# Patient Record
Sex: MALE | Race: WHITE | Employment: OTHER | URBAN - NONMETROPOLITAN AREA
[De-identification: names, ages, dates, MRNs, and addresses within clinical notes are randomized per-mention and may not be internally consistent; named-entity substitution may affect disease eponyms.]

---

## 2019-06-27 ENCOUNTER — OFFICE VISIT (OUTPATIENT)
Dept: FAMILY MEDICINE CLINIC | Age: 69
End: 2019-06-27
Payer: MEDICARE

## 2019-06-27 VITALS
HEIGHT: 74 IN | HEART RATE: 77 BPM | BODY MASS INDEX: 24.95 KG/M2 | DIASTOLIC BLOOD PRESSURE: 70 MMHG | WEIGHT: 194.4 LBS | TEMPERATURE: 98.9 F | OXYGEN SATURATION: 98 % | RESPIRATION RATE: 16 BRPM | SYSTOLIC BLOOD PRESSURE: 122 MMHG

## 2019-06-27 DIAGNOSIS — Z76.89 ENCOUNTER TO ESTABLISH CARE: Primary | ICD-10-CM

## 2019-06-27 DIAGNOSIS — Z12.11 SCREENING FOR COLON CANCER: ICD-10-CM

## 2019-06-27 DIAGNOSIS — Z11.59 NEED FOR HEPATITIS C SCREENING TEST: ICD-10-CM

## 2019-06-27 DIAGNOSIS — Z00.00 MEDICARE ANNUAL WELLNESS VISIT, INITIAL: ICD-10-CM

## 2019-06-27 DIAGNOSIS — F17.210 CIGARETTE NICOTINE DEPENDENCE WITHOUT COMPLICATION: ICD-10-CM

## 2019-06-27 DIAGNOSIS — R30.0 DYSURIA: ICD-10-CM

## 2019-06-27 DIAGNOSIS — R35.1 NOCTURIA: ICD-10-CM

## 2019-06-27 DIAGNOSIS — Z11.4 SCREENING FOR HIV WITHOUT PRESENCE OF RISK FACTORS: ICD-10-CM

## 2019-06-27 DIAGNOSIS — Z13.1 ENCOUNTER FOR SCREENING FOR DIABETES MELLITUS: ICD-10-CM

## 2019-06-27 DIAGNOSIS — Z13.220 SCREENING FOR HYPERLIPIDEMIA: ICD-10-CM

## 2019-06-27 DIAGNOSIS — R82.81 PYURIA: ICD-10-CM

## 2019-06-27 DIAGNOSIS — Z12.5 PROSTATE CANCER SCREENING: ICD-10-CM

## 2019-06-27 LAB
APPEARANCE FLUID: CLEAR
BILIRUBIN, POC: ABNORMAL
BLOOD URINE, POC: ABNORMAL
CLARITY, POC: CLEAR
COLOR, POC: YELLOW
GLUCOSE URINE, POC: ABNORMAL
KETONES, POC: ABNORMAL
LEUKOCYTE EST, POC: ABNORMAL
NITRITE, POC: POSITIVE
PH, POC: 5.5
PROTEIN, POC: 100
SPECIFIC GRAVITY, POC: 1.03
UROBILINOGEN, POC: 0.2

## 2019-06-27 PROCEDURE — 99203 OFFICE O/P NEW LOW 30 MIN: CPT | Performed by: INTERNAL MEDICINE

## 2019-06-27 PROCEDURE — 81002 URINALYSIS NONAUTO W/O SCOPE: CPT | Performed by: INTERNAL MEDICINE

## 2019-06-27 RX ORDER — CIPROFLOXACIN 250 MG/1
250 TABLET, FILM COATED ORAL 2 TIMES DAILY
Qty: 20 TABLET | Refills: 0 | Status: SHIPPED | OUTPATIENT
Start: 2019-06-27 | End: 2019-07-07

## 2019-06-27 SDOH — HEALTH STABILITY: MENTAL HEALTH: HOW OFTEN DO YOU HAVE A DRINK CONTAINING ALCOHOL?: 4 OR MORE TIMES A WEEK

## 2019-06-27 SDOH — HEALTH STABILITY: MENTAL HEALTH: HOW MANY STANDARD DRINKS CONTAINING ALCOHOL DO YOU HAVE ON A TYPICAL DAY?: 1 OR 2

## 2019-06-27 ASSESSMENT — ENCOUNTER SYMPTOMS
WHEEZING: 0
VOICE CHANGE: 0
COLOR CHANGE: 0
EYE DISCHARGE: 0
VOMITING: 0
SINUS PRESSURE: 0
DIARRHEA: 0
RHINORRHEA: 0
SORE THROAT: 0
ABDOMINAL PAIN: 0
COUGH: 0
EYE PAIN: 0
EYE REDNESS: 0
BLOOD IN STOOL: 0
CHEST TIGHTNESS: 0
SHORTNESS OF BREATH: 0

## 2019-06-27 ASSESSMENT — PATIENT HEALTH QUESTIONNAIRE - PHQ9
SUM OF ALL RESPONSES TO PHQ QUESTIONS 1-9: 0
SUM OF ALL RESPONSES TO PHQ9 QUESTIONS 1 & 2: 0
2. FEELING DOWN, DEPRESSED OR HOPELESS: 0
SUM OF ALL RESPONSES TO PHQ QUESTIONS 1-9: 0
1. LITTLE INTEREST OR PLEASURE IN DOING THINGS: 0

## 2019-06-27 NOTE — PROGRESS NOTES
Neurological: Negative for dizziness, tremors, syncope, speech difficulty, weakness, numbness and headaches. Hematological: Negative for adenopathy. Does not bruise/bleed easily. Psychiatric/Behavioral: Negative for confusion, dysphoric mood and sleep disturbance. The patient is not nervous/anxious. All other systems reviewed and are negative. History reviewed. No pertinent past medical history. No current outpatient medications on file. No current facility-administered medications for this visit. No Known Allergies    Past Surgical History:   Procedure Laterality Date    LEG SURGERY Right     femur fracture    SHOULDER SURGERY Right 1965    shoulder fracture, ORIF, football injury       Social History     Tobacco Use    Smoking status: Current Every Day Smoker     Packs/day: 1.00     Years: 47.00     Pack years: 47.00     Types: Cigarettes    Smokeless tobacco: Never Used   Substance Use Topics    Alcohol use: Yes     Alcohol/week: 14.0 standard drinks     Types: 14 Cans of beer per week     Frequency: 4 or more times a week     Drinks per session: 1 or 2     Binge frequency: Daily or almost daily    Drug use: Never       Family History   Problem Relation Age of Onset    Diabetes Mother     Colon Cancer Father 80    No Known Problems Sister     Other Brother         disability with back    No Known Problems Maternal Grandmother     Heart Disease Maternal Grandfather     No Known Problems Paternal Grandmother     No Known Problems Paternal Grandfather     Pancreatic Cancer Daughter     Diabetes type 2  Daughter     Thyroid Disease Daughter     High Cholesterol Daughter        /70   Pulse 77   Temp 98.9 °F (37.2 °C)   Resp 16   Ht 6' 2\" (1.88 m)   Wt 194 lb 6.4 oz (88.2 kg)   SpO2 98%   BMI 24.96 kg/m²     Physical Exam   Constitutional: He is oriented to person, place, and time. He appears well-developed and well-nourished. Non-toxic appearance.  No are 2+ on the right side and 2+ on the left side. Patellar reflexes are 2+ on the right side and 2+ on the left side. Skin: Skin is warm and dry. Capillary refill takes less than 2 seconds. No rash noted. No cyanosis. Nails show no clubbing. Psychiatric: He has a normal mood and affect. His speech is normal and behavior is normal. Judgment and thought content normal. Cognition and memory are normal.   Nursing note and vitals reviewed. POCT UA: specific gravity >1.030, large blood ,trace ketones, 100 protein, nitrite positive, and trace leukocyte esterase    Assessment:    ICD-10-CM    1. Encounter to establish care Z76.89    2. Nocturia R35.1    3. Dysuria R30.0 POCT Urinalysis no Micro     Urinalysis Reflex to Culture   4. Pyuria N39.0 ciprofloxacin (CIPRO) 250 MG tablet   5. Screening for colon cancer Z12.11    6. Need for hepatitis C screening test Z11.59    7. Screening for HIV without presence of risk factors Z11.4    8. Screening for hyperlipidemia Z13.220 Lipid, Fasting   9. Encounter for screening for diabetes mellitus Z13.1    10. Prostate cancer screening Z12.5 Psa screening   11. Cigarette nicotine dependence without complication J97.549    12. Medicare annual wellness visit, initial Z00.00 CBC Auto Differential     Comprehensive Metabolic Panel     CANCELED: Lipid Panel       Plan:  George Adams was seen today for new patient and benign prostatic hypertrophy. Diagnoses and all orders for this visit:    Encounter to establish care    Nocturia    Dysuria  -     POCT Urinalysis no Micro  -     Urinalysis Reflex to Culture; Future    Pyuria  -     ciprofloxacin (CIPRO) 250 MG tablet; Take 1 tablet by mouth 2 times daily for 10 days Take with food    Screening for colon cancer    Need for hepatitis C screening test    Screening for HIV without presence of risk factors    Screening for hyperlipidemia  -     Lipid, Fasting;  Future    Encounter for screening for diabetes mellitus    Prostate cancer a mammogram, which is an X-ray of your breasts. A mammogram can spot breast cancer before it can be felt and when it is easiest to treat. · Thyroid disease. Talk to your doctor about whether to have your thyroid checked as part of a regular physical exam. Women have an increased chance of a thyroid problem. For men  · Prostate exam. Talk to your doctor about whether you should have a blood test (called a PSA test) for prostate cancer. Experts recommend that you discuss the benefits and risks of the test with your doctor before you decide whether to have this test. Some experts say that men ages 79 and older no longer need testing. · Abdominal aortic aneurysm. Ask your doctor whether you should have a test to check for an aneurysm. You may need a test if you ever smoked or if your parent, brother, sister, or child has had an aneurysm. When should you call for help? Watch closely for changes in your health, and be sure to contact your doctor if you have any problems or symptoms that concern you. Where can you learn more? Go to https://Xola.Vibes. org and sign in to your S[employer]ing account. Enter V812 in the [facility] box to learn more about \"Well Visit, Over 65: Care Instructions. \"     If you do not have an account, please click on the \"Sign Up Now\" link. Current as of: December 13, 2018  Content Version: 12.0  © 3490-4398 Healthwise, Incorporated. Care instructions adapted under license by [facility] ([facility]). If you have questions about a medical condition or this instruction, always ask your healthcare professional. [place] [address] any warranty or liability for your use of this information. Patient Education        Colon Cancer Screening: Care Instructions  Your Care Instructions    Colorectal cancer occurs in the colon or rectum. That's the lower part of your digestive system. It is the second-leading cause of cancer deaths in the [place].  It often starts with small growths called polyps in the colon or rectum. Polyps are usually found with screening tests. Depending on the type of test, any polyps found may be removed during the tests. Colorectal cancer usually does not cause symptoms at first. But regular tests can help find it early, before it spreads and becomes harder to treat. Your risk for colorectal cancer gets higher as you get older. Some experts say that adults should start regular screening at age 48 and stop at age 76. Others say to start before age 48 or continue after age 76. Talk with your doctor about your risk and when to start and stop screening. You may have one of several tests. Follow-up care is a key part of your treatment and safety. Be sure to make and go to all appointments, and call your doctor if you are having problems. It's also a good idea to know your test results and keep a list of the medicines you take. What are the main screening tests for colon cancer? · Stool tests. These include the fecal immunochemical test (FIT) and the fecal occult blood test (FOBT). These tests check stool samples for signs of cancer. If your test is positive, you will need to have a colonoscopy. · Sigmoidoscopy. This test lets your doctor look at the lining of your rectum and the lowest part of your colon. Your doctor uses a lighted tube called a sigmoidoscope. This test can't find cancers or polyps in the upper part of your colon. In some cases, polyps that are found can be removed. But if your doctor finds polyps, you will need to have a colonoscopy to check the upper part of your colon. · Colonoscopy. This test lets your doctor look at the lining of your rectum and your entire colon. The doctor uses a thin, flexible tool called a colonoscope. It can also be used to remove polyps or get a tissue sample (biopsy). What tests do you need? The following guidelines are for adults who are not at high risk for colorectal cancer.  You may have at least one of these tests as directed by your doctor. · Fecal immunochemical test (FIT) or guaiac fecal occult blood test (gFOBT) every year  · Sigmoidoscopy every 5 years  · Colonoscopy every 10 years  If you are over age 76, you can work with your doctor to decide if screening is a good option. Talk with your doctor about when you need to be tested. And discuss which tests are right for you. Your doctor may recommend earlier or more frequent testing if you:  · Have had colorectal cancer before. · Have had colon polyps. · Have symptoms of colorectal cancer. These include blood in your stool and changes in your bowel habits. · Have a parent, brother or sister, or child with colon polyps or colorectal cancer. · Have a bowel disease. This includes ulcerative colitis and Crohn's disease. · Have a rare polyp syndrome that runs in families, such as familial adenomatous polyposis (FAP). · Have had radiation treatments to the belly or pelvis. When should you call for help? Watch closely for changes in your health, and be sure to contact your doctor if:    · You have any changes in your bowel habits.     · You have any problems. Where can you learn more? Go to https://Brentwood Investments.Vicus Therapeutics. org and sign in to your Emergent Views account. Enter [phone] in the [facility] box to learn more about \"Colon Cancer Screening: Care Instructions. \"     If you do not have an account, please click on the \"Sign Up Now\" link. Current as of: December 19, 2018  Content Version: 12.0  © 6721-1768 Genesant. Care instructions adapted under license by [facility] ([facility]). If you have questions about a medical condition or this instruction, always ask your healthcare professional. [place] [address] any warranty or liability for your use of this information.          Patient Education        Benign Prostatic Hyperplasia: Care Instructions  Your Care Instructions    Benign prostatic hyperplasia, or you urinate. This turns your urine orange. You may stop taking it when your symptoms get better. But be sure to take all of your antibiotics, which treat the infection. · Drink extra water for the next day or two. This will help make the urine less concentrated and help wash out the bacteria causing the infection. (If you have kidney, heart, or liver disease and have to limit your fluids, talk with your doctor before you increase your fluid intake.)  · Avoid drinks that are carbonated or have caffeine. They can irritate the bladder. · Urinate often. Try to empty your bladder each time. · To relieve pain, take a hot bath or lay a heating pad (set on low) over your lower belly or genital area. Never go to sleep with a heating pad in place. To help prevent UTIs  · Drink plenty of fluids, enough so that your urine is light yellow or clear like water. If you have kidney, heart, or liver disease and have to limit fluids, talk with your doctor before you increase the amount of fluids you drink. · Urinate when you have the urge. Do not hold your urine for a long time. Urinate before you go to sleep. · Keep your penis clean. Catheter care  If you have a drainage tube (catheter) in place, the following steps will help you care for it. · Always wash your hands before and after touching your catheter. · Check the area around the urethra for inflammation or signs of infection. Signs of infection include irritated, swollen, red, or tender skin, or pus around the catheter. · Clean the area around the catheter with soap and water two times a day. Dry with a clean towel afterward. · Do not apply powder or lotion to the skin around the catheter. To empty the urine collection bag  · Wash your hands with soap and water. · Without touching the drain spout, remove the spout from its sleeve at the bottom of the collection bag. Open the valve on the spout.   · Let the urine flow out of the bag and into the toilet or a container. Do not let the tubing or drain spout touch anything. · After you empty the bag, clean the end of the drain spout with tissue and water. Close the valve and put the drain spout back into its sleeve at the bottom of the collection bag. · Wash your hands with soap and water. When should you call for help? Call your doctor now or seek immediate medical care if:    · Symptoms such as a fever, chills, nausea, or vomiting get worse or happen for the first time.     · You have new pain in your back just below your rib cage. This is called flank pain.     · There is new blood or pus in your urine.     · You are not able to take or keep down your antibiotics.    Watch closely for changes in your health, and be sure to contact your doctor if:    · You are not getting better after taking an antibiotic for 2 days.     · Your symptoms go away but then come back. Where can you learn more? Go to https://CapRally.Cloud Nine Productions. org and sign in to your [employer] account. Enter H125 in the [employer] box to learn more about \"Urinary Tract Infections in Men: Care Instructions. \"     If you do not have an account, please click on the \"Sign Up Now\" link. Current as of: December 19, 2018  Content Version: 12.0  © 5168-1935 Healthwise, Incorporated. Care instructions adapted under license by [facility] ([facility]). If you have questions about a medical condition or this instruction, always ask your healthcare professional. [place] [address] any warranty or liability for your use of this information. Patient voices understanding and agrees to plans along with risks and benefits of plan. Counseling:  Gracia Day's case, medications and options werediscussed in detail. Patient was instructed to call the office if he   questions regarding him treatment. Should him conditions worsen, he should return to office to be reassessed byDr. Gracia Ocampo.  he  Should to go the

## 2019-06-27 NOTE — PATIENT INSTRUCTIONS
Patient Education        Well Visit, Over 72: Care Instructions  Your Care Instructions    Physical exams can help you stay healthy. Your doctor has checked your overall health and may have suggested ways to take good care of yourself. He or she also may have recommended tests. At home, you can help prevent illness with healthy eating, regular exercise, and other steps. Follow-up care is a key part of your treatment and safety. Be sure to make and go to all appointments, and call your doctor if you are having problems. It's also a good idea to know your test results and keep a list of the medicines you take. How can you care for yourself at home? · Reach and stay at a healthy weight. This will lower your risk for many problems, such as obesity, diabetes, heart disease, and high blood pressure. · Get at least 30 minutes of exercise on most days of the week. Walking is a good choice. You also may want to do other activities, such as running, swimming, cycling, or playing tennis or team sports. · Do not smoke. Smoking can make health problems worse. If you need help quitting, talk to your doctor about stop-smoking programs and medicines. These can increase your chances of quitting for good. · Protect your skin from too much sun. When you're outdoors from 10 a.m. to 4 p.m., stay in the shade or cover up with clothing and a hat with a wide brim. Wear sunglasses that block UV rays. Even when it's cloudy, put broad-spectrum sunscreen (SPF 30 or higher) on any exposed skin. · See a dentist one or two times a year for checkups and to have your teeth cleaned. · Wear a seat belt in the car. · Limit alcohol to 2 drinks a day for men and 1 drink a day for women. Too much alcohol can cause health problems. Follow your doctor's advice about when to have certain tests. These tests can spot problems early. For men and women  · Cholesterol.  Your doctor will tell you how often to have this done based on your overall benefits and risks of the test with your doctor before you decide whether to have this test. Some experts say that men ages 79 and older no longer need testing. · Abdominal aortic aneurysm. Ask your doctor whether you should have a test to check for an aneurysm. You may need a test if you ever smoked or if your parent, brother, sister, or child has had an aneurysm. When should you call for help? Watch closely for changes in your health, and be sure to contact your doctor if you have any problems or symptoms that concern you. Where can you learn more? Go to https://chpepiceweb.Be Sport. org and sign in to your [employer] account. Enter G731 in the [employer] box to learn more about \"Well Visit, Over 65: Care Instructions. \"     If you do not have an account, please click on the \"Sign Up Now\" link. Current as of: December 13, 2018  Content Version: 12.0  © 9057-7173 Covagen. Care instructions adapted under license by [facility] ([facility]). If you have questions about a medical condition or this instruction, always ask your healthcare professional. [place] [address] any warranty or liability for your use of this information. Patient Education        Colon Cancer Screening: Care Instructions  Your Care Instructions    Colorectal cancer occurs in the colon or rectum. That's the lower part of your digestive system. It is the second-leading cause of cancer deaths in the [place]. It often starts with small growths called polyps in the colon or rectum. Polyps are usually found with screening tests. Depending on the type of test, any polyps found may be removed during the tests. Colorectal cancer usually does not cause symptoms at first. But regular tests can help find it early, before it spreads and becomes harder to treat. Your risk for colorectal cancer gets higher as you get older.  Some experts say that adults should start regular screening at age 48 and stop

## 2019-07-02 DIAGNOSIS — Z00.00 MEDICARE ANNUAL WELLNESS VISIT, INITIAL: ICD-10-CM

## 2019-07-02 DIAGNOSIS — Z12.5 PROSTATE CANCER SCREENING: ICD-10-CM

## 2019-07-02 DIAGNOSIS — Z13.220 SCREENING FOR HYPERLIPIDEMIA: ICD-10-CM

## 2019-07-02 DIAGNOSIS — R30.0 DYSURIA: ICD-10-CM

## 2019-07-02 LAB
ALBUMIN SERPL-MCNC: 4.3 G/DL (ref 3.5–5.2)
ALP BLD-CCNC: 73 U/L (ref 40–130)
ALT SERPL-CCNC: 12 U/L (ref 5–41)
ANION GAP SERPL CALCULATED.3IONS-SCNC: 14 MMOL/L (ref 7–19)
AST SERPL-CCNC: 16 U/L (ref 5–40)
BASOPHILS ABSOLUTE: 0.1 K/UL (ref 0–0.2)
BASOPHILS RELATIVE PERCENT: 1.5 % (ref 0–1)
BILIRUB SERPL-MCNC: 1.1 MG/DL (ref 0.2–1.2)
BILIRUBIN URINE: NEGATIVE
BLOOD, URINE: NEGATIVE
BUN BLDV-MCNC: 15 MG/DL (ref 8–23)
CALCIUM SERPL-MCNC: 9.2 MG/DL (ref 8.8–10.2)
CHLORIDE BLD-SCNC: 104 MMOL/L (ref 98–111)
CHOLESTEROL, FASTING: 153 MG/DL (ref 160–199)
CLARITY: CLEAR
CO2: 22 MMOL/L (ref 22–29)
COLOR: YELLOW
CREAT SERPL-MCNC: 0.8 MG/DL (ref 0.5–1.2)
EOSINOPHILS ABSOLUTE: 0.1 K/UL (ref 0–0.6)
EOSINOPHILS RELATIVE PERCENT: 2 % (ref 0–5)
GFR NON-AFRICAN AMERICAN: >60
GLUCOSE BLD-MCNC: 100 MG/DL (ref 74–109)
GLUCOSE URINE: NEGATIVE MG/DL
HCT VFR BLD CALC: 45.2 % (ref 42–52)
HDLC SERPL-MCNC: 61 MG/DL (ref 55–121)
HEMOGLOBIN: 15 G/DL (ref 14–18)
KETONES, URINE: NEGATIVE MG/DL
LDL CHOLESTEROL CALCULATED: 81 MG/DL
LEUKOCYTE ESTERASE, URINE: NEGATIVE
LYMPHOCYTES ABSOLUTE: 1.3 K/UL (ref 1.1–4.5)
LYMPHOCYTES RELATIVE PERCENT: 22.1 % (ref 20–40)
MCH RBC QN AUTO: 31.4 PG (ref 27–31)
MCHC RBC AUTO-ENTMCNC: 33.2 G/DL (ref 33–37)
MCV RBC AUTO: 94.6 FL (ref 80–94)
MONOCYTES ABSOLUTE: 0.7 K/UL (ref 0–0.9)
MONOCYTES RELATIVE PERCENT: 11.4 % (ref 0–10)
NEUTROPHILS ABSOLUTE: 3.7 K/UL (ref 1.5–7.5)
NEUTROPHILS RELATIVE PERCENT: 62.7 % (ref 50–65)
NITRITE, URINE: NEGATIVE
PDW BLD-RTO: 13.6 % (ref 11.5–14.5)
PH UA: 6.5 (ref 5–8)
PLATELET # BLD: 182 K/UL (ref 130–400)
PMV BLD AUTO: 11.2 FL (ref 9.4–12.4)
POTASSIUM SERPL-SCNC: 4.3 MMOL/L (ref 3.5–5)
PROSTATE SPECIFIC ANTIGEN: 0.35 NG/ML (ref 0–4)
PROTEIN UA: ABNORMAL MG/DL
RBC # BLD: 4.78 M/UL (ref 4.7–6.1)
SODIUM BLD-SCNC: 140 MMOL/L (ref 136–145)
SPECIFIC GRAVITY UA: 1.02 (ref 1–1.03)
TOTAL PROTEIN: 7.4 G/DL (ref 6.6–8.7)
TRIGLYCERIDE, FASTING: 53 MG/DL (ref 0–149)
URINE REFLEX TO CULTURE: ABNORMAL
UROBILINOGEN, URINE: 1 E.U./DL
WBC # BLD: 5.9 K/UL (ref 4.8–10.8)

## 2019-07-26 ENCOUNTER — TELEPHONE (OUTPATIENT)
Dept: FAMILY MEDICINE CLINIC | Age: 69
End: 2019-07-26

## 2019-07-26 DIAGNOSIS — Z12.11 COLON CANCER SCREENING: Primary | ICD-10-CM

## 2019-10-03 ENCOUNTER — OFFICE VISIT (OUTPATIENT)
Dept: FAMILY MEDICINE CLINIC | Age: 69
End: 2019-10-03
Payer: MEDICARE

## 2019-10-03 VITALS
WEIGHT: 198 LBS | TEMPERATURE: 98.9 F | SYSTOLIC BLOOD PRESSURE: 130 MMHG | HEART RATE: 66 BPM | OXYGEN SATURATION: 97 % | DIASTOLIC BLOOD PRESSURE: 72 MMHG | HEIGHT: 75 IN | BODY MASS INDEX: 24.62 KG/M2

## 2019-10-03 DIAGNOSIS — Z87.891 PERSONAL HISTORY OF TOBACCO USE, PRESENTING HAZARDS TO HEALTH: ICD-10-CM

## 2019-10-03 DIAGNOSIS — R19.5 POSITIVE COLORECTAL CANCER SCREENING USING COLOGUARD TEST: ICD-10-CM

## 2019-10-03 DIAGNOSIS — H61.23 BILATERAL IMPACTED CERUMEN: ICD-10-CM

## 2019-10-03 DIAGNOSIS — Z00.00 ROUTINE GENERAL MEDICAL EXAMINATION AT A HEALTH CARE FACILITY: ICD-10-CM

## 2019-10-03 DIAGNOSIS — F17.210 CIGARETTE SMOKER: ICD-10-CM

## 2019-10-03 DIAGNOSIS — M79.672 LEFT FOOT PAIN: ICD-10-CM

## 2019-10-03 DIAGNOSIS — Z87.891 PERSONAL HISTORY OF TOBACCO USE: ICD-10-CM

## 2019-10-03 DIAGNOSIS — Z00.00 MEDICARE ANNUAL WELLNESS VISIT, INITIAL: Primary | ICD-10-CM

## 2019-10-03 PROBLEM — R35.1 NOCTURIA: Status: RESOLVED | Noted: 2019-06-27 | Resolved: 2019-10-03

## 2019-10-03 PROCEDURE — G0438 PPPS, INITIAL VISIT: HCPCS | Performed by: INTERNAL MEDICINE

## 2019-10-03 PROCEDURE — G0296 VISIT TO DETERM LDCT ELIG: HCPCS | Performed by: INTERNAL MEDICINE

## 2019-10-03 RX ORDER — VARENICLINE TARTRATE 1 MG/1
1 TABLET, FILM COATED ORAL 2 TIMES DAILY
Qty: 60 TABLET | Refills: 3 | Status: SHIPPED | OUTPATIENT
Start: 2019-10-03 | End: 2019-12-06

## 2019-10-03 RX ORDER — VARENICLINE TARTRATE 25 MG
KIT ORAL
Qty: 1 BOX | Refills: 0 | Status: SHIPPED | OUTPATIENT
Start: 2019-10-03 | End: 2019-12-06

## 2019-10-03 ASSESSMENT — LIFESTYLE VARIABLES
HOW OFTEN DO YOU HAVE SIX OR MORE DRINKS ON ONE OCCASION: 0
HAS A RELATIVE, FRIEND, DOCTOR, OR ANOTHER HEALTH PROFESSIONAL EXPRESSED CONCERN ABOUT YOUR DRINKING OR SUGGESTED YOU CUT DOWN: 0
HOW OFTEN DURING THE LAST YEAR HAVE YOU FAILED TO DO WHAT WAS NORMALLY EXPECTED FROM YOU BECAUSE OF DRINKING: 0
HOW OFTEN DURING THE LAST YEAR HAVE YOU FOUND THAT YOU WERE NOT ABLE TO STOP DRINKING ONCE YOU HAD STARTED: 0
HOW OFTEN DURING THE LAST YEAR HAVE YOU NEEDED AN ALCOHOLIC DRINK FIRST THING IN THE MORNING TO GET YOURSELF GOING AFTER A NIGHT OF HEAVY DRINKING: 0
HOW OFTEN DURING THE LAST YEAR HAVE YOU HAD A FEELING OF GUILT OR REMORSE AFTER DRINKING: 0
HOW OFTEN DURING THE LAST YEAR HAVE YOU BEEN UNABLE TO REMEMBER WHAT HAPPENED THE NIGHT BEFORE BECAUSE YOU HAD BEEN DRINKING: 0
HOW OFTEN DO YOU HAVE A DRINK CONTAINING ALCOHOL: 4
HAVE YOU OR SOMEONE ELSE BEEN INJURED AS A RESULT OF YOUR DRINKING: 0

## 2019-10-03 ASSESSMENT — PATIENT HEALTH QUESTIONNAIRE - PHQ9
SUM OF ALL RESPONSES TO PHQ QUESTIONS 1-9: 0
SUM OF ALL RESPONSES TO PHQ QUESTIONS 1-9: 0

## 2019-10-08 ENCOUNTER — OFFICE VISIT (OUTPATIENT)
Dept: GASTROENTEROLOGY | Age: 69
End: 2019-10-08
Payer: MEDICARE

## 2019-10-08 VITALS
DIASTOLIC BLOOD PRESSURE: 70 MMHG | HEART RATE: 70 BPM | OXYGEN SATURATION: 96 % | SYSTOLIC BLOOD PRESSURE: 135 MMHG | HEIGHT: 75 IN | BODY MASS INDEX: 24.37 KG/M2 | WEIGHT: 196 LBS

## 2019-10-08 DIAGNOSIS — Z83.71 FAMILY HISTORY OF COLONIC POLYPS: ICD-10-CM

## 2019-10-08 DIAGNOSIS — R19.5 POSITIVE COLORECTAL CANCER SCREENING USING COLOGUARD TEST: Primary | ICD-10-CM

## 2019-10-08 PROBLEM — Z83.719 FAMILY HISTORY OF COLONIC POLYPS: Status: ACTIVE | Noted: 2019-10-08

## 2019-10-08 PROCEDURE — 99203 OFFICE O/P NEW LOW 30 MIN: CPT | Performed by: NURSE PRACTITIONER

## 2019-10-08 ASSESSMENT — ENCOUNTER SYMPTOMS
RECTAL PAIN: 0
SHORTNESS OF BREATH: 0
VOMITING: 0
NAUSEA: 0
ANAL BLEEDING: 0
ABDOMINAL PAIN: 0
ABDOMINAL DISTENTION: 0
TROUBLE SWALLOWING: 0
COUGH: 0
VOICE CHANGE: 0
CONSTIPATION: 0
BACK PAIN: 0
SORE THROAT: 0
BLOOD IN STOOL: 0
DIARRHEA: 0

## 2019-10-10 ENCOUNTER — HOSPITAL ENCOUNTER (OUTPATIENT)
Dept: CT IMAGING | Age: 69
Discharge: HOME | End: 2019-10-10
Payer: MEDICARE

## 2019-10-10 DIAGNOSIS — Z87.891 PERSONAL HISTORY OF TOBACCO USE: ICD-10-CM

## 2019-10-10 PROCEDURE — G0297 LDCT FOR LUNG CA SCREEN: HCPCS

## 2019-10-13 PROBLEM — J43.2 CENTRILOBULAR EMPHYSEMA (HCC): Status: ACTIVE | Noted: 2019-10-13

## 2019-10-14 DIAGNOSIS — J44.9 CHRONIC OBSTRUCTIVE PULMONARY DISEASE, UNSPECIFIED COPD TYPE (HCC): Primary | ICD-10-CM

## 2019-10-16 ENCOUNTER — OUTSIDE FACILITY SERVICE (OUTPATIENT)
Dept: PULMONOLOGY | Facility: CLINIC | Age: 69
End: 2019-10-16

## 2019-10-16 ENCOUNTER — HOSPITAL ENCOUNTER (OUTPATIENT)
Dept: PULMONOLOGY | Age: 69
Discharge: HOME | End: 2019-10-16
Payer: MEDICARE

## 2019-10-16 VITALS — HEIGHT: 75 IN | WEIGHT: 194 LBS | BODY MASS INDEX: 24.12 KG/M2

## 2019-10-16 DIAGNOSIS — J44.9 CHRONIC OBSTRUCTIVE PULMONARY DISEASE, UNSPECIFIED COPD TYPE (HCC): ICD-10-CM

## 2019-10-16 PROCEDURE — 94060 EVALUATION OF WHEEZING: CPT | Performed by: INTERNAL MEDICINE

## 2019-10-16 PROCEDURE — 94729 DIFFUSING CAPACITY: CPT

## 2019-10-16 PROCEDURE — 94060 EVALUATION OF WHEEZING: CPT

## 2019-10-16 PROCEDURE — 6360000002 HC RX W HCPCS: Performed by: INTERNAL MEDICINE

## 2019-10-16 PROCEDURE — 94729 DIFFUSING CAPACITY: CPT | Performed by: INTERNAL MEDICINE

## 2019-10-16 PROCEDURE — 94727 GAS DIL/WSHOT DETER LNG VOL: CPT

## 2019-10-16 PROCEDURE — 94727 GAS DIL/WSHOT DETER LNG VOL: CPT | Performed by: INTERNAL MEDICINE

## 2019-10-16 RX ORDER — ALBUTEROL SULFATE 2.5 MG/3ML
2.5 SOLUTION RESPIRATORY (INHALATION) EVERY 6 HOURS PRN
Status: DISCONTINUED | OUTPATIENT
Start: 2019-10-16 | End: 2019-10-18 | Stop reason: HOSPADM

## 2019-10-16 RX ADMIN — ALBUTEROL SULFATE 2.5 MG: 2.5 SOLUTION RESPIRATORY (INHALATION) at 08:55

## 2019-10-23 ENCOUNTER — TELEPHONE (OUTPATIENT)
Dept: FAMILY MEDICINE CLINIC | Age: 69
End: 2019-10-23

## 2019-11-07 ENCOUNTER — HOSPITAL ENCOUNTER (OUTPATIENT)
Age: 69
Setting detail: OUTPATIENT SURGERY
Discharge: HOME | End: 2019-11-07
Attending: INTERNAL MEDICINE | Admitting: INTERNAL MEDICINE
Payer: MEDICARE

## 2019-11-07 ENCOUNTER — APPOINTMENT (OUTPATIENT)
Dept: OPERATING ROOM | Age: 69
End: 2019-11-07

## 2019-11-07 ENCOUNTER — ANESTHESIA (OUTPATIENT)
Dept: OPERATING ROOM | Age: 69
End: 2019-11-07

## 2019-11-07 ENCOUNTER — HOSPITAL ENCOUNTER (OUTPATIENT)
Age: 69
Setting detail: SPECIMEN
Discharge: HOME | End: 2019-11-07
Payer: MEDICARE

## 2019-11-07 ENCOUNTER — ANESTHESIA EVENT (OUTPATIENT)
Dept: OPERATING ROOM | Age: 69
End: 2019-11-07

## 2019-11-07 VITALS
RESPIRATION RATE: 16 BRPM | BODY MASS INDEX: 24.12 KG/M2 | TEMPERATURE: 98 F | HEART RATE: 63 BPM | OXYGEN SATURATION: 94 % | HEIGHT: 75 IN | DIASTOLIC BLOOD PRESSURE: 77 MMHG | WEIGHT: 194 LBS | SYSTOLIC BLOOD PRESSURE: 140 MMHG

## 2019-11-07 VITALS — DIASTOLIC BLOOD PRESSURE: 70 MMHG | OXYGEN SATURATION: 96 % | SYSTOLIC BLOOD PRESSURE: 111 MMHG

## 2019-11-07 PROCEDURE — G8918 PT W/O PREOP ORDER IV AB PRO: HCPCS

## 2019-11-07 PROCEDURE — 45380 COLONOSCOPY AND BIOPSY: CPT | Performed by: INTERNAL MEDICINE

## 2019-11-07 PROCEDURE — 45385 COLONOSCOPY W/LESION REMOVAL: CPT | Performed by: INTERNAL MEDICINE

## 2019-11-07 PROCEDURE — 88305 TISSUE EXAM BY PATHOLOGIST: CPT

## 2019-11-07 PROCEDURE — G8907 PT DOC NO EVENTS ON DISCHARG: HCPCS

## 2019-11-07 PROCEDURE — 45380 COLONOSCOPY AND BIOPSY: CPT

## 2019-11-07 PROCEDURE — 45385 COLONOSCOPY W/LESION REMOVAL: CPT

## 2019-11-07 RX ORDER — SODIUM CHLORIDE 9 MG/ML
INJECTION, SOLUTION INTRAVENOUS CONTINUOUS
Status: DISCONTINUED | OUTPATIENT
Start: 2019-11-07 | End: 2019-11-07 | Stop reason: HOSPADM

## 2019-11-07 RX ORDER — LIDOCAINE HYDROCHLORIDE 10 MG/ML
INJECTION, SOLUTION INFILTRATION; PERINEURAL PRN
Status: DISCONTINUED | OUTPATIENT
Start: 2019-11-07 | End: 2019-11-07 | Stop reason: SDUPTHER

## 2019-11-07 RX ORDER — PROPOFOL 10 MG/ML
INJECTION, EMULSION INTRAVENOUS PRN
Status: DISCONTINUED | OUTPATIENT
Start: 2019-11-07 | End: 2019-11-07 | Stop reason: SDUPTHER

## 2019-11-07 RX ADMIN — LIDOCAINE HYDROCHLORIDE 20 MG: 10 INJECTION, SOLUTION INFILTRATION; PERINEURAL at 09:06

## 2019-11-07 RX ADMIN — PROPOFOL 250 MG: 10 INJECTION, EMULSION INTRAVENOUS at 09:06

## 2019-11-07 RX ADMIN — SODIUM CHLORIDE: 9 INJECTION, SOLUTION INTRAVENOUS at 08:37

## 2019-11-07 ASSESSMENT — PAIN SCALES - GENERAL
PAINLEVEL_OUTOF10: 0
PAINLEVEL_OUTOF10: 0

## 2019-11-07 ASSESSMENT — PAIN - FUNCTIONAL ASSESSMENT: PAIN_FUNCTIONAL_ASSESSMENT: 0-10

## 2019-11-07 ASSESSMENT — LIFESTYLE VARIABLES: SMOKING_STATUS: 1

## 2019-12-06 ENCOUNTER — HOSPITAL ENCOUNTER (OUTPATIENT)
Dept: GENERAL RADIOLOGY | Age: 69
Discharge: HOME | End: 2019-12-06
Payer: MEDICARE

## 2019-12-06 ENCOUNTER — OFFICE VISIT (OUTPATIENT)
Dept: FAMILY MEDICINE CLINIC | Age: 69
End: 2019-12-06
Payer: MEDICARE

## 2019-12-06 VITALS
WEIGHT: 197.4 LBS | DIASTOLIC BLOOD PRESSURE: 68 MMHG | HEIGHT: 75 IN | TEMPERATURE: 98.6 F | HEART RATE: 65 BPM | OXYGEN SATURATION: 97 % | BODY MASS INDEX: 24.54 KG/M2 | SYSTOLIC BLOOD PRESSURE: 126 MMHG

## 2019-12-06 DIAGNOSIS — M47.26 OSTEOARTHRITIS OF SPINE WITH RADICULOPATHY, LUMBAR REGION: ICD-10-CM

## 2019-12-06 DIAGNOSIS — M54.42 CHRONIC BILATERAL LOW BACK PAIN WITH LEFT-SIDED SCIATICA: ICD-10-CM

## 2019-12-06 DIAGNOSIS — G89.29 CHRONIC BILATERAL LOW BACK PAIN WITH LEFT-SIDED SCIATICA: ICD-10-CM

## 2019-12-06 DIAGNOSIS — Z87.891 PERSONAL HISTORY OF TOBACCO USE, PRESENTING HAZARDS TO HEALTH: Primary | ICD-10-CM

## 2019-12-06 DIAGNOSIS — J44.9 CHRONIC OBSTRUCTIVE PULMONARY DISEASE, UNSPECIFIED COPD TYPE (HCC): ICD-10-CM

## 2019-12-06 DIAGNOSIS — F41.9 ANXIETY: ICD-10-CM

## 2019-12-06 PROCEDURE — 99214 OFFICE O/P EST MOD 30 MIN: CPT | Performed by: INTERNAL MEDICINE

## 2019-12-06 PROCEDURE — 72100 X-RAY EXAM L-S SPINE 2/3 VWS: CPT

## 2019-12-06 RX ORDER — BUPROPION HYDROCHLORIDE 150 MG/1
TABLET, EXTENDED RELEASE ORAL
Qty: 60 TABLET | Refills: 3 | Status: SHIPPED | OUTPATIENT
Start: 2019-12-06 | End: 2020-01-22 | Stop reason: DRUGHIGH

## 2019-12-06 RX ORDER — NICOTINE 21 MG/24HR
1 PATCH, TRANSDERMAL 24 HOURS TRANSDERMAL DAILY
Qty: 30 PATCH | Refills: 2 | Status: SHIPPED | OUTPATIENT
Start: 2019-12-06 | End: 2020-01-22 | Stop reason: SDUPTHER

## 2019-12-06 ASSESSMENT — ENCOUNTER SYMPTOMS
EYE DISCHARGE: 0
BLOOD IN STOOL: 0
SORE THROAT: 0
VOICE CHANGE: 0
WHEEZING: 0
EYE REDNESS: 0
VOMITING: 0
SINUS PRESSURE: 0
EYE PAIN: 0
SHORTNESS OF BREATH: 0
COLOR CHANGE: 0
CHEST TIGHTNESS: 0
BACK PAIN: 1
ABDOMINAL PAIN: 0
DIARRHEA: 0
COUGH: 0
RHINORRHEA: 0

## 2019-12-29 PROBLEM — Z12.11 SCREENING FOR COLON CANCER: Status: ACTIVE | Noted: 2019-06-27

## 2020-01-13 ENCOUNTER — TELEPHONE (OUTPATIENT)
Dept: FAMILY MEDICINE CLINIC | Age: 70
End: 2020-01-13

## 2020-01-22 ENCOUNTER — OFFICE VISIT (OUTPATIENT)
Dept: FAMILY MEDICINE CLINIC | Age: 70
End: 2020-01-22
Payer: MEDICARE

## 2020-01-22 VITALS
DIASTOLIC BLOOD PRESSURE: 70 MMHG | OXYGEN SATURATION: 96 % | BODY MASS INDEX: 24.59 KG/M2 | WEIGHT: 197.8 LBS | TEMPERATURE: 98 F | HEIGHT: 75 IN | SYSTOLIC BLOOD PRESSURE: 138 MMHG | HEART RATE: 75 BPM

## 2020-01-22 PROCEDURE — 99213 OFFICE O/P EST LOW 20 MIN: CPT | Performed by: INTERNAL MEDICINE

## 2020-01-22 RX ORDER — DOCUSATE SODIUM 100 MG/1
100 CAPSULE, LIQUID FILLED ORAL 2 TIMES DAILY PRN
Qty: 60 CAPSULE | Refills: 3 | COMMUNITY
Start: 2020-01-22 | End: 2020-12-02

## 2020-01-22 RX ORDER — NICOTINE 21 MG/24HR
1 PATCH, TRANSDERMAL 24 HOURS TRANSDERMAL DAILY
Qty: 30 PATCH | Refills: 2 | Status: SHIPPED | OUTPATIENT
Start: 2020-01-22 | End: 2020-12-02

## 2020-01-22 RX ORDER — BUPROPION HYDROCHLORIDE 300 MG/1
300 TABLET ORAL EVERY MORNING
Qty: 30 TABLET | Refills: 5 | Status: SHIPPED | OUTPATIENT
Start: 2020-01-22 | End: 2020-12-02

## 2020-01-22 ASSESSMENT — ENCOUNTER SYMPTOMS
DIARRHEA: 0
SORE THROAT: 0
SHORTNESS OF BREATH: 0
VOICE CHANGE: 0
EYE REDNESS: 0
RHINORRHEA: 0
EYE DISCHARGE: 0
BLOOD IN STOOL: 0
CHEST TIGHTNESS: 0
VOMITING: 0
COLOR CHANGE: 0
EYE PAIN: 0
SINUS PRESSURE: 0
WHEEZING: 0
ABDOMINAL PAIN: 0

## 2020-01-22 ASSESSMENT — PATIENT HEALTH QUESTIONNAIRE - PHQ9
1. LITTLE INTEREST OR PLEASURE IN DOING THINGS: 0
SUM OF ALL RESPONSES TO PHQ QUESTIONS 1-9: 0
SUM OF ALL RESPONSES TO PHQ QUESTIONS 1-9: 0
SUM OF ALL RESPONSES TO PHQ9 QUESTIONS 1 & 2: 0
2. FEELING DOWN, DEPRESSED OR HOPELESS: 0

## 2020-01-22 NOTE — PATIENT INSTRUCTIONS
in your stools.    Watch closely for changes in your health, and be sure to contact your doctor if:    · Your constipation is getting worse.     · You do not get better as expected. Where can you learn more? Go to https://nahid.Mobly. org and sign in to your Closely account. Enter [phone] in the [facility] box to learn more about \"Constipation: Care Instructions. \"     If you do not have an account, please click on the \"Sign Up Now\" link. Current as of: June 26, 2019  Content Version: 12.3  © 5929-9061 [employer]Media Comunicazione. Care instructions adapted under license by [facility] ([facility]). If you have questions about a medical condition or this instruction, always ask your healthcare professional. [address] any warranty or liability for your use of this information. Patient Education        Stopping Smoking: Care Instructions  Your Care Instructions  Cigarette smokers crave the nicotine in cigarettes. Giving it up is much harder than simply changing a habit. Your body has to stop craving the nicotine. It is hard to quit, but you can do it. There are many tools that people use to quit smoking. You may find that combining tools works best for you. There are several steps to quitting. First you get ready to quit. Then you get support to help you. After that, you learn new skills and behaviors to become a nonsmoker. For many people, a necessary step is getting and using medicine. Your doctor will help you set up the plan that best meets your needs. You may want to attend a smoking cessation program to help you quit smoking. When you choose a program, look for one that has proven success. Ask your doctor for ideas. You will greatly increase your chances of success if you take medicine as well as get counseling or join a cessation program.  Some of the changes you feel when you first quit tobacco are uncomfortable.  Your body will miss the nicotine at first, and you may feel short-tempered and grumpy. You may have trouble sleeping or concentrating. Medicine can help you deal with these symptoms. You may struggle with changing your smoking habits and rituals. The last step is the tricky one: Be prepared for the smoking urge to continue for a time. This is a lot to deal with, but keep at it. You will feel better. Follow-up care is a key part of your treatment and safety. Be sure to make and go to all appointments, and call your doctor if you are having problems. It's also a good idea to know your test results and keep a list of the medicines you take. How can you care for yourself at home? · Ask your family, friends, and coworkers for support. You have a better chance of quitting if you have help and support. · Join a support group, such as Nicotine Anonymous, for people who are trying to quit smoking. · Consider signing up for a smoking cessation program, such as the American Lung Association's Freedom from Smoking program.  · Get text messaging support. Go to the website at www.smokefree. gov to sign up for the [facility] program.  · Set a quit date. Pick your date carefully so that it is not right in the middle of a big deadline or stressful time. Once you quit, do not even take a puff. Get rid of all ashtrays and lighters after your last cigarette. Clean your house and your clothes so that they do not smell of smoke. · Learn how to be a nonsmoker. Think about ways you can avoid those things that make you reach for a cigarette. ? Avoid situations that put you at greatest risk for smoking. For some people, it is hard to have a drink with friends without smoking. For others, they might skip a coffee break with coworkers who smoke. ? Change your daily routine. Take a different route to work or eat a meal in a different place. · Cut down on stress.  Calm yourself or release tension by doing an activity you enjoy, such as reading a book, taking a hot bath, or

## 2020-01-22 NOTE — PROGRESS NOTES
Neela Armendariz is a 79 y.o. male who presents today for   Chief Complaint   Patient presents with    Follow-up     6 weeks.  Anxiety    Medication Check    Nicotine Dependence       HPI  72 y/o WM here for f/u on nicotine dependence but insurance would not pay for chantix and it was going to be $400-500 so he is on wellbutrin XL currently but he thought he was suppose to take it twice daily so he is actually on 300 mg daily. He does not think he feels a lot different but he is smoking 1 ppd vs 1.5 ppd which is an improvement. He did not realize nicotine patches were called in for in him to the pharmacy to help so he has not started these. His wife thinks his cough has improved since he has cut back on smoking also. He seems to be tolerating the medication currently. He has had some constipation since he had his colonoscopy 2 mths ago. Review of Systems   Constitutional: Negative for appetite change, chills, fatigue, fever and unexpected weight change. HENT: Negative for ear pain, rhinorrhea, sinus pressure, sore throat and voice change. Eyes: Negative for pain, discharge and redness. Respiratory: Positive for cough. Negative for chest tightness, shortness of breath and wheezing. Cardiovascular: Negative for chest pain and palpitations. Gastrointestinal: Positive for constipation. Negative for abdominal pain, blood in stool, diarrhea and vomiting. Endocrine: Negative for cold intolerance, heat intolerance and polydipsia. Genitourinary: Negative for dysuria and hematuria. Musculoskeletal: Positive for arthralgias. Negative for myalgias, neck pain and neck stiffness. See HPI, also has OA in fingers/hands   Skin: Negative for color change and rash. Neurological: Negative for dizziness, tremors, syncope, speech difficulty, weakness, numbness and headaches. Hematological: Negative for adenopathy. Does not bruise/bleed easily.    Psychiatric/Behavioral: Negative for confusion, Problems Paternal Grandfather     Pancreatic Cancer Daughter     Diabetes type 2  Daughter     Thyroid Disease Daughter     High Cholesterol Daughter     Colon Cancer Neg Hx     Esophageal Cancer Neg Hx     Liver Cancer Neg Hx     Liver Disease Neg Hx     Rectal Cancer Neg Hx     Stomach Cancer Neg Hx        /70   Pulse 75   Temp 98 °F (36.7 °C)   Ht 6' 3\" (1.905 m)   Wt 197 lb 12.8 oz (89.7 kg)   SpO2 96%   BMI 24.72 kg/m²     Physical Exam  Vitals signs reviewed. Constitutional:       General: He is not in acute distress. Appearance: He is not ill-appearing. HENT:      Head: Normocephalic and atraumatic. Right Ear: External ear normal.      Left Ear: External ear normal.      Nose: No rhinorrhea. Mouth/Throat:      Mouth: Mucous membranes are moist.   Eyes:      General:         Right eye: No discharge. Left eye: No discharge. Conjunctiva/sclera: Conjunctivae normal.      Pupils: Pupils are equal, round, and reactive to light. Neck:      Musculoskeletal: Normal range of motion and neck supple. Thyroid: No thyromegaly. Vascular: No carotid bruit or JVD. Trachea: Trachea normal. No tracheal tenderness. Cardiovascular:      Rate and Rhythm: Normal rate and regular rhythm. Pulses:           Carotid pulses are 2+ on the right side and 2+ on the left side. Posterior tibial pulses are 2+ on the right side and 2+ on the left side. Heart sounds: Normal heart sounds. No murmur. No friction rub. No gallop. Pulmonary:      Effort: Pulmonary effort is normal. No accessory muscle usage. Breath sounds: Normal breath sounds. No decreased breath sounds, wheezing or rhonchi. Abdominal:      General: Bowel sounds are normal. There is no distension. Palpations: Abdomen is soft. There is no hepatomegaly or splenomegaly. Tenderness: There is no abdominal tenderness.    Musculoskeletal:      Right wrist: Normal.      Left wrist: Normal.      Left hip: Normal.      Right ankle: Normal.      Left ankle: Normal.   Lymphadenopathy:      Head:      Right side of head: No submandibular adenopathy. Left side of head: No submandibular adenopathy. Cervical: No cervical adenopathy. Upper Body:      Right upper body: No supraclavicular adenopathy. Left upper body: No supraclavicular adenopathy. Skin:     General: Skin is warm. Capillary Refill: Capillary refill takes less than 2 seconds. Coloration: Skin is not cyanotic. Findings: No rash. Nails: There is no clubbing. Neurological:      Mental Status: He is alert and oriented to person, place, and time. Cranial Nerves: No cranial nerve deficit or dysarthria. Sensory: Sensation is intact. Motor: Motor function is intact. No weakness, tremor, atrophy or abnormal muscle tone. Coordination: Coordination normal.      Deep Tendon Reflexes:      Reflex Scores:       Patellar reflexes are 2+ on the right side and 2+ on the left side. Comments: CN II-XII grossly intact, speech clear, no facial droop, MAEW. Psychiatric:         Mood and Affect: Mood and affect normal.         Speech: Speech normal.         Behavior: Behavior normal.         Thought Content: Thought content normal.         Cognition and Memory: Cognition and memory normal.         Judgment: Judgment normal.         Assessment:    ICD-10-CM    1. Anxiety F41.9 buPROPion (WELLBUTRIN XL) 300 MG extended release tablet   2. Personal history of tobacco use, presenting hazards to health Z87.891 nicotine (NICODERM CQ) 21 MG/24HR     buPROPion (WELLBUTRIN XL) 300 MG extended release tablet   3. Other constipation K59.09 docusate sodium (COLACE) 100 MG capsule       Plan:  Dada Jiang was seen today for follow-up, anxiety, medication check and nicotine dependence. Diagnoses and all orders for this visit:    Anxiety  -     buPROPion (WELLBUTRIN XL) 300 MG extended release tablet;  Take 1 as needed for Constipation     Dispense:  60 capsule     Refill:  3     Medications Discontinued During This Encounter   Medication Reason    buPROPion (ZYBAN) 150 MG extended release tablet DOSE ADJUSTMENT    nicotine (NICODERM CQ) 21 MG/24HR REORDER     Patient Instructions       Patient Education        Deciding About Using Medicines To Quit Smoking  How can you decide about using medicines to quit smoking? What are the medicines you can use? Your doctor may prescribe varenicline (Chantix) or bupropion (Zyban). These medicines can help you cope with cravings for tobacco. They are pills that don't contain nicotine. You also can use nicotine replacement products. These do contain nicotine. There are many types. · Gum and lozenges slowly release nicotine into your mouth. · Patches stick to your skin. They slowly release nicotine into your bloodstream.  · An inhaler has a meyers that contains nicotine. You breathe in a puff of nicotine vapor through your mouth and throat. · Nasal spray releases a mist that contains nicotine. What are key points about this decision? · Using medicines can double your chances of quitting smoking. They can ease cravings and withdrawal symptoms. · Getting counseling along with using medicine can raise your chances of quitting even more. · If you smoke fewer than 5 cigarettes a day, you may not need medicines to help you quit smoking. · These medicines have less nicotine than cigarettes. And by itself, nicotine is not nearly as harmful as smoking. The tars, carbon monoxide, and other toxic chemicals in tobacco cause the harmful effects. · The side effects of nicotine replacement products depend on the type of product. For example, a patch can make your skin red and itchy. Medicines in pill form can make you sick to your stomach. They can also cause dry mouth and trouble sleeping. For most people, the side effects are not bad enough to make them stop using the products.   Why and follow all instructions on the label. Do not use laxatives on a long-term basis. When should you call for help? Call your doctor now or seek immediate medical care if:    · You have new or worse belly pain.     · You have new or worse nausea or vomiting.     · You have blood in your stools.    Watch closely for changes in your health, and be sure to contact your doctor if:    · Your constipation is getting worse.     · You do not get better as expected. Where can you learn more? Go to https://[employer]eb[employer] [place]. org and sign in to your [employer] account. Enter 21 [phone] in the Active Storage box to learn more about \"Constipation: Care Instructions. \"     If you do not have an account, please click on the \"Sign Up Now\" link. Current as of: June 26, 2019  Content Version: 12.3  © 6829-9090 [employer]. Care instructions adapted under license by [facility] ([facility]). If you have questions about a medical condition or this instruction, always ask your healthcare professional. [place] [address] any warranty or liability for your use of this information. Patient Education        Stopping Smoking: Care Instructions  Your Care Instructions  Cigarette smokers crave the nicotine in cigarettes. Giving it up is much harder than simply changing a habit. Your body has to stop craving the nicotine. It is hard to quit, but you can do it. There are many tools that people use to quit smoking. You may find that combining tools works best for you. There are several steps to quitting. First you get ready to quit. Then you get support to help you. After that, you learn new skills and behaviors to become a nonsmoker. For many people, a necessary step is getting and using medicine. Your doctor will help you set up the plan that best meets your needs. You may want to attend a smoking cessation program to help you quit smoking.  When you choose a program, look for one that has proven success. Ask your doctor for ideas. You will greatly increase your chances of success if you take medicine as well as get counseling or join a cessation program.  Some of the changes you feel when you first quit tobacco are uncomfortable. Your body will miss the nicotine at first, and you may feel short-tempered and grumpy. You may have trouble sleeping or concentrating. Medicine can help you deal with these symptoms. You may struggle with changing your smoking habits and rituals. The last step is the tricky one: Be prepared for the smoking urge to continue for a time. This is a lot to deal with, but keep at it. You will feel better. Follow-up care is a key part of your treatment and safety. Be sure to make and go to all appointments, and call your doctor if you are having problems. It's also a good idea to know your test results and keep a list of the medicines you take. How can you care for yourself at home? · Ask your family, friends, and coworkers for support. You have a better chance of quitting if you have help and support. · Join a support group, such as Nicotine Anonymous, for people who are trying to quit smoking. · Consider signing up for a smoking cessation program, such as the American Lung Association's Freedom from Smoking program.  · Get text messaging support. Go to the website at www.smokefree. gov to sign up for the [facility] program.  · Set a quit date. Pick your date carefully so that it is not right in the middle of a big deadline or stressful time. Once you quit, do not even take a puff. Get rid of all ashtrays and lighters after your last cigarette. Clean your house and your clothes so that they do not smell of smoke. · Learn how to be a nonsmoker. Think about ways you can avoid those things that make you reach for a cigarette. ? Avoid situations that put you at greatest risk for smoking. For some people, it is hard to have a drink with friends without smoking.  For others, they might [employer], [facility] disclaims any warranty or liability for your use of this information. Patient voices understanding and agrees to plans along with risks and benefits of plan. Counseling:  Obed Day's case, medications and options werediscussed in detail. Patient was instructed to call the office if he   questions regarding him treatment. Should him conditions worsen, he should return to office to be reassessed byDr. Julito Todd. he  Should to go the closest Emergency Department for any emergency. They verbalized understanding the above instructions. Return in about 3 months (around 4/22/2020) for recheck mood, anxiety.

## 2020-01-28 PROBLEM — Z12.11 SCREENING FOR COLON CANCER: Status: RESOLVED | Noted: 2019-06-27 | Resolved: 2020-01-28

## 2020-01-28 ASSESSMENT — ENCOUNTER SYMPTOMS
COUGH: 1
CONSTIPATION: 1

## 2020-12-02 ENCOUNTER — OFFICE VISIT (OUTPATIENT)
Dept: FAMILY MEDICINE CLINIC | Age: 70
End: 2020-12-02
Payer: MEDICARE

## 2020-12-02 VITALS
BODY MASS INDEX: 24.76 KG/M2 | OXYGEN SATURATION: 99 % | TEMPERATURE: 97.6 F | HEIGHT: 75 IN | HEART RATE: 51 BPM | DIASTOLIC BLOOD PRESSURE: 82 MMHG | WEIGHT: 199.13 LBS | SYSTOLIC BLOOD PRESSURE: 132 MMHG

## 2020-12-02 PROCEDURE — G0439 PPPS, SUBSEQ VISIT: HCPCS | Performed by: INTERNAL MEDICINE

## 2020-12-02 ASSESSMENT — PATIENT HEALTH QUESTIONNAIRE - PHQ9
1. LITTLE INTEREST OR PLEASURE IN DOING THINGS: 0
SUM OF ALL RESPONSES TO PHQ QUESTIONS 1-9: 0
SUM OF ALL RESPONSES TO PHQ9 QUESTIONS 1 & 2: 0
SUM OF ALL RESPONSES TO PHQ QUESTIONS 1-9: 0
2. FEELING DOWN, DEPRESSED OR HOPELESS: 0
SUM OF ALL RESPONSES TO PHQ QUESTIONS 1-9: 0

## 2020-12-02 NOTE — PROGRESS NOTES
Medicare Annual Wellness Visit  Name: Raiza Luz Date: 2020   MRN: [id number] Sex: Male   Age: 79 y.o. Ethnicity: Non-/Non    : 1950 Race: Janna Raymundo is here for Medicare AWV    Screenings for behavioral, psychosocial and functional/safety risks, and cognitive dysfunction are all negative except as indicated below. These results, as well as other patient data from the [address] form, are documented in Flowsheets linked to this Encounter.     No Known Allergies      Prior to Visit Medications    Not on File         Past Medical History:   Diagnosis Date    Nocturia 2019       Past Surgical History:   Procedure Laterality Date    CATARACT REMOVAL Bilateral     COLONOSCOPY N/A 2019    Dr DAYANA Eddy-Tubulovillous AP, (-) dysplasia x 1, tubular AP, (-) dysplasia x 4, BCM x 1, 3 yr recall    FRACTURE SURGERY Right     shoulder, ball and joint    HUMERUS FRACTURE SURGERY Right     LEG SURGERY Right     femur fracture    SHOULDER SURGERY Right 1965    shoulder fracture, ORIF, football injury         Family History   Problem Relation Age of Onset    Diabetes Mother     Colon Polyps Mother     Lung Cancer Father     No Known Problems Sister     Other Brother         disability with back    No Known Problems Maternal Grandmother     Heart Disease Maternal Grandfather     No Known Problems Paternal Grandmother     No Known Problems Paternal Grandfather     Pancreatic Cancer Daughter     Diabetes type 2  Daughter     Thyroid Disease Daughter     High Cholesterol Daughter     Colon Cancer Neg Hx     Esophageal Cancer Neg Hx     Liver Cancer Neg Hx     Liver Disease Neg Hx     Rectal Cancer Neg Hx     Stomach Cancer Neg Hx        CareTeam (Including outside providers/suppliers regularly involved in providing care):   Patient Care Team:  Jailene Dugan MD as PCP - General (Pediatrics)  Jailene Dugan MD as PCP - REHABILITATION [facility] Empaneled Provider    Wt Readings from Last 3 Encounters:   12/02/20 199 lb 2 oz (90.3 kg)   01/22/20 197 lb 12.8 oz (89.7 kg)   12/06/19 197 lb 6.4 oz (89.5 kg)     Vitals:    12/02/20 1444 12/02/20 1453   BP: (!) 150/88 132/82   Pulse: 51    Temp: 97.6 °F (36.4 °C)    SpO2: 99%    Weight: 199 lb 2 oz (90.3 kg)    Height: 6' 3\" (1.905 m)      Body mass index is 24.89 kg/m². Based upon direct observation of the patient, evaluation of cognition reveals recent and remote memory intact. General Appearance: alert and oriented to person, place and time, well developed and well- nourished, in no acute distress  Skin: warm and dry, no rash or erythema  Head: normocephalic and atraumatic  Eyes: pupils equal, round, and reactive to light, extraocular eye movements intact, conjunctivae normal  ENT: tympanic membrane, external ear and ear canal normal bilaterally, nose without deformity, nasal mucosa and turbinates normal without polyps  Neck: supple and non-tender without mass, no thyromegaly or thyroid nodules, no cervical lymphadenopathy  Pulmonary/Chest: clear to auscultation bilaterally- no wheezes, rales or rhonchi, normal air movement, no respiratory distress  Cardiovascular: normal rate, regular rhythm, normal S1 and S2, no murmurs, rubs, clicks, or gallops, distal pulses intact, no carotid bruits  Abdomen: soft, non-tender, non-distended, normal bowel sounds, no masses or organomegaly  Extremities: no cyanosis, clubbing or edema  Musculoskeletal: normal range of motion, no joint swelling, deformity or tenderness  Neurologic: reflexes normal and symmetric, no cranial nerve deficit, gait, coordination and speech normal    Patient's complete Health Risk Assessment and screening values have been reviewed and are found in Flowsheets. The following problems were reviewed today and where indicated follow up appointments were made and/or referrals ordered.     Positive Risk Factor Screenings with Interventions: Substance History:  Social History     Tobacco History     Smoking Status  Current Every Day Smoker Smoking Frequency  1 pack/day for 47 years (52 pk yrs) Smoking Tobacco Type  Cigarettes    Smokeless Tobacco Use  Never Used          Alcohol History     Alcohol Use Status  Yes Drinks/Week  14 Cans of beer per week Amount  14.0 standard drinks of alcohol/wk          Drug Use     Drug Use Status  Never          Sexual Activity     Sexually Active  Not Asked               Alcohol Screening:       A score of 8 or more is associated with harmful or hazardous drinking. A score of 13 or more in women, and 15 or more in men, is likely to indicate alcohol dependence. Substance Abuse Interventions:  · Tobacco abuse:  tobacco cessation tips and resources provided, patient agrees to think about his/her triggers for tobacco use, why he/she should quit, and learn more about the harmful effects of tobacco, and nicotine patches and wellbutrin did not help. Patient is smoking less and is down to 3/4 ppd of cigarettes. General Health and ACP:  General  In general, how would you say your health is?: Excellent  In the past 7 days, have you experienced any of the following?  New or Increased Pain, New or Increased Fatigue, Loneliness, Social Isolation, Stress or Anger?: None of These  Do you get the social and emotional support that you need?: Yes  Do you have a Living Will?: (!) No  Advance Directives     Power of [address] [place] [address] Will ACP-Advance Directive ACP-Power of     Not on File Not on File Filed [address] [place] [address] Risk Interventions:  · No Living Will: Advance Care Planning addressed with patient today    Health Habits/Nutrition:  Health Habits/Nutrition  Do you exercise for at least 20 minutes 2-3 times per week?: Yes  Have you lost any weight without trying in the past 3 months?: No  Do you eat fewer than 2 meals per day?: No  Have you seen a dentist within the past year?: (!) No  Body mass index: 24.89  Health Habits/Nutrition Interventions:  · Dental exam overdue:  patient has upper/lower dentures    Safety:  Safety  Do you have working smoke detectors?: Yes  Have all throw rugs been removed or fastened?: Yes  Do you have non-slip mats or surfaces in all bathtubs/showers?: Yes  Do all of your stairways have a railing or banister?: Yes  Are your doorways, halls and stairs free of clutter?: (!) No  Do you always fasten your seatbelt when you are in a car?: Yes  Safety Interventions:  · Home safety tips provided    Personalized Preventive Plan   Current Health Maintenance Status    There is no immunization history on file for this patient. Health Maintenance   Topic Date Due    Hepatitis C screen  1950    Annual Wellness Visit (AWV)  06/23/2019    Pneumococcal 65+ years Vaccine (1 of 1 - PPSV23) 12/02/2020 (Originally 1/15/2015)    AAA screen  06/30/2021 (Originally 1950)    DTaP/Tdap/Td vaccine (1 - Tdap) 12/02/2021 (Originally 1/15/1969)    Flu vaccine (1) 12/02/2021 (Originally 9/1/2020)    Shingles Vaccine (1 of 2) 12/02/2021 (Originally 1/15/2000)    Colon cancer screen colonoscopy  11/07/2022    Lipid screen  07/02/2024    Hepatitis A vaccine  Aged Out    Hepatitis B vaccine  Aged Out    Hib vaccine  Aged Out    Meningococcal (ACWY) vaccine  Aged Out    Low dose CT lung screening  Discontinued     Recommendations for Preventive Services Due: see orders and patient instructions/AVS.  . Recommended screening schedule for the next 5-10 years is provided to the patient in written form: see Patient Wanda Burgess was seen today for medicare awv. Diagnoses and all orders for this visit:    Medicare annual wellness visit, subsequent    Routine general medical examination at a health care facility    Personal history of tobacco use    Screening for prostate cancer  -     Psa screening;  Future    Screening for deficiency anemia  -     CBC Auto Differential; Future    Screening for hyperlipidemia  -     Comprehensive Metabolic Panel; Future  -     Lipid Panel; Future        1. Patient declines influenza vaccine, Pneumovax, Tdap, and Shingrix vaccine after risks and benefits of treatment discussed. 2.  Patient declines yearly screening for lung cancer with low-dose CT lung scan but this was normal in October 2019. Patient says he may reconsider in the next 3 to 6 months however. 3.  Patient declines screening for abdominal aortic aneurysm after risks and benefits of screening discussed with patient today but says he may reconsider next year. 4.  Patient counseled on smoking cessation for 5 to 7 minutes and although he declines medication and nicotine replacement for help in smoking cessation as he did not feel it was helpful previously, he will continue to work on cutting back on smoking. 5.  Screening labs including PSA ordered for complete physical with prostate exam in 3 months.

## 2020-12-02 NOTE — PATIENT INSTRUCTIONS
Learning About Bebeto Angel  What is a living will? A living will, also called a declaration, is a legal form. It tells your family and your doctor your wishes when you can't speak for yourself. It's used by the health professionals who will treat you as you near the end of your life or if you get seriously hurt or ill. If you put your wishes in writing, your loved ones and others will know what kind of care you want. They won't need to guess. This can ease your mind and be helpful to others. And you can change or cancel your living will at any time. A living will is not the same as an estate or property will. An estate will explains what you want to happen with your money and property after you die. How do you use it? A living will is used to describe the kinds of treatment or life support you want as you near the end of your life or if you get seriously hurt or ill. Keep these facts in mind about living freed. · Your living will is used only if you can't speak or make decisions for yourself. Most often, one or more doctors must certify that you can't speak or decide for yourself before your living will takes effect. · If you get better and can speak for yourself again, you can accept or refuse any treatment. It doesn't matter what you said in your living will. · Some states may limit your right to refuse treatment in certain cases. For example, you may need to clearly state in your living will that you don't want artificial hydration and nutrition, such as being fed through a tube. Is a living will a legal document? A living will is a legal document. Each state has its own laws about living freed. And a living will may be called something else in your state. Here are some things to know about living freed. · You don't need an  to complete a living will. But legal advice can be helpful if your state's laws are unclear.  It can also help if your health history is complicated or your family can't agree on what should be in your living will. · You can change your living will at any time. Some people find that their wishes about end-of-life care change as their health changes. If you make big changes to your living will, complete a new form. · If you move to another state, make sure that your living will is legal in the state where you now live. In most cases, doctors will respect your wishes even if you have a form from a different state. · You might use a universal form that has been approved by many states. This kind of form can sometimes be filled out and stored online. Your digital copy will then be available wherever you have a connection to the internet. The doctors and nurses who need to treat you can find it right away. · Your state may offer an online registry. This is another place where you can store your living will online. · It's a good idea to get your living will notarized. This means using a person called a  to watch two people sign, or witness, your living will. What should you know when you create a living will? Here are some questions to ask yourself as you make your living will:  · Do you know enough about life support methods that might be used? If not, talk to your doctor so you know what might be done if you can't breathe on your own, your heart stops, or you can't swallow. · What things would you still want to be able to do after you receive life-support methods? Would you want to be able to walk? To speak? To eat on your own? To live without the help of machines? · Do you want certain Spiritism practices performed if you become very ill? · If you have a choice, where do you want to be cared for? In your home? At a hospital or nursing home? · If you have a choice at the end of your life, where would you prefer to die? At home? In a hospital or nursing home? Somewhere else? · Would you prefer to be buried or cremated?   · Do you want your organs to be donated after you die? What should you do with your living will? · Make sure that your family members and your health care agent have copies of your living will (also called a declaration). · Give your doctor a copy of your living will. Ask him or her to keep it as part of your medical record. If you have more than one doctor, make sure that each one has a copy. · Put a copy of your living will where it can be easily found. For example, some people may put a copy on their refrigerator door. If you are using a digital copy, be sure your doctor, family members, and health care agent know how to find and access it. Where can you learn more? Go to https://TapactivepeLanicaeweb.Nduo.cn. org and sign in to your [employer] account. Enter U245 in the Tenlegs box to learn more about \"Learning About Living Perroy. \"     If you do not have an account, please click on the \"Sign Up Now\" link. Current as of: December 9, 2019               Content Version: 12.6  © 4225-1393 [employer]. Care instructions adapted under license by [facility] ([facility]). If you have questions about a medical condition or this instruction, always ask your healthcare professional. [place] [address] any warranty or liability for your use of this information. Stopping Smoking: Care Instructions  Your Care Instructions     Cigarette smokers crave the nicotine in cigarettes. Giving it up is much harder than simply changing a habit. Your body has to stop craving the nicotine. It is hard to quit, but you can do it. There are many tools that people use to quit smoking. You may find that combining tools works best for you. There are several steps to quitting. First you get ready to quit. Then you get support to help you. After that, you learn new skills and behaviors to become a nonsmoker. For many people, a necessary step is getting and using medicine.   Your doctor will help you set up the plan that best meets your needs. You may want to attend a smoking cessation program to help you quit smoking. When you choose a program, look for one that has proven success. Ask your doctor for ideas. You will greatly increase your chances of success if you take medicine as well as get counseling or join a cessation program.  Some of the changes you feel when you first quit tobacco are uncomfortable. Your body will miss the nicotine at first, and you may feel short-tempered and grumpy. You may have trouble sleeping or concentrating. Medicine can help you deal with these symptoms. You may struggle with changing your smoking habits and rituals. The last step is the tricky one: Be prepared for the smoking urge to continue for a time. This is a lot to deal with, but keep at it. You will feel better. Follow-up care is a key part of your treatment and safety. Be sure to make and go to all appointments, and call your doctor if you are having problems. It's also a good idea to know your test results and keep a list of the medicines you take. How can you care for yourself at home? · Ask your family, friends, and coworkers for support. You have a better chance of quitting if you have help and support. · Join a support group, such as Nicotine Anonymous, for people who are trying to quit smoking. · Consider signing up for a smoking cessation program, such as the American Lung Association's Freedom from Smoking program.  · Get text messaging support. Go to the website at www.smokefree. gov to sign up for the [facility] program.  · Set a quit date. Pick your date carefully so that it is not right in the middle of a big deadline or stressful time. Once you quit, do not even take a puff. Get rid of all ashtrays and lighters after your last cigarette. Clean your house and your clothes so that they do not smell of smoke. · Learn how to be a nonsmoker. Think about ways you can avoid those things that make you reach for a cigarette. ?  Avoid situations that put you at greatest risk for smoking. For some people, it is hard to have a drink with friends without smoking. For others, they might skip a coffee break with coworkers who smoke. ? Change your daily routine. Take a different route to work or eat a meal in a different place. · Cut down on stress. Calm yourself or release tension by doing an activity you enjoy, such as reading a book, taking a hot bath, or gardening. · Talk to your doctor or pharmacist about nicotine replacement therapy, which replaces the nicotine in your body. You still get nicotine but you do not use tobacco. Nicotine replacement products help you slowly reduce the amount of nicotine you need. These products come in several forms, many of them available over-the-counter:  ? Nicotine patches  ? Nicotine gum and lozenges  ? Nicotine inhaler  · Ask your doctor about bupropion (Wellbutrin) or varenicline (Chantix), which are prescription medicines. They do not contain nicotine. They help you by reducing withdrawal symptoms, such as stress and anxiety. · Some people find hypnosis, acupuncture, and massage helpful for ending the smoking habit. · Eat a healthy diet and get regular exercise. Having healthy habits will help your body move past its craving for nicotine. · Be prepared to keep trying. Most people are not successful the first few times they try to quit. Do not get mad at yourself if you smoke again. Make a list of things you learned and think about when you want to try again, such as next week, next month, or next year. Where can you learn more? Go to https://nahid.Green Momit. org and sign in to your [employer] account. Enter O588 in the 3D FUTURE VISION II box to learn more about \"Stopping Smoking: Care Instructions. \"     If you do not have an account, please click on the \"Sign Up Now\" link. Current as of: March 12, 2020               Content Version: 12.6  © 7609-0538 [employer], [facility].    Care instructions adapted under license by [facility] ([facility]). If you have questions about a medical condition or this instruction, always ask your healthcare professional. [address] any warranty or liability for your use of this information. Learning About Benefits From Quitting Smoking  How does quitting smoking make you healthier? If you're thinking about quitting smoking, you may have a few reasons to be smoke-free. Your health may be one of them. · When you quit smoking, you lower your risks for cancer, lung disease, heart attack, stroke, blood vessel disease, and blindness from macular degeneration. · When you're smoke-free, you get sick less often, and you heal faster. You are less likely to get colds, flu, bronchitis, and pneumonia. · As a nonsmoker, you may find that your mood is better and you are less stressed. When and how will you feel healthier? Quitting has real health benefits that start from day 1 of being smoke-free. And the longer you stay smoke-free, the healthier you get and the better you feel. The first hours  · After just 20 minutes, your blood pressure and heart rate go down. That means there's less stress on your heart and blood vessels. · Within 12 hours, the level of carbon monoxide in your blood drops back to normal. That makes room for more oxygen. With more oxygen in your body, you may notice that you have more energy than when you smoked. After 2 weeks  · Your lungs start to work better. · Your risk of heart attack starts to drop. After 1 month  · When your lungs are clear, you cough less and breathe deeper, so it's easier to be active. · Your sense of taste and smell return. That means you can enjoy food more than you have since you started smoking. Over the years  · Over the years, your risks of heart disease, heart attack, and stroke are lower. · After 10 years, your risk of dying from lung cancer is cut by about half.  And your risk for many other types of cancer is lower too. How would quitting help others in your life? When you quit smoking, you improve the health of everyone who now breathes in your smoke. · Their heart, lung, and cancer risks drop, much like yours. · They are sick less. For babies and small children, living smoke-free means they're less likely to have ear infections, pneumonia, and bronchitis. · If you're a woman who is or will be pregnant someday, quitting smoking means a healthier . · Children who are close to you are less likely to become adult smokers. Where can you learn more? Go to https://Valopaapepiceweb.healthBreakout Commerce. org and sign in to your [employer] account. Enter [id number] in the Gazoob box to learn more about \"Learning About Benefits From Quitting Smoking. \"     If you do not have an account, please click on the \"Sign Up Now\" link. Current as of: 2020               Content Version: 12.6  © 2112-0955 [employer], [employer]. Care instructions adapted under license by [facility] ([facility]). If you have questions about a medical condition or this instruction, always ask your healthcare professional. [place] [address] any warranty or liability for your use of this information. Personalized Preventive Plan for [place] - 2020  Medicare offers a range of preventive health benefits. Some of the tests and screenings are paid in full while other may be subject to a deductible, co-insurance, and/or copay. Some of these benefits include a comprehensive review of your medical history including lifestyle, illnesses that may run in your family, and various assessments and screenings as appropriate. After reviewing your medical record and screening and assessments performed today your provider may have ordered immunizations, labs, imaging, and/or referrals for you.   A list of these orders (if applicable) as well as your Preventive Care list are included within your After Visit Summary for your review. Other Preventive Recommendations:    · A preventive eye exam performed by an eye specialist is recommended every 1-2 years to screen for glaucoma; cataracts, macular degeneration, and other eye disorders. · A preventive dental visit is recommended every 6 months. · Try to get at least 150 minutes of exercise per week or 10,000 steps per day on a pedometer . · Order or download the FREE \"Exercise & Physical Activity: Your Everyday Guide\" from [employer]. Call 8-162.689.5206 or search [employer] online. · You need 3235-9554 mg of calcium and 9620-5973 IU of vitamin D per day. It is possible to meet your calcium requirement with diet alone, but a vitamin D supplement is usually necessary to meet this goal.  · When exposed to the sun, use a sunscreen that protects against both UVA and UVB radiation with an SPF of 30 or greater. Reapply every 2 to 3 hours or after sweating, drying off with a towel, or swimming. · Always wear a seat belt when traveling in a car. Always wear a helmet when riding a bicycle or motorcycle.

## 2021-03-11 DIAGNOSIS — Z12.5 SCREENING FOR PROSTATE CANCER: ICD-10-CM

## 2021-03-11 DIAGNOSIS — Z13.0 SCREENING FOR DEFICIENCY ANEMIA: ICD-10-CM

## 2021-03-11 DIAGNOSIS — Z13.220 SCREENING FOR HYPERLIPIDEMIA: ICD-10-CM

## 2021-03-11 LAB
ALBUMIN SERPL-MCNC: 4.5 G/DL (ref 3.5–5.2)
ALP BLD-CCNC: 84 U/L (ref 40–130)
ALT SERPL-CCNC: 14 U/L (ref 5–41)
ANION GAP SERPL CALCULATED.3IONS-SCNC: 11 MMOL/L (ref 7–19)
AST SERPL-CCNC: 17 U/L (ref 5–40)
BASOPHILS ABSOLUTE: 0.1 K/UL (ref 0–0.2)
BASOPHILS RELATIVE PERCENT: 1.3 % (ref 0–1)
BILIRUB SERPL-MCNC: 0.8 MG/DL (ref 0.2–1.2)
BUN BLDV-MCNC: 17 MG/DL (ref 8–23)
CALCIUM SERPL-MCNC: 9.5 MG/DL (ref 8.8–10.2)
CHLORIDE BLD-SCNC: 103 MMOL/L (ref 98–111)
CHOLESTEROL, TOTAL: 167 MG/DL (ref 160–199)
CO2: 25 MMOL/L (ref 22–29)
CREAT SERPL-MCNC: 0.9 MG/DL (ref 0.5–1.2)
EOSINOPHILS ABSOLUTE: 0.1 K/UL (ref 0–0.6)
EOSINOPHILS RELATIVE PERCENT: 1.5 % (ref 0–5)
GFR AFRICAN AMERICAN: >59
GFR NON-AFRICAN AMERICAN: >60
GLUCOSE BLD-MCNC: 98 MG/DL (ref 74–109)
HCT VFR BLD CALC: 49.6 % (ref 42–52)
HDLC SERPL-MCNC: 62 MG/DL (ref 55–121)
HEMOGLOBIN: 16.5 G/DL (ref 14–18)
IMMATURE GRANULOCYTES #: 0 K/UL
LDL CHOLESTEROL CALCULATED: 92 MG/DL
LYMPHOCYTES ABSOLUTE: 1.2 K/UL (ref 1.1–4.5)
LYMPHOCYTES RELATIVE PERCENT: 17.8 % (ref 20–40)
MCH RBC QN AUTO: 31.7 PG (ref 27–31)
MCHC RBC AUTO-ENTMCNC: 33.3 G/DL (ref 33–37)
MCV RBC AUTO: 95.4 FL (ref 80–94)
MONOCYTES ABSOLUTE: 0.7 K/UL (ref 0–0.9)
MONOCYTES RELATIVE PERCENT: 9.9 % (ref 0–10)
NEUTROPHILS ABSOLUTE: 4.7 K/UL (ref 1.5–7.5)
NEUTROPHILS RELATIVE PERCENT: 68.9 % (ref 50–65)
PDW BLD-RTO: 13 % (ref 11.5–14.5)
PLATELET # BLD: 192 K/UL (ref 130–400)
PMV BLD AUTO: 11.5 FL (ref 9.4–12.4)
POTASSIUM SERPL-SCNC: 4.3 MMOL/L (ref 3.5–5)
PROSTATE SPECIFIC ANTIGEN: 0.57 NG/ML (ref 0–4)
RBC # BLD: 5.2 M/UL (ref 4.7–6.1)
SODIUM BLD-SCNC: 139 MMOL/L (ref 136–145)
TOTAL PROTEIN: 7.7 G/DL (ref 6.6–8.7)
TRIGL SERPL-MCNC: 67 MG/DL (ref 0–149)
WBC # BLD: 6.8 K/UL (ref 4.8–10.8)

## 2021-03-23 PROBLEM — J44.9 CHRONIC OBSTRUCTIVE LUNG DISEASE (HCC): Status: ACTIVE | Noted: 2021-03-23

## 2021-03-30 ENCOUNTER — OFFICE VISIT (OUTPATIENT)
Dept: FAMILY MEDICINE CLINIC | Age: 71
End: 2021-03-30
Payer: MEDICARE

## 2021-03-30 VITALS
OXYGEN SATURATION: 96 % | HEART RATE: 76 BPM | HEIGHT: 75 IN | WEIGHT: 202 LBS | BODY MASS INDEX: 25.12 KG/M2 | SYSTOLIC BLOOD PRESSURE: 132 MMHG | TEMPERATURE: 98.3 F | DIASTOLIC BLOOD PRESSURE: 84 MMHG

## 2021-03-30 DIAGNOSIS — J44.9 COPD WITHOUT EXACERBATION (HCC): Primary | ICD-10-CM

## 2021-03-30 DIAGNOSIS — Z13.0 SCREENING FOR DEFICIENCY ANEMIA: ICD-10-CM

## 2021-03-30 DIAGNOSIS — Z53.20 LUNG CANCER SCREENING DECLINED BY PATIENT: ICD-10-CM

## 2021-03-30 DIAGNOSIS — Z13.220 SCREENING FOR HYPERLIPIDEMIA: ICD-10-CM

## 2021-03-30 DIAGNOSIS — Z12.5 SCREENING FOR PROSTATE CANCER: ICD-10-CM

## 2021-03-30 DIAGNOSIS — Z11.59 ENCOUNTER FOR HEPATITIS C SCREENING TEST FOR LOW RISK PATIENT: ICD-10-CM

## 2021-03-30 PROBLEM — J43.2 CENTRILOBULAR EMPHYSEMA (HCC): Status: RESOLVED | Noted: 2019-10-13 | Resolved: 2021-03-30

## 2021-03-30 PROCEDURE — 99213 OFFICE O/P EST LOW 20 MIN: CPT | Performed by: INTERNAL MEDICINE

## 2021-03-30 ASSESSMENT — ENCOUNTER SYMPTOMS
SHORTNESS OF BREATH: 0
EYE PAIN: 0
VOMITING: 0
EYE REDNESS: 0
COLOR CHANGE: 0
EYE DISCHARGE: 0
RHINORRHEA: 0
DIARRHEA: 0
WHEEZING: 0
SINUS PRESSURE: 0
VOICE CHANGE: 0
BLOOD IN STOOL: 0
COUGH: 0
SORE THROAT: 0
ABDOMINAL PAIN: 0
CHEST TIGHTNESS: 0

## 2021-03-30 ASSESSMENT — VISUAL ACUITY: OU: 1

## 2021-03-30 ASSESSMENT — PATIENT HEALTH QUESTIONNAIRE - PHQ9
SUM OF ALL RESPONSES TO PHQ QUESTIONS 1-9: 0
2. FEELING DOWN, DEPRESSED OR HOPELESS: 0
SUM OF ALL RESPONSES TO PHQ9 QUESTIONS 1 & 2: 0
1. LITTLE INTEREST OR PLEASURE IN DOING THINGS: 0
SUM OF ALL RESPONSES TO PHQ QUESTIONS 1-9: 0

## 2021-03-30 NOTE — PATIENT INSTRUCTIONS
Patient Education        Learning About COPD and How to Prevent Lung Infections  How do lung infections affect COPD? Lung infections like pneumonia and acute bronchitis are common causes of COPD flare-ups. And people who have COPD are more likely to get these lung infections, especially if they smoke. When you have COPD, it is important to know the symptoms of pneumonia and acute bronchitis and call your doctor if you have them. Symptoms include:  · A cough that brings up more mucus than usual.  · Fever. · Shortness of breath. What can you do to prevent these infections? Stay healthy   · Get the flu vaccine every year. · Get a pneumococcal vaccine shot. If you have had one before, ask your doctor whether you need another dose. Two different types of pneumococcal vaccines are recommended for people ages 72 and older. · If you must be around people with colds or the flu, wash your hands often. · Do not smoke. This is the most important step you can take to prevent more damage to your lungs. If you need help quitting, talk to your doctor about stop-smoking programs and medicines. These can increase your chances of quitting for good. · Avoid secondhand smoke, air pollution, and high altitudes. Also avoid cold, dry air and hot, humid air. Stay at home with your windows closed when air pollution is bad. Exercise and eat well   · If your doctor recommends it, get more exercise. Walking is a good choice. Bit by bit, increase the amount you walk every day. Try for at least 30 minutes on most days of the week. · Eat regular, well-balanced meals. Eating right keeps your energy levels up and helps your body fight infection. · Get plenty of rest and sleep. Follow-up care is a key part of your treatment and safety. Be sure to make and go to all appointments, and call your doctor if you are having problems. It's also a good idea to know your test results and keep a list of the medicines you take.   Where can you learn more? Go to https://chpepiceweb.[employer]. org and sign in to your Premise account. Enter S111 in the [facility] box to learn more about \"Learning About COPD and How to Prevent Lung Infections. \"     If you do not have an account, please click on the \"Sign Up Now\" link. Current as of: October 26, 2020               Content Version: 12.8  © 2006-2021 [email or web address]. Care instructions adapted under license by [facility] ([facility]). If you have questions about a medical condition or this instruction, always ask your healthcare professional. [place] [address] any warranty or liability for your use of this information. Patient Education        Learning About Lung Cancer Screening  What is screening for lung cancer? Lung cancer screening is a way to find some lung cancers early, before a person has any symptoms of the cancer. Lung cancer screening may help those who have the highest risk for lung cancer--people 50 to 55 and older who are or were heavy smokers. For most people, who aren't at increased risk, screening for lung cancer probably isn't helpful. Screening won't prevent cancer. And it may not find all lung cancers. Lung cancer screening may lower the risk of dying from lung cancer in a small number of people. How is it done? Lung cancer screening is done with a low-dose CT (computed tomography) scan. A CT scan uses X-rays, or radiation, to make detailed pictures of your body. Experts recommend that screening be done in medical centers that focus on finding and treating lung cancer. Who is screening recommended for? Lung cancer screening is recommended for people who are or were heavy smokers. That means people with a smoking history of at least 20 or 30 pack years. A pack year is a way to measure how heavy a smoker you are or were.   To figure out your pack years, multiply how many packs a day on average (assuming 20 cigarettes per pack) you have smoked by how many years you have smoked. For example:  · If you smoked 1 pack a day for 15 years, that's 1 times 15. So you have a smoking history of 15 pack years. · If you smoked 1 pack a day for 20 years, that's 1 times 20. So you have a smoking history of 20 pack years. · If you smoked 2 packs a day for 15 years, that's 2 times 15. So you have a smoking history of 30 pack years. Experts agree that screening is for people who have a high risk of lung cancer. But experts don't agree on what high risk means. Some say people age 48 or older with at least a 20-pack-year smoking history are high risk. Others say it's people age 54 or older with a 30-pack-year history. Experts may also have other rules about who should be screened. For example, here is the guideline from the U.S. Preventive Services Task Force, which recommends lung cancer screening if:  · You are 54to [de-identified]years old. · And you have a smoking history of at least 30 pack years. · And you still smoke, or you quit within the last 15 years. · And you are not in poor health overall. (Having a serious health problem might mean that you couldn't have treatment for lung cancer. The treatment could be too high-risk, and it might not help you live longer.)  What are the risks of screening? CT screening for lung cancer isn't perfect. It can show an abnormal result when it turns out there wasn't any cancer. This is called a false-positive result. This means you may need more tests to make sure you don't have cancer. These tests can be harmful and cause a lot of worry. These tests may include more CT scans and invasive testing like a lung biopsy. In a biopsy, the doctor takes a sample of tissue from inside your lung so it can be looked at under a microscope. A biopsy is the only way to tell if you have lung cancer. If the biopsy finds cancer, you and your doctor will have to decide how or whether to treat it.   Some lung cancers found on CT scans are harmless and would not have caused a problem if they had not been found through screening. But because doctors can't tell which ones will turn out to be harmless, most will be treated. This means that you may get treatment--including surgery, radiation, or chemotherapy--that you don't need. There is a risk of damage to cells or tissue from being exposed to radiation, including the small amounts used in CTs, X-rays, and other medical tests. Over time, exposure to radiation may cause cancer and other health problems. But in most cases, the risk of getting cancer from being exposed to small amounts of radiation is low. It's not a reason to avoid these tests for most people. What are the benefits of screening? Your scan may be normal (negative). For some people who are at higher risk, screening lowers the chance of dying of lung cancer. How much and how long you smoked helps to determine your risk level. Screening can find some cancers early, when treatment may be more likely to work. What happens after screening? The results of your CT scan will be sent to your doctor. Someone from your care team will explain the results of your scan and answer any questions you may have. If you need any follow-up, he or she will help you understand what to do next. After a lung cancer screening, you can go back to your usual activities right away. A lung cancer screening test can't tell if you have lung cancer. If your results are positive, your doctor can't tell whether an abnormal finding is a harmless nodule, cancer, or something else without doing more tests. What can you do to help prevent lung cancer? Some lung cancers can't be prevented. But if you smoke, quitting smoking is the best step you can take to prevent lung cancer. If you want to quit, your doctor can recommend medicines or other ways to help. Follow-up care is a key part of your treatment and safety.  Be sure to make and go to all appointments, and call your doctor if you are having problems. It's also a good idea to know your test results and keep a list of the medicines you take. Where can you learn more? Go to https://[employer]ocDoc. org and sign in to your [employer] account. Enter Z562 in the [facility] box to learn more about \"Learning About Lung Cancer Screening. \"     If you do not have an account, please click on the \"Sign Up Now\" link. Current as of: December 17, 2020               Content Version: 12.8  © 6247-5438 [employer]. Care instructions adapted under license by [facility] ([facility]). If you have questions about a medical condition or this instruction, always ask your healthcare professional. [address] any warranty or liability for your use of this information. Patient Education        Stopping Smoking: Care Instructions  Your Care Instructions     Cigarette smokers crave the nicotine in cigarettes. Giving it up is much harder than simply changing a habit. Your body has to stop craving the nicotine. It is hard to quit, but you can do it. There are many tools that people use to quit smoking. You may find that combining tools works best for you. There are several steps to quitting. First you get ready to quit. Then you get support to help you. After that, you learn new skills and behaviors to become a nonsmoker. For many people, a necessary step is getting and using medicine. Your doctor will help you set up the plan that best meets your needs. You may want to attend a smoking cessation program to help you quit smoking. When you choose a program, look for one that has proven success. Ask your doctor for ideas. You will greatly increase your chances of success if you take medicine as well as get counseling or join a cessation program.  Some of the changes you feel when you first quit tobacco are uncomfortable.  Your body will miss the nicotine at first, and you may feel short-tempered and grumpy. You may have trouble sleeping or concentrating. Medicine can help you deal with these symptoms. You may struggle with changing your smoking habits and rituals. The last step is the tricky one: Be prepared for the smoking urge to continue for a time. This is a lot to deal with, but keep at it. You will feel better. Follow-up care is a key part of your treatment and safety. Be sure to make and go to all appointments, and call your doctor if you are having problems. It's also a good idea to know your test results and keep a list of the medicines you take. How can you care for yourself at home? · Ask your family, friends, and coworkers for support. You have a better chance of quitting if you have help and support. · Join a support group, such as Nicotine Anonymous, for people who are trying to quit smoking. · Consider signing up for a smoking cessation program, such as the American Lung Association's Freedom from Smoking program.  · Get text messaging support. Go to the website at www.smokefree. gov to sign up for the [facility] program.  · Set a quit date. Pick your date carefully so that it is not right in the middle of a big deadline or stressful time. Once you quit, do not even take a puff. Get rid of all ashtrays and lighters after your last cigarette. Clean your house and your clothes so that they do not smell of smoke. · Learn how to be a nonsmoker. Think about ways you can avoid those things that make you reach for a cigarette. ? Avoid situations that put you at greatest risk for smoking. For some people, it is hard to have a drink with friends without smoking. For others, they might skip a coffee break with coworkers who smoke. ? Change your daily routine. Take a different route to work or eat a meal in a different place. · Cut down on stress. Calm yourself or release tension by doing an activity you enjoy, such as reading a book, taking a hot bath, or gardening.   · Talk to your doctor or pharmacist about nicotine replacement therapy, which replaces the nicotine in your body. You still get nicotine but you do not use tobacco. Nicotine replacement products help you slowly reduce the amount of nicotine you need. These products come in several forms, many of them available over-the-counter:  ? Nicotine patches  ? Nicotine gum and lozenges  ? Nicotine inhaler  · Ask your doctor about bupropion (Wellbutrin) or varenicline (Chantix), which are prescription medicines. They do not contain nicotine. They help you by reducing withdrawal symptoms, such as stress and anxiety. · Some people find hypnosis, acupuncture, and massage helpful for ending the smoking habit. · Eat a healthy diet and get regular exercise. Having healthy habits will help your body move past its craving for nicotine. · Be prepared to keep trying. Most people are not successful the first few times they try to quit. Do not get mad at yourself if you smoke again. Make a list of things you learned and think about when you want to try again, such as next week, next month, or next year. Where can you learn more? Go to https://UEIS.CloudJay. org and sign in to your ShoutWire account. Enter L521 in the [employer] box to learn more about \"Stopping Smoking: Care Instructions. \"     If you do not have an account, please click on the \"Sign Up Now\" link. Current as of: March 12, 2020               Content Version: 12.8  © 7880-0849 [employer]wise[employer]. Care instructions adapted under license by [facility] ([facility]). If you have questions about a medical condition or this instruction, always ask your healthcare professional. Meredith Garcia any warranty or liability for your use of this information.

## 2021-03-30 NOTE — PROGRESS NOTES
Clinton Kumar is a 70 y.o. male who presents today for   Chief Complaint   Patient presents with    Annual Exam       HPI  69 y/o WM here for routine physical with prostate exam and review of screening labs. He has cut back to 3/4 of a ppd of cigarettes per day now for the past 2 mths. He had negative low dose CT lung scan in 2019 for lung cancer screening but declines repeat exam. He says he may consider later this year. He denies wheezing and SOA and has not had any COPD exacerbations in the past year. Patient usually declines vaccines but he plans to get the covid-19 Dirk Payer and Dirk Payer vaccine when available. He is taking a supplement from [employer] for prostate health and is only getting up at night to urinate once now and he is emptying bladder well with good stream.    Review of Systems   Constitutional: Negative for appetite change, chills, fatigue and fever. HENT: Negative for ear pain, rhinorrhea, sinus pressure, sore throat and voice change. Eyes: Negative for pain, discharge and redness. Respiratory: Negative for cough, chest tightness, shortness of breath and wheezing. Cardiovascular: Negative for chest pain and palpitations. Gastrointestinal: Negative for abdominal pain, blood in stool, diarrhea and vomiting. Endocrine: Negative for cold intolerance, heat intolerance and polydipsia. Genitourinary: Negative for dysuria and hematuria. Musculoskeletal: Negative for arthralgias, myalgias, neck pain and neck stiffness. Skin: Negative for color change and rash. Neurological: Negative for dizziness, tremors, syncope, speech difficulty, weakness, numbness and headaches. Hematological: Negative for adenopathy. Does not bruise/bleed easily. Psychiatric/Behavioral: Negative for confusion, dysphoric mood and sleep disturbance. The patient is not nervous/anxious. All other systems reviewed and are negative.       Past Medical History:   Diagnosis Date    Nocturia 6/27/2019       No current outpatient medications on file. No current facility-administered medications for this visit. No Known Allergies    Past Surgical History:   Procedure Laterality Date    CATARACT REMOVAL Bilateral     COLONOSCOPY N/A 11/7/2019    Dr Grupo Eddy-Tubulovillous AP, (-) dysplasia x 1, tubular AP, (-) dysplasia x 4, BCM x 1, 3 yr recall    FRACTURE SURGERY Right     shoulder, ball and joint    HUMERUS FRACTURE SURGERY Right     LEG SURGERY Right     femur fracture    SHOULDER SURGERY Right 1965    shoulder fracture, ORIF, football injury       Social History     Tobacco Use    Smoking status: Current Every Day Smoker     Packs/day: 1.00     Years: 47.00     Pack years: 47.00     Types: Cigarettes    Smokeless tobacco: Never Used   Substance Use Topics    Alcohol use: Yes     Alcohol/week: 14.0 standard drinks     Types: 14 Cans of beer per week     Frequency: 4 or more times a week     Drinks per session: 1 or 2     Binge frequency: Less than monthly    Drug use: Never       Family History   Problem Relation Age of Onset    Diabetes Mother     Colon Polyps Mother     Lung Cancer Father     No Known Problems Sister     Other Brother         disability with back    No Known Problems Maternal Grandmother     Heart Disease Maternal Grandfather     No Known Problems Paternal Grandmother     No Known Problems Paternal Grandfather     Pancreatic Cancer Daughter     Diabetes type 2  Daughter     Thyroid Disease Daughter     High Cholesterol Daughter     Colon Cancer Neg Hx     Esophageal Cancer Neg Hx     Liver Cancer Neg Hx     Liver Disease Neg Hx     Rectal Cancer Neg Hx     Stomach Cancer Neg Hx        /84   Pulse 76   Temp 98.3 °F (36.8 °C)   Ht 6' 3\" (1.905 m)   Wt 202 lb (91.6 kg)   SpO2 96%   BMI 25.25 kg/m²     Physical Exam  Vitals signs and nursing note reviewed. Constitutional:       General: He is not in acute distress. Appearance: Normal appearance.  He is well-developed, well-groomed and normal weight. He is not ill-appearing, toxic-appearing or diaphoretic. HENT:      Head: Normocephalic and atraumatic. Right Ear: Tympanic membrane, ear canal and external ear normal.      Left Ear: Tympanic membrane, ear canal and external ear normal.      Nose: Nose normal.      Mouth/Throat:      Lips: Pink. Mouth: Mucous membranes are moist. No oral lesions. Tongue: No lesions. Palate: No mass and lesions. Pharynx: Oropharynx is clear. Uvula midline. No pharyngeal swelling, oropharyngeal exudate, posterior oropharyngeal erythema or uvula swelling. Tonsils: 1+ on the right. 1+ on the left. Eyes:      General: Lids are normal. Vision grossly intact. No scleral icterus. Extraocular Movements: Extraocular movements intact. Conjunctiva/sclera: Conjunctivae normal.      Pupils: Pupils are equal, round, and reactive to light. Neck:      Musculoskeletal: Normal range of motion and neck supple. Thyroid: No thyroid mass or thyromegaly. Vascular: No carotid bruit or JVD. Trachea: Trachea and phonation normal.   Cardiovascular:      Rate and Rhythm: Normal rate and regular rhythm. Pulses: Normal pulses. Radial pulses are 2+ on the right side and 2+ on the left side. Posterior tibial pulses are 2+ on the right side and 2+ on the left side. Heart sounds: Normal heart sounds. No murmur. No friction rub. No gallop. Pulmonary:      Effort: Pulmonary effort is normal. No accessory muscle usage. Breath sounds: Normal breath sounds. No decreased breath sounds, wheezing, rhonchi or rales. Abdominal:      General: Abdomen is flat. Bowel sounds are normal. There is no distension. Palpations: Abdomen is soft. There is no hepatomegaly, splenomegaly or mass. Tenderness: There is no abdominal tenderness. There is no guarding or rebound. Hernia: No hernia is present.    Genitourinary:     Prostate: Normal. Not enlarged, not tender and no nodules present. Rectum: No mass, tenderness, anal fissure, external hemorrhoid or internal hemorrhoid. Normal anal tone. Musculoskeletal: Normal range of motion. Right wrist: Normal.      Left wrist: Normal.      Right ankle: Normal.      Left ankle: Normal.      Right lower leg: No edema. Left lower leg: No edema. Lymphadenopathy:      Head:      Right side of head: No submandibular adenopathy. Left side of head: No submandibular adenopathy. Cervical: No cervical adenopathy. Right cervical: No superficial, deep or posterior cervical adenopathy. Left cervical: No superficial, deep or posterior cervical adenopathy. Upper Body:      Right upper body: No supraclavicular adenopathy. Left upper body: No supraclavicular adenopathy. Lower Body: No right inguinal adenopathy. No left inguinal adenopathy. Skin:     General: Skin is warm and dry. Capillary Refill: Capillary refill takes less than 2 seconds. Coloration: Skin is not cyanotic. Findings: No rash. Nails: There is no clubbing. Neurological:      Mental Status: He is alert and oriented to person, place, and time. Cranial Nerves: No cranial nerve deficit, dysarthria or facial asymmetry. Sensory: Sensation is intact. Motor: Motor function is intact. No weakness, tremor, atrophy or abnormal muscle tone. Coordination: Coordination is intact. Romberg sign negative. Coordination normal.      Gait: Gait is intact. Deep Tendon Reflexes: Reflexes are normal and symmetric. Reflex Scores:       Brachioradialis reflexes are 2+ on the right side and 2+ on the left side. Patellar reflexes are 2+ on the right side and 2+ on the left side.      Comments: CN II-XII grossly intact, speech clear, MAEW, no focal deficits   Psychiatric:         Attention and Perception: Attention and perception normal.         Mood and Affect: Mood and affect normal.         Speech: Speech normal.         Behavior: Behavior normal. Behavior is cooperative. Thought Content: Thought content normal.         Cognition and Memory: Cognition and memory normal.         Judgment: Judgment normal.         Lab Results   Component Value Date    WBC 6.8 03/11/2021    HGB 16.5 03/11/2021    HCT 49.6 03/11/2021    MCV 95.4 (H) 03/11/2021     03/11/2021    LYMPHOPCT 17.8 (L) 03/11/2021    RBC 5.20 03/11/2021    MCH 31.7 (H) 03/11/2021    MCHC 33.3 03/11/2021    RDW 13.0 03/11/2021     Lab Results   Component Value Date     03/11/2021    K 4.3 03/11/2021     03/11/2021    CO2 25 03/11/2021    BUN 17 03/11/2021    CREATININE 0.9 03/11/2021    GLUCOSE 98 03/11/2021    CALCIUM 9.5 03/11/2021    PROT 7.7 03/11/2021    LABALBU 4.5 03/11/2021    BILITOT 0.8 03/11/2021    ALKPHOS 84 03/11/2021    AST 17 03/11/2021    ALT 14 03/11/2021    LABGLOM >60 03/11/2021    GFRAA >59 03/11/2021       No results found for: TSH, O7YXOOF, M1NBPFM, THYROIDAB, FT3, T4FREE  Lab Results   Component Value Date    CHOL 167 03/11/2021     Lab Results   Component Value Date    TRIG 67 03/11/2021     Lab Results   Component Value Date    HDL 62 03/11/2021    HDL 61 07/02/2019     Lab Results   Component Value Date    LDLCALC 92 03/11/2021    [address] 07/02/2019     Lab Results   Component Value Date    PSA 0.57 03/11/2021    PSA 0.35 07/02/2019         No results found for this visit on 03/30/21. Assessment:    ICD-10-CM    1. COPD without exacerbation ([facility] [address]  J44.9    2. Screening for deficiency anemia  Z13.0 CBC Auto Differential   3. Screening for hyperlipidemia  Z13.220 Comprehensive Metabolic Panel     Lipid Panel   4. Screening for prostate cancer  Z12.5 PSA screening   5. Encounter for hepatitis C screening test for low risk patient  Z11.59 Hepatitis C Antibody   6.  Lung cancer screening declined by patient  Z53.20        Plan:  Chidi Barnes was seen today for annual exam.    Diagnoses and all orders for this visit:    COPD without exacerbation ([facility] [address]    Screening for deficiency anemia  -     CBC Auto Differential; Future    Screening for hyperlipidemia  -     Comprehensive Metabolic Panel; Future  -     Lipid Panel; Future    Screening for prostate cancer  -     PSA screening; Future    Encounter for hepatitis C screening test for low risk patient  -     Hepatitis C Antibody; Future    Lung cancer screening declined by patient    1. Labs reviewed with patient which were normal.   2. Prostate exam normal today with PSA also normal.   3. Counseled on smoking cessation x3-5 min, patient not ready to quit but will continue to work on cutting back. Patient declines lung cancer screening but may agree to this again at a later date. 4. Patient declines vaccines but plans on getting the Grayce Jorge and Grayce Jorge Covid-19 vaccine when available. 5. COPD-asymptomatic with no exacerbations in the past year. Encouraged smoking cessation. 6.  Return in about 1 year (around 3/30/2022) for medicare wellness. Over 50% of the total visit time of 20 minutes was spent on counseling and/or coordination of care of:   1. COPD without exacerbation ([facility] [address]    2. Screening for deficiency anemia    3. Screening for hyperlipidemia    4. Screening for prostate cancer    5. Encounter for hepatitis C screening test for low risk patient    6.  Lung cancer screening declined by patient         Orders Placed This Encounter   Procedures    CBC Auto Differential     Standing Status:   Future     Standing Expiration Date:   4/30/2022    Comprehensive Metabolic Panel     Standing Status:   Future     Standing Expiration Date:   4/30/2022    PSA screening     Standing Status:   Future     Standing Expiration Date:   4/30/2022    Hepatitis C Antibody     Standing Status:   Future     Standing Expiration Date:   4/30/2022    Lipid Panel     Standing Status:   Future     Standing Expiration Date:   3/30/2022 Order Specific Question:   Is Patient Fasting?/# of Hours     Answer:   yes/8 hrs     No orders of the defined types were placed in this encounter. There are no discontinued medications. Patient Instructions     Patient Education        Learning About COPD and How to Prevent Lung Infections  How do lung infections affect COPD? Lung infections like pneumonia and acute bronchitis are common causes of COPD flare-ups. And people who have COPD are more likely to get these lung infections, especially if they smoke. When you have COPD, it is important to know the symptoms of pneumonia and acute bronchitis and call your doctor if you have them. Symptoms include:  · A cough that brings up more mucus than usual.  · Fever. · Shortness of breath. What can you do to prevent these infections? Stay healthy   · Get the flu vaccine every year. · Get a pneumococcal vaccine shot. If you have had one before, ask your doctor whether you need another dose. Two different types of pneumococcal vaccines are recommended for people ages 72 and older. · If you must be around people with colds or the flu, wash your hands often. · Do not smoke. This is the most important step you can take to prevent more damage to your lungs. If you need help quitting, talk to your doctor about stop-smoking programs and medicines. These can increase your chances of quitting for good. · Avoid secondhand smoke, air pollution, and high altitudes. Also avoid cold, dry air and hot, humid air. Stay at home with your windows closed when air pollution is bad. Exercise and eat well   · If your doctor recommends it, get more exercise. Walking is a good choice. Bit by bit, increase the amount you walk every day. Try for at least 30 minutes on most days of the week. · Eat regular, well-balanced meals. Eating right keeps your energy levels up and helps your body fight infection. · Get plenty of rest and sleep.   Follow-up care is a key part of your treatment and safety. Be sure to make and go to all appointments, and call your doctor if you are having problems. It's also a good idea to know your test results and keep a list of the medicines you take. Where can you learn more? Go to https://nahid.inTarvo. org and sign in to your [employer] account. Enter G382 in the [employer] box to learn more about \"Learning About COPD and How to Prevent Lung Infections. \"     If you do not have an account, please click on the \"Sign Up Now\" link. Current as of: October 26, 2020               Content Version: 12.8  © 6622-8490 [facility]. Care instructions adapted under license by [facility] ([facility]). If you have questions about a medical condition or this instruction, always ask your healthcare professional. [address] any warranty or liability for your use of this information. Patient Education        Learning About Lung Cancer Screening  What is screening for lung cancer? Lung cancer screening is a way to find some lung cancers early, before a person has any symptoms of the cancer. Lung cancer screening may help those who have the highest risk for lung cancer--people 50 to 55 and older who are or were heavy smokers. For most people, who aren't at increased risk, screening for lung cancer probably isn't helpful. Screening won't prevent cancer. And it may not find all lung cancers. Lung cancer screening may lower the risk of dying from lung cancer in a small number of people. How is it done? Lung cancer screening is done with a low-dose CT (computed tomography) scan. A CT scan uses X-rays, or radiation, to make detailed pictures of your body. Experts recommend that screening be done in medical centers that focus on finding and treating lung cancer. Who is screening recommended for? Lung cancer screening is recommended for people who are or were heavy smokers.  That means people with a smoking history of at least 20 or 30 pack years. A pack year is a way to measure how heavy a smoker you are or were. To figure out your pack years, multiply how many packs a day on average (assuming 20 cigarettes per pack) you have smoked by how many years you have smoked. For example:  · If you smoked 1 pack a day for 15 years, that's 1 times 15. So you have a smoking history of 15 pack years. · If you smoked 1 pack a day for 20 years, that's 1 times 20. So you have a smoking history of 20 pack years. · If you smoked 2 packs a day for 15 years, that's 2 times 15. So you have a smoking history of 30 pack years. Experts agree that screening is for people who have a high risk of lung cancer. But experts don't agree on what high risk means. Some say people age 48 or older with at least a 20-pack-year smoking history are high risk. Others say it's people age 54 or older with a 30-pack-year history. Experts may also have other rules about who should be screened. For example, here is the guideline from the U.S. Preventive Services Task Force, which recommends lung cancer screening if:  · You are 54to [de-identified]years old. · And you have a smoking history of at least 30 pack years. · And you still smoke, or you quit within the last 15 years. · And you are not in poor health overall. (Having a serious health problem might mean that you couldn't have treatment for lung cancer. The treatment could be too high-risk, and it might not help you live longer.)  What are the risks of screening? CT screening for lung cancer isn't perfect. It can show an abnormal result when it turns out there wasn't any cancer. This is called a false-positive result. This means you may need more tests to make sure you don't have cancer. These tests can be harmful and cause a lot of worry. These tests may include more CT scans and invasive testing like a lung biopsy.  In a biopsy, the doctor takes a sample of tissue from inside your lung so it can be looked at under a microscope. A biopsy is the only way to tell if you have lung cancer. If the biopsy finds cancer, you and your doctor will have to decide how or whether to treat it. Some lung cancers found on CT scans are harmless and would not have caused a problem if they had not been found through screening. But because doctors can't tell which ones will turn out to be harmless, most will be treated. This means that you may get treatment--including surgery, radiation, or chemotherapy--that you don't need. There is a risk of damage to cells or tissue from being exposed to radiation, including the small amounts used in CTs, X-rays, and other medical tests. Over time, exposure to radiation may cause cancer and other health problems. But in most cases, the risk of getting cancer from being exposed to small amounts of radiation is low. It's not a reason to avoid these tests for most people. What are the benefits of screening? Your scan may be normal (negative). For some people who are at higher risk, screening lowers the chance of dying of lung cancer. How much and how long you smoked helps to determine your risk level. Screening can find some cancers early, when treatment may be more likely to work. What happens after screening? The results of your CT scan will be sent to your doctor. Someone from your care team will explain the results of your scan and answer any questions you may have. If you need any follow-up, he or she will help you understand what to do next. After a lung cancer screening, you can go back to your usual activities right away. A lung cancer screening test can't tell if you have lung cancer. If your results are positive, your doctor can't tell whether an abnormal finding is a harmless nodule, cancer, or something else without doing more tests. What can you do to help prevent lung cancer? Some lung cancers can't be prevented.  But if you smoke, quitting smoking is the best step you can take to prevent lung cancer. If you want to quit, your doctor can recommend medicines or other ways to help. Follow-up care is a key part of your treatment and safety. Be sure to make and go to all appointments, and call your doctor if you are having problems. It's also a good idea to know your test results and keep a list of the medicines you take. Where can you learn more? Go to https://Enevopeshoutr.Cirqle.nl. org and sign in to your [employer] account. Enter U748 in the [employer] box to learn more about \"Learning About Lung Cancer Screening. \"     If you do not have an account, please click on the \"Sign Up Now\" link. Current as of: December 17, 2020               Content Version: 12.8  © 2006-2021 [employer]. Care instructions adapted under license by [facility] ([facility]). If you have questions about a medical condition or this instruction, always ask your healthcare professional. [place] [address] any warranty or liability for your use of this information. Patient Education        Stopping Smoking: Care Instructions  Your Care Instructions     Cigarette smokers crave the nicotine in cigarettes. Giving it up is much harder than simply changing a habit. Your body has to stop craving the nicotine. It is hard to quit, but you can do it. There are many tools that people use to quit smoking. You may find that combining tools works best for you. There are several steps to quitting. First you get ready to quit. Then you get support to help you. After that, you learn new skills and behaviors to become a nonsmoker. For many people, a necessary step is getting and using medicine. Your doctor will help you set up the plan that best meets your needs. You may want to attend a smoking cessation program to help you quit smoking. When you choose a program, look for one that has proven success. Ask your doctor for ideas.  You will greatly increase your chances of success if you take medicine as well as get counseling or join a cessation program.  Some of the changes you feel when you first quit tobacco are uncomfortable. Your body will miss the nicotine at first, and you may feel short-tempered and grumpy. You may have trouble sleeping or concentrating. Medicine can help you deal with these symptoms. You may struggle with changing your smoking habits and rituals. The last step is the tricky one: Be prepared for the smoking urge to continue for a time. This is a lot to deal with, but keep at it. You will feel better. Follow-up care is a key part of your treatment and safety. Be sure to make and go to all appointments, and call your doctor if you are having problems. It's also a good idea to know your test results and keep a list of the medicines you take. How can you care for yourself at home? · Ask your family, friends, and coworkers for support. You have a better chance of quitting if you have help and support. · Join a support group, such as Nicotine Anonymous, for people who are trying to quit smoking. · Consider signing up for a smoking cessation program, such as the American Lung Association's Freedom from Smoking program.  · Get text messaging support. Go to the website at www.smokefree. gov to sign up for the  program.  · Set a quit date. Pick your date carefully so that it is not right in the middle of a big deadline or stressful time. Once you quit, do not even take a puff. Get rid of all ashtrays and lighters after your last cigarette. Clean your house and your clothes so that they do not smell of smoke. · Learn how to be a nonsmoker. Think about ways you can avoid those things that make you reach for a cigarette. ? Avoid situations that put you at greatest risk for smoking. For some people, it is hard to have a drink with friends without smoking. For others, they might skip a coffee break with coworkers who smoke. ? Change your daily routine.  Take a different route to work or eat a meal in a different place. · Cut down on stress. Calm yourself or release tension by doing an activity you enjoy, such as reading a book, taking a hot bath, or gardening. · Talk to your doctor or pharmacist about nicotine replacement therapy, which replaces the nicotine in your body. You still get nicotine but you do not use tobacco. Nicotine replacement products help you slowly reduce the amount of nicotine you need. These products come in several forms, many of them available over-the-counter:  ? Nicotine patches  ? Nicotine gum and lozenges  ? Nicotine inhaler  · Ask your doctor about bupropion (Wellbutrin) or varenicline (Chantix), which are prescription medicines. They do not contain nicotine. They help you by reducing withdrawal symptoms, such as stress and anxiety. · Some people find hypnosis, acupuncture, and massage helpful for ending the smoking habit. · Eat a healthy diet and get regular exercise. Having healthy habits will help your body move past its craving for nicotine. · Be prepared to keep trying. Most people are not successful the first few times they try to quit. Do not get mad at yourself if you smoke again. Make a list of things you learned and think about when you want to try again, such as next week, next month, or next year. Where can you learn more? Go to [email or web address] org and sign in to your [employer] account. Enter O878 in the [facility] box to learn more about \"Stopping Smoking: Care Instructions. \"     If you do not have an account, please click on the \"Sign Up Now\" link. Current as of: March 12, 2020               Content Version: 12.8  © 6261-9402 Healthwise, Incorporated. Care instructions adapted under license by [facility] ([facility]). If you have questions about a medical condition or this instruction, always ask your healthcare professional. [address] any warranty or liability for your use of this information. Patient voices understanding and agrees to plans along with risks and benefits of plan. Counseling:  Jesus Manuel Santa Shay's case, medications and options were discussed in detail. patient was instructed to call the office if he   questions regarding him treatment. Should him conditions worsen, he should return to office to be reassessed by Dr. Jesus Manuel Santa. he  Should to go the closest Emergency Department for any emergency. They verbalized understanding the above instructions.

## 2021-06-29 ENCOUNTER — TELEPHONE (OUTPATIENT)
Dept: FAMILY MEDICINE CLINIC | Age: 71
End: 2021-06-29

## 2021-06-29 NOTE — TELEPHONE ENCOUNTER
The patient is asking for a refill of dicofenac sodium 1% Gel sent to [facility] in [place]. It wouldn't let me pend the medication.

## 2021-06-30 DIAGNOSIS — G89.29 CHRONIC LEFT-SIDED LOW BACK PAIN WITH LEFT-SIDED SCIATICA: Primary | ICD-10-CM

## 2021-06-30 DIAGNOSIS — M54.42 CHRONIC LEFT-SIDED LOW BACK PAIN WITH LEFT-SIDED SCIATICA: Primary | ICD-10-CM

## 2022-03-30 ENCOUNTER — HOSPITAL ENCOUNTER (OUTPATIENT)
Dept: GENERAL RADIOLOGY | Age: 72
Discharge: HOME | End: 2022-03-30
Payer: MEDICARE

## 2022-03-30 ENCOUNTER — OFFICE VISIT (OUTPATIENT)
Dept: FAMILY MEDICINE CLINIC | Age: 72
End: 2022-03-30
Payer: MEDICARE

## 2022-03-30 VITALS
HEIGHT: 75 IN | WEIGHT: 192.13 LBS | SYSTOLIC BLOOD PRESSURE: 120 MMHG | BODY MASS INDEX: 23.89 KG/M2 | DIASTOLIC BLOOD PRESSURE: 70 MMHG | OXYGEN SATURATION: 97 % | HEART RATE: 70 BPM | TEMPERATURE: 97.8 F

## 2022-03-30 DIAGNOSIS — Z87.442 HISTORY OF KIDNEY STONES: ICD-10-CM

## 2022-03-30 DIAGNOSIS — J44.9 CHRONIC OBSTRUCTIVE PULMONARY DISEASE, UNSPECIFIED COPD TYPE (HCC): ICD-10-CM

## 2022-03-30 DIAGNOSIS — R31.9 HEMATURIA, UNSPECIFIED TYPE: Primary | ICD-10-CM

## 2022-03-30 DIAGNOSIS — S32.020D COMPRESSION FRACTURE OF L2 VERTEBRA WITH ROUTINE HEALING: ICD-10-CM

## 2022-03-30 DIAGNOSIS — Z00.00 MEDICARE ANNUAL WELLNESS VISIT, SUBSEQUENT: Primary | ICD-10-CM

## 2022-03-30 DIAGNOSIS — R31.9 HEMATURIA, UNSPECIFIED TYPE: ICD-10-CM

## 2022-03-30 DIAGNOSIS — R30.0 DYSURIA: ICD-10-CM

## 2022-03-30 DIAGNOSIS — Z11.59 ENCOUNTER FOR HEPATITIS C SCREENING TEST FOR LOW RISK PATIENT: ICD-10-CM

## 2022-03-30 DIAGNOSIS — S32.010D COMPRESSION FRACTURE OF L1 VERTEBRA WITH ROUTINE HEALING: ICD-10-CM

## 2022-03-30 DIAGNOSIS — Z87.891 HISTORY OF TOBACCO ABUSE: ICD-10-CM

## 2022-03-30 DIAGNOSIS — Z13.6 SCREENING FOR AAA (ABDOMINAL AORTIC ANEURYSM): ICD-10-CM

## 2022-03-30 DIAGNOSIS — Z12.5 SCREENING FOR PROSTATE CANCER: ICD-10-CM

## 2022-03-30 DIAGNOSIS — Z13.220 SCREENING FOR HYPERLIPIDEMIA: ICD-10-CM

## 2022-03-30 PROBLEM — F41.9 ANXIETY: Status: RESOLVED | Noted: 2019-12-06 | Resolved: 2022-03-30

## 2022-03-30 LAB
APPEARANCE FLUID: NORMAL
BACTERIA: ABNORMAL /HPF
BILIRUBIN URINE: NEGATIVE
BILIRUBIN, POC: NORMAL
BLOOD URINE, POC: NORMAL
BLOOD, URINE: NEGATIVE
CLARITY, POC: NORMAL
CLARITY: CLEAR
COLOR, POC: NORMAL
COLOR: YELLOW
CRYSTALS, UA: ABNORMAL /HPF
EPITHELIAL CELLS, UA: 1 /HPF (ref 0–5)
GLUCOSE URINE, POC: NORMAL
GLUCOSE URINE: NEGATIVE MG/DL
HYALINE CASTS: 3 /HPF (ref 0–8)
KETONES, POC: NORMAL
KETONES, URINE: NEGATIVE MG/DL
LEUKOCYTE EST, POC: NORMAL
LEUKOCYTE ESTERASE, URINE: ABNORMAL
NITRITE, POC: NORMAL
NITRITE, URINE: POSITIVE
PH UA: 6.5 (ref 5–8)
PH, POC: 7
PROTEIN UA: NEGATIVE MG/DL
PROTEIN, POC: 100
RBC UA: 1 /HPF (ref 0–4)
SPECIFIC GRAVITY UA: 1.02 (ref 1–1.03)
SPECIFIC GRAVITY, POC: 1.02
UROBILINOGEN, POC: 0.2
UROBILINOGEN, URINE: 0.2 E.U./DL
WBC UA: 13 /HPF (ref 0–5)

## 2022-03-30 PROCEDURE — 74018 RADEX ABDOMEN 1 VIEW: CPT

## 2022-03-30 PROCEDURE — 99214 OFFICE O/P EST MOD 30 MIN: CPT | Performed by: INTERNAL MEDICINE

## 2022-03-30 PROCEDURE — G0439 PPPS, SUBSEQ VISIT: HCPCS | Performed by: INTERNAL MEDICINE

## 2022-03-30 PROCEDURE — 81002 URINALYSIS NONAUTO W/O SCOPE: CPT | Performed by: INTERNAL MEDICINE

## 2022-03-30 RX ORDER — CIPROFLOXACIN 250 MG/1
250 TABLET, FILM COATED ORAL 2 TIMES DAILY
Qty: 20 TABLET | Refills: 0 | Status: SHIPPED
Start: 2022-03-30 | End: 2022-04-01 | Stop reason: ALTCHOICE

## 2022-03-30 RX ORDER — PHENAZOPYRIDINE HYDROCHLORIDE 200 MG/1
200 TABLET, FILM COATED ORAL 3 TIMES DAILY PRN
Qty: 9 TABLET | Refills: 0 | Status: SHIPPED | OUTPATIENT
Start: 2022-03-30 | End: 2022-04-02

## 2022-03-30 ASSESSMENT — PATIENT HEALTH QUESTIONNAIRE - PHQ9
1. LITTLE INTEREST OR PLEASURE IN DOING THINGS: 0
SUM OF ALL RESPONSES TO PHQ QUESTIONS 1-9: 0
SUM OF ALL RESPONSES TO PHQ QUESTIONS 1-9: 0
SUM OF ALL RESPONSES TO PHQ9 QUESTIONS 1 & 2: 0
SUM OF ALL RESPONSES TO PHQ QUESTIONS 1-9: 0
SUM OF ALL RESPONSES TO PHQ QUESTIONS 1-9: 0
2. FEELING DOWN, DEPRESSED OR HOPELESS: 0

## 2022-03-30 NOTE — PROGRESS NOTES
Medicare Annual Wellness Visit    Taylor Mcgowan is here for Medicare AWV    Assessment & Plan   Medicare annual wellness visit, subsequent  Hematuria, unspecified type  Comments:  Possible UTI on UA today. No signs of kidney stones on KUB. Empiric antibx started and will recheck UA for hematuria after tx. Orders:  -     POCT Urinalysis no Micro  -     phenazopyridine (PYRIDIUM) 200 MG tablet; Take 1 tablet by mouth 3 times daily as needed for Pain, Disp-9 tablet, R-0Normal  -     XR ABDOMEN (KUB) (SINGLE AP VIEW); Future  -     CBC with Auto Differential; Future  -     Comprehensive Metabolic Panel; Future  Dysuria  -     phenazopyridine (PYRIDIUM) 200 MG tablet; Take 1 tablet by mouth 3 times daily as needed for Pain, Disp-9 tablet, R-0Normal  History of kidney stones  -     XR ABDOMEN (KUB) (SINGLE AP VIEW); Future  History of tobacco abuse  Screening for AAA (abdominal aortic aneurysm)  -     [place] screening for AAA; Future  Screening for prostate cancer  -     PSA Screening; Future  Compression fracture of L1 vertebra with routine healing  Comments:  Incidental finding on KUB. Schedule DEXA to evaluate for osteopenia/osteoporosis. Compression fracture of L2 vertebra with routine healing  Comments:  Incidental finding on KUB. Schedule DEXA to evaluate for osteopenia/osteoporosis. Chronic obstructive pulmonary disease, unspecified COPD type ([address]  Comments:  Patient denies symptoms currently. Counseled on need for tobacco cessation. Recommendations for Preventive Services Due: see orders and patient instructions/AVS.  Recommended screening schedule for the next 5-10 years is provided to the patient in written form: see Patient Instructions/AVS.     Return in 1 year (on 3/30/2023) for Medicare Annual Wellness Visit in 1 year, medicare wellness.      Subjective   The following acute and/or chronic problems were also addressed today:  Patient c/o burning with urination and gross hematuria for the past 5 days with no fever but he has had some bilateral lower back pain and suprapubic pain also. Symptoms improved some yesterday but he passed a small blood clot last night. He has remote history of kidney stones. He has had increased frequency of urination. Lab Results   Component Value Date    COLORU YELLOW 04/19/2022    NITRU Negative 04/19/2022    GLUCOSEU Negative 04/19/2022    KETUA Negative 04/19/2022    UROBILINOGEN 0.2 04/19/2022    BILIRUBINUR Negative 04/19/2022    BILIRUBINUR Neg 03/30/2022   Positive Nitrite  Small LE  Bacteria 4+  White blood cell count 13  Red blood cell count 1  Epithelial cells 1  Urine cx and screen-pending    Narrative   XR ABDOMEN (KUB) (SINGLE AP VIEW) 3/30/2022 12:40 PM   HISTORY: R31.9   COMPARISON: None   FINDINGS:   There is a nonobstructive bowel gas pattern. There is scattered   atheromatous calcifications. No definite urolithiasis. Evaluation for   free intraperitoneal air is limited on a supine radiograph, but there   are no definite findings of free intraperitoneal air. Age   indeterminate L1 and L2 compression deformities.        Impression   1. Age-indeterminate L1 and L2 compression deformities. Recommend   correlation for acute low back pain. 2. There are scattered atheromatous calcifications of the upper   abdomen although no definite urolithiasis is seen. Signed by Dr Kalli Mendez               Patient's complete Health Risk Assessment and screening values have been reviewed and are found in Flowsheets. The following problems were reviewed today and where indicated follow up appointments were made and/or referrals ordered.     Positive Risk Factor Screenings with Interventions:         Tobacco Use:     Tobacco Use: High Risk    Smoking Tobacco Use: Current Every Day Smoker    Smokeless Tobacco Use: Never Used     E-Cigarettes/Vaping Use     Questions Responses    E-Cigarette/Vaping Use     Start Date     Passive Exposure     Quit Date     Counseling Given Comments         Substance Abuse - Tobacco Interventions:  tobacco cessation tips and resources provided, patient agrees to try the following tobacco cessation strategies:  patient has cut back to <1 ppd of smoking but nicotine patches did not help in the past and chantix did not help           General Health and ACP:  General  In general, how would you say your health is?: Good  In the past 7 days, have you experienced any of the following: New or Increased Pain, New or Increased Fatigue, Loneliness, Social Isolation, Stress or Anger?: No  Do you get the social and emotional support that you need?: Yes  Do you have a Living Will?: (!) No    Advance Directives     Power of  Living Will ACP-Advance Directive ACP-Power of     Not on File Not on File Not on File Not on File      General Health Risk Interventions:  · No Living Will: [address] addressed with patient today    Health Habits/Nutrition:     Physical Activity: Sufficiently Active    Days of Exercise per Week: 7 days    Minutes of Exercise per Session: 60 min     Have you lost any weight without trying in the past 3 months?: (!) Yes  Body mass index: 24.01  Have you seen the dentist within the past year?: (!) No    Health Habits/Nutrition Interventions:  · Dental exam overdue:  patient encouraged to make appointment with his/her dentist     Safety:  Do you have working smoke detectors?: Yes  Do you have any tripping hazards - loose or unsecured carpets or rugs?: No  Do you have any tripping hazards - clutter in doorways, halls, or stairs?: No  Do you have either shower bars, grab bars, non-slip mats or non-slip surfaces in your shower or bathtub?: (!) No  Do all of your stairways have a railing or banister?: Yes  Do you always fasten your seatbelt when you are in a car?: Yes    Safety Interventions:  · Home safety tips provided           Objective   Vitals:    03/30/22 1124   BP: 120/70   Pulse: 70   Temp: 97.8 °F (36.6 °C) SpO2: 97%   Weight: 192 lb 2 oz (87.1 kg)   Height: 6' 3\" (1.905 m)      Body mass index is 24.01 kg/m². General Appearance: alert and oriented to person, place and time, well developed and well- nourished, in no acute distress  Skin: warm and dry, no rash or erythema  Head: normocephalic and atraumatic  Eyes: pupils equal, round, and reactive to light, extraocular eye movements intact, conjunctivae normal  ENT: tympanic membrane, external ear and ear canal normal bilaterally, nose without deformity, nasal mucosa and turbinates normal without polyps  Neck: supple and non-tender without mass, no thyromegaly or thyroid nodules, no cervical lymphadenopathy  Pulmonary/Chest: clear to auscultation bilaterally- no wheezes, rales or rhonchi, normal air movement, no respiratory distress  Cardiovascular: normal rate, regular rhythm, normal S1 and S2, no murmurs, rubs, clicks, or gallops, distal pulses intact, no carotid bruits  Abdomen: soft, non-distended, normal bowel sounds, no masses or organomegaly. +RLQ TTP and small, easily reducible umbilical hernia. Extremities: no cyanosis, clubbing or edema  Musculoskeletal: normal range of motion, no joint swelling, or deformity.  +bilateral CVA tenderness  Neurologic: reflexes normal and symmetric, no cranial nerve deficit, gait, coordination and speech normal   : prostate exam deferred today due to UTI and discomfort    Lab Results   Component Value Date    COLORU YELLOW 04/19/2022    NITRU Negative 04/19/2022    GLUCOSEU Negative 04/19/2022    KETUA Negative 04/19/2022    UROBILINOGEN 0.2 04/19/2022    BILIRUBINUR Negative 04/19/2022    BILIRUBINUR Neg 03/30/2022   Large blood  100 protein  Small leukocytes      No Known Allergies  Prior to Visit Medications    Not on File       CareTeam (Including outside providers/suppliers regularly involved in providing care):   Patient Care Team:  Kirk Larose MD as PCP - General (Pediatrics)  Kirk Larose MD as PCP - [facility] Empaneled Provider    Reviewed and updated this visit:  Tobacco  Allergies  Meds  Problems  Med Hx  Surg Hx  Soc Hx  Fam Hx                  Tobacco Cessation Counseling: Patient advised about behavior change, including information about personal health harms, usage of appropriate cessation measures and benefits of cessation.   Time spent (minutes): 5-7 min

## 2022-03-30 NOTE — PATIENT INSTRUCTIONS
Advance Directives: Care Instructions  Overview  An advance directive is a legal way to state your wishes at the end of your life. It tells your family and your doctor what to do if you can't say what youwant. There are two main types of advance directives. You can change them any timeyour wishes change. Living will. This form tells your family and your doctor your wishes about life support and other treatment. The form is also called a declaration. Medical power of . This form lets you name a person to make treatment decisions for you when you can't speak for yourself. This person is called a health care agent (health care proxy, health care surrogate). The form is also called a durable power of  for health care. If you do not have an advance directive, decisions about your medical care maybe made by a family member, or by a doctor or a  who doesn't know you. It may help to think of an advance directive as a gift to the people who carefor you. If you have one, they won't have to make tough decisions by themselves. Follow-up care is a key part of your treatment and safety. Be sure to make and go to all appointments, and call your doctor if you are having problems. It's also a good idea to know your test results and keep alist of the medicines you take. What should you include in an advance directive? Many states have a unique advance directive form. (It may ask you to address specific issues.) Or you might use a universal form that's approved by manystates. If your form doesn't tell you what to address, it may be hard to know what to include in your advance directive. Use the questions below to help you getstarted.  Who do you want to make decisions about your medical care if you are not able to?  What life-support measures do you want if you have a serious illness that gets worse over time or can't be cured?  What are you most afraid of that might happen?  (Maybe you're afraid of having pain, losing your independence, or being kept alive by machines.)   Where would you prefer to die? (Your home? A hospital? A nursing home?)   Do you want to donate your organs when you die?  Do you want certain Restoration practices performed before you die? When should you call for help? Be sure to contact your doctor if you have any questions. Where can you learn more? Go to [email or web address] and sign in to your [employer] account. Enter R264 in the [employer] box to learn more about \"Advance Directives: Care Instructions. \"     If you do not have an account, please click on the \"Sign Up Now\" link. Current as of: October 18, 2021               Content Version: 13.2  © 2006-2022 [employer]. Care instructions adapted under license by [facility] [address] If you have questions about a medical condition or this instruction, always ask your healthcare professional. [place] [address] any warranty or liability for your use of this information. Stopping Smoking: Care Instructions  Your Care Instructions     Cigarette smokers crave the nicotine in cigarettes. Giving it up is much harder than simply changing a habit. Your body has to stop craving the nicotine. It is hard to quit, but you can do it. There are many tools that people use to quitsmoking. You may find that combining tools works best for you. There are several steps to quitting. First you get ready to quit. Then you get support to help you. After that, you learn new skills and behaviors to become anonsmoker. For many people, a necessary step is getting and using medicine. Your doctor will help you set up the plan that best meets your needs. You may want to attend a smoking cessation program to help you quit smoking. When you choose a program, look for one that has proven success. Ask your doctor for ideas.  You will greatly increase your chances of success if you take medicineas well as get counseling or join a cessation program.  Some of the changes you feel when you first quit tobacco are uncomfortable. Your body will miss the nicotine at first, and you may feel short-tempered and grumpy. You may have trouble sleeping or concentrating. Medicine can help you deal with these symptoms. You may struggle with changing your smoking habits and rituals. The last step is the tricky one: Be prepared for the smoking urge to continue for a time. This is a lot to deal with, but keep at it. You willfeel better. Follow-up care is a key part of your treatment and safety. Be sure to make and go to all appointments, and call your doctor if you are having problems. It's also a good idea to know your test results and keep alist of the medicines you take. How can you care for yourself at home?  Ask your family, friends, and coworkers for support. You have a better chance of quitting if you have help and support.  Join a support group, such as Nicotine Anonymous, for people who are trying to quit smoking.  Consider signing up for a smoking cessation program, such as the American Lung Association's Freedom from Smoking program.   Get text messaging support. Go to the website at www.smokefree. gov to sign up for the [facility] program.   Set a quit date. Pick your date carefully so that it is not right in the middle of a big deadline or stressful time. Once you quit, do not even take a puff. Get rid of all ashtrays and lighters after your last cigarette. Clean your house and your clothes so that they do not smell of smoke.  Learn how to be a nonsmoker. Think about ways you can avoid those things that make you reach for a cigarette. ? Avoid situations that put you at greatest risk for smoking. For some people, it is hard to have a drink with friends without smoking. For others, they might skip a coffee break with coworkers who smoke. ? Change your daily routine.  Take a different route to

## 2022-03-31 DIAGNOSIS — S32.020A COMPRESSION FRACTURE OF L2 VERTEBRA, INITIAL ENCOUNTER (HCC): ICD-10-CM

## 2022-03-31 DIAGNOSIS — S32.010A COMPRESSION FRACTURE OF L1 VERTEBRA, INITIAL ENCOUNTER (HCC): Primary | ICD-10-CM

## 2022-04-01 DIAGNOSIS — B96.20 E. COLI UTI (URINARY TRACT INFECTION): Primary | ICD-10-CM

## 2022-04-01 DIAGNOSIS — N39.0 E. COLI UTI (URINARY TRACT INFECTION): Primary | ICD-10-CM

## 2022-04-01 LAB
ORGANISM: ABNORMAL
URINE CULTURE, ROUTINE: ABNORMAL
URINE CULTURE, ROUTINE: ABNORMAL

## 2022-04-01 RX ORDER — SULFAMETHOXAZOLE AND TRIMETHOPRIM 800; 160 MG/1; MG/1
1 TABLET ORAL 2 TIMES DAILY
Qty: 20 TABLET | Refills: 0 | Status: SHIPPED | OUTPATIENT
Start: 2022-04-01 | End: 2022-04-11

## 2022-04-18 ENCOUNTER — HOSPITAL ENCOUNTER (OUTPATIENT)
Dept: ULTRASOUND IMAGING | Age: 72
Discharge: HOME | End: 2022-04-18
Payer: MEDICARE

## 2022-04-18 DIAGNOSIS — Z13.6 SCREENING FOR AAA (ABDOMINAL AORTIC ANEURYSM): ICD-10-CM

## 2022-04-18 PROCEDURE — 76706 US ABDL AORTA SCREEN AAA: CPT

## 2022-04-19 DIAGNOSIS — Z12.5 SCREENING FOR PROSTATE CANCER: ICD-10-CM

## 2022-04-19 DIAGNOSIS — R31.9 HEMATURIA, UNSPECIFIED TYPE: ICD-10-CM

## 2022-04-19 LAB
ALBUMIN SERPL-MCNC: 4.7 G/DL (ref 3.5–5.2)
ALP BLD-CCNC: 85 U/L (ref 40–130)
ALT SERPL-CCNC: 11 U/L (ref 5–41)
ANION GAP SERPL CALCULATED.3IONS-SCNC: 14 MMOL/L (ref 7–19)
AST SERPL-CCNC: 16 U/L (ref 5–40)
BACTERIA: NEGATIVE /HPF
BASOPHILS ABSOLUTE: 0.1 K/UL (ref 0–0.2)
BASOPHILS RELATIVE PERCENT: 1.3 % (ref 0–1)
BILIRUB SERPL-MCNC: 1 MG/DL (ref 0.2–1.2)
BILIRUBIN URINE: NEGATIVE
BLOOD, URINE: ABNORMAL
BUN BLDV-MCNC: 18 MG/DL (ref 8–23)
CALCIUM SERPL-MCNC: 9.8 MG/DL (ref 8.8–10.2)
CHLORIDE BLD-SCNC: 104 MMOL/L (ref 98–111)
CHOLESTEROL, TOTAL: 169 MG/DL (ref 160–199)
CLARITY: CLEAR
CO2: 24 MMOL/L (ref 22–29)
COLOR: YELLOW
CREAT SERPL-MCNC: 0.9 MG/DL (ref 0.5–1.2)
CRYSTALS, UA: ABNORMAL /HPF
EOSINOPHILS ABSOLUTE: 0.1 K/UL (ref 0–0.6)
EOSINOPHILS RELATIVE PERCENT: 2.1 % (ref 0–5)
EPITHELIAL CELLS, UA: 4 /HPF (ref 0–5)
GFR AFRICAN AMERICAN: >59
GFR NON-AFRICAN AMERICAN: >60
GLUCOSE BLD-MCNC: 98 MG/DL (ref 74–109)
GLUCOSE URINE: NEGATIVE MG/DL
HCT VFR BLD CALC: 47.4 % (ref 42–52)
HDLC SERPL-MCNC: 68 MG/DL (ref 55–121)
HEMOGLOBIN: 15.6 G/DL (ref 14–18)
HYALINE CASTS: 4 /HPF (ref 0–8)
IMMATURE GRANULOCYTES #: 0 K/UL
KETONES, URINE: NEGATIVE MG/DL
LDL CHOLESTEROL CALCULATED: 88 MG/DL
LEUKOCYTE ESTERASE, URINE: ABNORMAL
LYMPHOCYTES ABSOLUTE: 1.4 K/UL (ref 1.1–4.5)
LYMPHOCYTES RELATIVE PERCENT: 22.1 % (ref 20–40)
MCH RBC QN AUTO: 31.6 PG (ref 27–31)
MCHC RBC AUTO-ENTMCNC: 32.9 G/DL (ref 33–37)
MCV RBC AUTO: 96 FL (ref 80–94)
MONOCYTES ABSOLUTE: 0.7 K/UL (ref 0–0.9)
MONOCYTES RELATIVE PERCENT: 10.9 % (ref 0–10)
NEUTROPHILS ABSOLUTE: 3.9 K/UL (ref 1.5–7.5)
NEUTROPHILS RELATIVE PERCENT: 63.3 % (ref 50–65)
NITRITE, URINE: NEGATIVE
PDW BLD-RTO: 13.2 % (ref 11.5–14.5)
PH UA: 7 (ref 5–8)
PLATELET # BLD: 199 K/UL (ref 130–400)
PMV BLD AUTO: 10.6 FL (ref 9.4–12.4)
POTASSIUM SERPL-SCNC: 4.6 MMOL/L (ref 3.5–5)
PROSTATE SPECIFIC ANTIGEN: 0.43 NG/ML (ref 0–4)
PROTEIN UA: ABNORMAL MG/DL
RBC # BLD: 4.94 M/UL (ref 4.7–6.1)
RBC UA: 88 /HPF (ref 0–4)
SODIUM BLD-SCNC: 142 MMOL/L (ref 136–145)
SPECIFIC GRAVITY UA: 1.01 (ref 1–1.03)
TOTAL PROTEIN: 7.4 G/DL (ref 6.6–8.7)
TRIGL SERPL-MCNC: 66 MG/DL (ref 0–149)
UROBILINOGEN, URINE: 0.2 E.U./DL
WBC # BLD: 6.1 K/UL (ref 4.8–10.8)
WBC UA: 12 /HPF (ref 0–5)

## 2022-04-21 LAB — URINE CULTURE, ROUTINE: NORMAL

## 2022-04-22 DIAGNOSIS — F17.200 SMOKER: ICD-10-CM

## 2022-04-22 DIAGNOSIS — R31.29 MICROSCOPIC HEMATURIA: Primary | ICD-10-CM

## 2022-04-26 PROBLEM — S32.010D COMPRESSION FRACTURE OF L1 VERTEBRA WITH ROUTINE HEALING: Status: ACTIVE | Noted: 2022-04-26

## 2022-04-26 PROBLEM — R19.5 POSITIVE COLORECTAL CANCER SCREENING USING COLOGUARD TEST: Status: RESOLVED | Noted: 2019-10-08 | Resolved: 2022-04-26

## 2022-04-26 PROBLEM — S32.020D COMPRESSION FRACTURE OF L2 VERTEBRA WITH ROUTINE HEALING: Status: ACTIVE | Noted: 2022-04-26

## 2022-04-29 ENCOUNTER — OFFICE VISIT (OUTPATIENT)
Dept: UROLOGY | Age: 72
End: 2022-04-29
Payer: MEDICARE

## 2022-04-29 VITALS
SYSTOLIC BLOOD PRESSURE: 140 MMHG | TEMPERATURE: 98.4 F | BODY MASS INDEX: 24.07 KG/M2 | DIASTOLIC BLOOD PRESSURE: 75 MMHG | WEIGHT: 193.6 LBS | HEIGHT: 75 IN

## 2022-04-29 DIAGNOSIS — R31.0 GROSS HEMATURIA: Primary | ICD-10-CM

## 2022-04-29 LAB
BACTERIA URINE, POC: 0
BILIRUBIN URINE: 0 MG/DL
BLOOD, URINE: POSITIVE
CASTS URINE, POC: 0
CLARITY: CLEAR
COLOR: YELLOW
CRYSTALS URINE, POC: 0
EPI CELLS URINE, POC: 0
GLUCOSE URINE: NORMAL
KETONES, URINE: NEGATIVE
LEUKOCYTE EST, POC: NORMAL
NITRITE, URINE: NEGATIVE
PH UA: 7 (ref 4.5–8)
PROTEIN UA: NEGATIVE
RBC URINE, POC: 26
SPECIFIC GRAVITY UA: 1.01 (ref 1–1.03)
UROBILINOGEN, URINE: NORMAL
WBC URINE, POC: 0
YEAST URINE, POC: 0

## 2022-04-29 PROCEDURE — 81001 URINALYSIS AUTO W/SCOPE: CPT | Performed by: NURSE PRACTITIONER

## 2022-04-29 PROCEDURE — 99204 OFFICE O/P NEW MOD 45 MIN: CPT | Performed by: NURSE PRACTITIONER

## 2022-04-29 ASSESSMENT — ENCOUNTER SYMPTOMS
BACK PAIN: 0
NAUSEA: 0
ABDOMINAL DISTENTION: 0
VOMITING: 0
ABDOMINAL PAIN: 0

## 2022-04-29 NOTE — PROGRESS NOTES
Rica Herrmann is a 67 y.o. male who presents today   Chief Complaint   Patient presents with    New Patient     I am here today for hematuria       Hematuria  Patient complains of gross hematuria. Onset of hematuria was 4 weeks ago and was sudden in onset. There is not a history of nephrolithiasis. There is not a history of urologic trauma. Other urologic symptoms include none. Patient admits to history of tobacco use. Patient denies history of chronic James catheter,  surgeries, occupational exposure, sexually transmitted diseases, trauma and urolithiasis. Prior workup has been UA'S. Patient initially started passing clots he presented to his PCP where urine culture was positive on 3/30/2022 for E. coli. He denies any lower urinary tract symptoms with this UTI. Denies any history of UTIs. Repeat UA and culture were obtained on 4/19/2022, repeat culture was negative, UA revealed 88 RBCs. Denies any history of kidney stones. Denies any further gross hematuria. He does continue to have microscopic hematuria. 40 pack year history of smoking. Previous KUB negative. Denies any family history of prostate cancer. PSA within normal limits. Denies any lower urinary tract symptoms, not on any current prostate medications. Past Medical History:   Diagnosis Date    Nocturia 6/27/2019    Positive colorectal cancer screening using Cologuard test 10/8/2019       Past Surgical History:   Procedure Laterality Date    CATARACT REMOVAL Bilateral     COLONOSCOPY N/A 11/7/2019    Dr DAYANA Eddy-Tubulovillous AP, (-) dysplasia x 1, tubular AP, (-) dysplasia x 4, BCM x 1, 3 yr recall    FRACTURE SURGERY Right     shoulder, ball and joint    HUMERUS FRACTURE SURGERY Right     LEG SURGERY Right     femur fracture    SHOULDER SURGERY Right 1965    shoulder fracture, ORIF, football injury       No current outpatient medications on file. No current facility-administered medications for this visit.        No Known Allergies    Social History     Socioeconomic History    Marital status:      Spouse name: None    Number of children: None    Years of education: None    Highest education level: None   Occupational History    None   Tobacco Use    Smoking status: Current Every Day Smoker     Packs/day: 1.00     Years: 47.00     Pack years: 47.00     Types: Cigarettes    Smokeless tobacco: Never Used   Substance and Sexual Activity    Alcohol use: Yes     Alcohol/week: 14.0 standard drinks     Types: 14 Cans of beer per week     Comment: 2 beer per night    Drug use: Never    Sexual activity: None   Other Topics Concern    None   Social History Narrative    None     Social Determinants of Health     Financial Resource Strain:     Difficulty of Paying Living Expenses: Not on file   Food Insecurity:     Worried About Running Out of Food in the Last Year: Not on file    Silvestre of Food in the Last Year: Not on file   Transportation Needs:     Lack of Transportation (Medical): Not on file    Lack of Transportation (Non-Medical):  Not on file   Physical Activity: Sufficiently Active    Days of Exercise per Week: 7 days    Minutes of Exercise per Session: 60 min   Stress:     Feeling of Stress : Not on file   Social Connections:     Frequency of Communication with Friends and Family: Not on file    Frequency of Social Gatherings with Friends and Family: Not on file    Attends Moravian Services: Not on file    Active Member of Clubs or Organizations: Not on file    Attends Club or Organization Meetings: Not on file    Marital Status: Not on file   Intimate Partner Violence:     Fear of Current or Ex-Partner: Not on file    Emotionally Abused: Not on file    Physically Abused: Not on file    Sexually Abused: Not on file   Housing Stability:     Unable to Pay for Housing in the Last Year: Not on file    Number of [place] in the Last Year: Not on file    Unstable Housing in the Last Year: Not on file       Family History   Problem Relation Age of Onset    Diabetes Mother     Colon Polyps Mother     Lung Cancer Father     No Known Problems Sister     Other Brother         disability with back    No Known Problems Maternal Grandmother     Heart Disease Maternal Grandfather     No Known Problems Paternal Grandmother     No Known Problems Paternal Grandfather     Pancreatic Cancer Daughter     Diabetes type 2  Daughter     Thyroid Disease Daughter     High Cholesterol Daughter     Colon Cancer Neg Hx     Esophageal Cancer Neg Hx     Liver Cancer Neg Hx     Liver Disease Neg Hx     Rectal Cancer Neg Hx     Stomach Cancer Neg Hx        REVIEW OF SYSTEMS:  Review of Systems   Constitutional: Negative for chills and fever. Gastrointestinal: Negative for abdominal distention, abdominal pain, nausea and vomiting. Genitourinary: Positive for hematuria. Negative for difficulty urinating, dysuria, flank pain, frequency and urgency. Musculoskeletal: Negative for back pain and gait problem. Psychiatric/Behavioral: Negative for agitation and confusion. PHYSICAL EXAM:  BP (!) 140/75   Temp 98.4 °F (36.9 °C) (Temporal)   Ht 6' 3\" (1.905 m)   Wt 193 lb 9.6 oz (87.8 kg)   BMI 24.20 kg/m²   Physical Exam  Vitals and nursing note reviewed. Constitutional:       General: He is not in acute distress. Appearance: Normal appearance. He is not ill-appearing. Pulmonary:      Effort: Pulmonary effort is normal. No respiratory distress. Abdominal:      General: There is no distension. Tenderness: There is no abdominal tenderness. There is no right CVA tenderness or left CVA tenderness. Neurological:      Mental Status: He is alert and oriented to person, place, and time. Mental status is at baseline.    Psychiatric:         Mood and Affect: Mood normal.         Behavior: Behavior normal.       DATA:    Results for orders placed or performed in visit on 04/29/22   POCT Urinalysis Dipstick w/ Micro (Auto)   Result Value Ref Range    Color, UA Yellow     Clarity, UA Clear Clear    Glucose, Ur NEG     Bilirubin Urine 0 mg/dL    Ketones, Urine Negative     Specific Gravity, UA 1.015 1.005 - 1.030    Blood, Urine Positive     pH, UA 7 4.5 - 8.0    Protein, UA Negative Negative    Nitrite, Urine Negative     Leukocytes, UA TRACE     Urobilinogen, Urine Normal     rbc urine, poc 26     wbc urine, poc 0     bacteria urine, poc 0     yeast urine, poc 0     casts urine, poc 0     Epi Cells Urine, POC 0     crystals urine, poc 0      Lab Results   Component Value Date    PSA 0.43 04/19/2022    PSA 0.57 03/11/2021    PSA 0.35 07/02/2019       1. Gross hematuria  Recent episode of gross hematuria with clots. He was treated for UTI at that time although he continues to have microscopic hematuria on repeat UA. He does have RBCs on UA in office today. He does have a risk with his history of smoking for the last 40 years. Denies any associated symptoms. We will go ahead and evaluate with a CT urogram within the next month and have patient follow-up for cystoscopy. - CT ABDOMEN PELVIS W WO CONTRAST Additional Contrast? Radiologist Recommendation (Urogram Protocol); Future  - POCT Urinalysis Dipstick w/ Micro (Auto)      Orders Placed This Encounter   Procedures    CT ABDOMEN PELVIS W WO CONTRAST Additional Contrast? Radiologist Recommendation (Urogram Protocol)     Standing Status:   Future     Standing Expiration Date:   4/29/2023     Scheduling Instructions:      CT for Urogram protocol     Order Specific Question:   Additional Contrast?     Answer:   Radiologist Recommendation     Comments:   Urogram Protocol     Order Specific Question:   STAT Creatinine as needed:     Answer:   Yes     Order Specific Question:   Reason for exam:     Answer:   gross hematuria    POCT Urinalysis Dipstick w/ Micro (Auto)        Return for Need CT within the next month.  F/U with Corky Librado cysto next available .    All information inputted into the note by the MA to include chief complaint, past medical history, past surgical history, medications, allergies, social and family history and review of systems has been reviewed and updated as needed by me. EMR Dragon/transcription disclaimer: Much of this documentt is electronic  transcription/translation of spoken language to printed text. The  electronic translation of spoken language may be erroneous, or at times,  nonsensical words or phrases may be inadvertently transcribed.  Although I  have reviewed the document for such errors, some may still exist.

## 2022-05-06 ENCOUNTER — HOSPITAL ENCOUNTER (OUTPATIENT)
Dept: CT IMAGING | Age: 72
Discharge: HOME | End: 2022-05-06
Payer: MEDICARE

## 2022-05-06 ENCOUNTER — TELEPHONE (OUTPATIENT)
Dept: UROLOGY | Age: 72
End: 2022-05-06

## 2022-05-06 DIAGNOSIS — R31.0 GROSS HEMATURIA: ICD-10-CM

## 2022-05-06 PROCEDURE — 74178 CT ABD&PLV WO CNTR FLWD CNTR: CPT

## 2022-05-06 PROCEDURE — 6360000004 HC RX CONTRAST MEDICATION: Performed by: NURSE PRACTITIONER

## 2022-05-06 RX ADMIN — IOPAMIDOL 75 ML: 755 INJECTION, SOLUTION INTRAVENOUS at 13:55

## 2022-05-06 NOTE — TELEPHONE ENCOUNTER
HAVE TRIED TO REACH PT TO LET THEM KNOW SCHEDULED APPT WAS RESCHEDULED SOONER THAN ORIGINAL DATE. PTS ONLY CONTACT LINE HAS HAD BUSY SIGNAL WITH  MULTIPLE ATTEMPTS TO REACH. PT HAS BEEN SCHEDULED FOR 6/16/22 AT 1030 INSTEAD OF 7/21/22.

## 2022-06-16 ENCOUNTER — PROCEDURE VISIT (OUTPATIENT)
Dept: UROLOGY | Age: 72
End: 2022-06-16
Payer: MEDICARE

## 2022-06-16 VITALS — WEIGHT: 188 LBS | HEIGHT: 75 IN | TEMPERATURE: 98.6 F | BODY MASS INDEX: 23.38 KG/M2

## 2022-06-16 DIAGNOSIS — R31.0 GROSS HEMATURIA: Primary | ICD-10-CM

## 2022-06-16 DIAGNOSIS — C67.8 MALIGNANT NEOPLASM OF OVERLAPPING SITES OF BLADDER (HCC): ICD-10-CM

## 2022-06-16 LAB
BACTERIA URINE, POC: 0
BILIRUBIN URINE: 0 MG/DL
BLOOD, URINE: POSITIVE
CASTS URINE, POC: 0
CLARITY: CLEAR
COLOR: YELLOW
CRYSTALS URINE, POC: 0
EPI CELLS URINE, POC: 0
GLUCOSE URINE: ABNORMAL
KETONES, URINE: NEGATIVE
LEUKOCYTE EST, POC: ABNORMAL
NITRITE, URINE: NEGATIVE
PH UA: 6 (ref 4.5–8)
PROTEIN UA: POSITIVE
RBC URINE, POC: ABNORMAL
SPECIFIC GRAVITY UA: 1.03 (ref 1–1.03)
UROBILINOGEN, URINE: NORMAL
WBC URINE, POC: 0
YEAST URINE, POC: 0

## 2022-06-16 PROCEDURE — 1123F ACP DISCUSS/DSCN MKR DOCD: CPT | Performed by: UROLOGY

## 2022-06-16 PROCEDURE — 99214 OFFICE O/P EST MOD 30 MIN: CPT | Performed by: UROLOGY

## 2022-06-16 PROCEDURE — 52000 CYSTOURETHROSCOPY: CPT | Performed by: UROLOGY

## 2022-06-16 PROCEDURE — 81001 URINALYSIS AUTO W/SCOPE: CPT | Performed by: UROLOGY

## 2022-06-16 ASSESSMENT — ENCOUNTER SYMPTOMS
VOMITING: 0
ABDOMINAL DISTENTION: 0
SHORTNESS OF BREATH: 0
NAUSEA: 0
ABDOMINAL PAIN: 0
BACK PAIN: 0

## 2022-06-16 NOTE — PROGRESS NOTES
Rica Herrmann is a 67 y.o. male who presents today   Chief Complaint   Patient presents with    Cystoscopy     I am here today for cysto to evaluate blood in the urine. Hematuria  Patient complains of gross hematuria. Onset of hematuria was 4 weeks ago and was sudden in onset. There is not a history of nephrolithiasis. There is not a history of urologic trauma. Other urologic symptoms include none. Patient admits to history of tobacco use. Patient denies history of chronic James catheter,  surgeries, occupational exposure, sexually transmitted diseases, trauma and urolithiasis. Prior workup has been UA'S. Patient initially started passing clots he presented to his PCP where urine culture was positive on 3/30/2022 for E. coli. He denies any lower urinary tract symptoms with this UTI. Denies any history of UTIs. Repeat UA and culture were obtained on 4/19/2022, repeat culture was negative, UA revealed 88 RBCs. Denies any history of kidney stones. Denies any further gross hematuria. He does continue to have microscopic hematuria. 40 pack year history of smoking. Previous KUB negative. Denies any family history of prostate cancer. PSA within normal limits. Denies any lower urinary tract symptoms, not on any current prostate medications    Patient is here for cystoscopy. He had CT urogram which does show a sessile mass involving the right side of his bladder. There is no pelvic or retroperitoneal adenopathy or evidence of metastasis this is concerning for bladder cancer.       Past Medical History:   Diagnosis Date    Nocturia 6/27/2019    Positive colorectal cancer screening using Cologuard test 10/8/2019       Past Surgical History:   Procedure Laterality Date    CATARACT REMOVAL Bilateral     COLONOSCOPY N/A 11/7/2019    Dr DAYANA Eddy-Tubulovillous AP, (-) dysplasia x 1, tubular AP, (-) dysplasia x 4, BCM x 1, 3 yr recall    FRACTURE SURGERY Right     shoulder, ball and joint    HUMERUS FRACTURE SURGERY Right     LEG SURGERY Right     femur fracture    SHOULDER SURGERY Right 1965    shoulder fracture, ORIF, football injury       No current outpatient medications on file. No current facility-administered medications for this visit. No Known Allergies    Social History     Socioeconomic History    Marital status:      Spouse name: None    Number of children: None    Years of education: None    Highest education level: None   Occupational History    None   Tobacco Use    Smoking status: Current Every Day Smoker     Packs/day: 1.00     Years: 47.00     Pack years: 47.00     Types: Cigarettes    Smokeless tobacco: Never Used   Substance and Sexual Activity    Alcohol use: Yes     Alcohol/week: 14.0 standard drinks     Types: 14 Cans of beer per week     Comment: 2 beer per night    Drug use: Never    Sexual activity: None   Other Topics Concern    None   Social History Narrative    None     Social Determinants of Health     Financial Resource Strain:     Difficulty of Paying Living Expenses: Not on file   Food Insecurity:     Worried About Running Out of Food in the Last Year: Not on file    Silvestre of Food in the Last Year: Not on file   Transportation Needs:     Lack of Transportation (Medical): Not on file    Lack of Transportation (Non-Medical):  Not on file   Physical Activity: Sufficiently Active    Days of Exercise per Week: 7 days    Minutes of Exercise per Session: 60 min   Stress:     Feeling of Stress : Not on file   Social Connections:     Frequency of Communication with Friends and Family: Not on file    Frequency of Social Gatherings with Friends and Family: Not on file    Attends Zoroastrianism Services: Not on file    Active Member of Clubs or Organizations: Not on file    Attends Club or Organization Meetings: Not on file    Marital Status: Not on file   Intimate Partner Violence:     Fear of Current or Ex-Partner: Not on file   [employer] Abused: Not on file    Physically Abused: Not on file    Sexually Abused: Not on file   Housing Stability:     Unable to Pay for Housing in the Last Year: Not on file    Number of Places Lived in the Last Year: Not on file    Unstable Housing in the Last Year: Not on file       Family History   Problem Relation Age of Onset    Diabetes Mother     Colon Polyps Mother     Lung Cancer Father     No Known Problems Sister     Other Brother         disability with back    No Known Problems Maternal Grandmother     Heart Disease Maternal Grandfather     No Known Problems Paternal Grandmother     No Known Problems Paternal Grandfather     Pancreatic Cancer Daughter     Diabetes type 2  Daughter     Thyroid Disease Daughter     High Cholesterol Daughter     Colon Cancer Neg Hx     Esophageal Cancer Neg Hx     Liver Cancer Neg Hx     Liver Disease Neg Hx     Rectal Cancer Neg Hx     Stomach Cancer Neg Hx        REVIEW OF SYSTEMS:  Review of Systems   Constitutional: Negative for chills and fever. HENT: Negative. Respiratory: Negative for shortness of breath. Cardiovascular: Negative for chest pain. Gastrointestinal: Negative for abdominal distention, abdominal pain, nausea and vomiting. Genitourinary: Positive for hematuria. Negative for difficulty urinating, dysuria, flank pain, frequency and urgency. Musculoskeletal: Negative for back pain and gait problem. Neurological: Negative. Psychiatric/Behavioral: Negative for agitation and confusion. All other systems reviewed and are negative. PHYSICAL EXAM:  Temp 98.6 °F (37 °C) (Temporal)   Ht 6' 3\" (1.905 m)   Wt 188 lb (85.3 kg)   BMI 23.50 kg/m²   Physical Exam  Constitutional:       General: He is not in acute distress. Appearance: Normal appearance. He is well-developed. HENT:      Head: Normocephalic and atraumatic. Nose: Nose normal.   Eyes:      General: No scleral icterus.      Conjunctiva/sclera: Conjunctivae normal.      Pupils: Pupils are equal, round, and reactive to light. Neck:      Trachea: No tracheal deviation. Cardiovascular:      Rate and Rhythm: Normal rate and regular rhythm. Pulmonary:      Effort: Pulmonary effort is normal. No respiratory distress. Breath sounds: No stridor. Abdominal:      General: There is no distension. Palpations: Abdomen is soft. There is no mass. Tenderness: There is no abdominal tenderness. Genitourinary:     Penis: Normal.       Testes: Normal.   Musculoskeletal:         General: No tenderness. Normal range of motion. Cervical back: Normal range of motion and neck supple. Lymphadenopathy:      Cervical: No cervical adenopathy. Skin:     General: Skin is warm and dry. Findings: No erythema. Neurological:      Mental Status: He is alert and oriented to person, place, and time. Psychiatric:         Behavior: Behavior normal.         Judgment: Judgment normal.         Cystoscopy Procedure Note    Indications: Diagnosis    Pre-operative Diagnosis: Hematuria, bladder mass    Post-operative Diagnosis: Same    Surgeon: Ronda Burgos MD     Assistants: staff    Anesthesia: Local anesthesia topical 2% lidocaine gel    Procedure Details   The risks, benefits, complications, treatment options, and expected outcomes were discussed with the patient. The patient concurred with the proposed plan, giving informed consent. Cystoscopy was performed today under local anesthesia, using sterile technique. The patient was placed in the supine position, prepped with Hibiclens, and draped in the usual sterile fashion. A 17 Sudanese sheath flexible cystoscope was used to inspect both the urethra and bladder using the flexible scope. Findings:  Anterior urethra: normal without strictures and without scarring. Prostate:  Prostatic urethra: 2+ moderate prostatic hyperplasial lateral lobe Enlargement.   There is also a papillary tumor right at the bladder neck involving the right posterior prostate at about 7-8 o'clock. A smaller tumor at 6:00.  median lobe present? no.   Bladder: Abnormal papillary bladder mass involving the right posterior bladder right trigone area extending towards the bladder neck. The right UO was not identified, with lesions papillary tumor as described consistent with bladder cancer. Ureteral orifice(s) were not seen bilateral.                             Complications:  None; patient tolerated the procedure well. Disposition: To home after observation.            Condition: stable        DATA:  CBC:   Lab Results   Component Value Date    WBC 6.1 04/19/2022    RBC 4.94 04/19/2022    HGB 15.6 04/19/2022    HCT 47.4 04/19/2022    MCV 96.0 04/19/2022    MCH 31.6 04/19/2022    MCHC 32.9 04/19/2022    RDW 13.2 04/19/2022     04/19/2022    MPV 10.6 04/19/2022     CMP:    Lab Results   Component Value Date     04/19/2022    K 4.6 04/19/2022     04/19/2022    CO2 24 04/19/2022    BUN 18 04/19/2022    CREATININE 0.9 04/19/2022    GFRAA >59 04/19/2022    LABGLOM >60 04/19/2022    GLUCOSE 98 04/19/2022    PROT 7.4 04/19/2022    LABALBU 4.7 04/19/2022    CALCIUM 9.8 04/19/2022    BILITOT 1.0 04/19/2022    ALKPHOS 85 04/19/2022    AST 16 04/19/2022    ALT 11 04/19/2022     Results for orders placed or performed in visit on 06/16/22   POCT Urinalysis Dipstick w/ Micro (Auto)   Result Value Ref Range    Color, UA Yellow     Clarity, UA Clear Clear    Glucose, Ur neg     Bilirubin Urine 0 mg/dL    Ketones, Urine Negative     Specific Gravity, UA 1.030 1.005 - 1.030    Blood, Urine Positive     pH, UA 6.0 4.5 - 8.0    Protein, UA Positive (A) Negative    Nitrite, Urine Negative     Leukocytes, UA neg     Urobilinogen, Urine Normal     rbc urine, poc      wbc urine, poc 0     bacteria urine, poc 0     yeast urine, poc 0     casts urine, poc 0     Epi Cells Urine, POC 0     crystals urine, poc 0      Lab Results Component Value Date    PSA 0.43 04/19/2022    PSA 0.57 03/11/2021    PSA 0.35 07/02/2019           IMAGING:        EXAMINATION: CT ABDOMEN PELVIS W WO CONTRAST 5/6/2022 3:12 PM   HISTORY: Gross hematuria. DOSE: 1712 mGycm. Automatic exposure control was utilized in an effort   to use as little radiation as possible, without compromising image   quality. REPORT: Spiral CT of the abdomen and pelvis was performed with and   without intravenous contrast from the lung bases through the pubic   symphysis using CT urography protocol. Reconstructed coronal and   sagittal images are also reviewed. COMPARISON: There are no correlative imaging studies for comparison. Noncontrast images are reviewed initially, there is heavy calcified   atherosclerotic plaque within the coronary arteries, greatest at the   LAD distribution. Heart size appears normal. There are scattered   calcified granulomas in the left lung base. Review of lung windows   demonstrates mild subpleural scarring in the right middle lobe. No   pleural effusion is identified. There are scattered calcified   granulomas within the liver and spleen. Coarse calcifications are   noted within the head of the pancreas, compatible with chronic   pancreatitis. No gallstones are seen. There is a solitary   nonobstructing 2 mm nephrolithiasis in the mid right kidney. No   left-sided nephrolithiasis is identified. There is no   ureterolithiasis. There is heavy calcified atherosclerotic plaque   within the abdominal aorta and iliac arteries. Aortic caliber is   normal. There is mild ectasia of the iliac arteries. Arterial phase images demonstrate normal enhancement of the kidneys,   without evidence of a renal mass. Nephrographic phase images of the   kidneys are unremarkable.  The pelvis, the bladder is not well   distended, there is a hyperattenuating mass in the right bladder,   measuring approximately 3.7 x 2.7 cm,   Delayed images demonstrate contrast in the No   definite osseous metastases. Signed by Dr Gamal Gonzales. Dane         1. Gross hematuria  Source of hematuria is bladder mass. Cystoscopic findings confirm suspicion of CT scan likely has bladder cancer. He will need no operating room for TURBT. - Cystoscopy  - POCT Urinalysis D patient source of gross hematuria is bladder massipstick w/ Micro (Auto)    2. Malignant neoplasm of overlapping sites of bladder [facility])  As above he has papillary tumor involving the bladder neck right posterior base and right trigone area multiple overlapping sites. The ureteral orifices were not identified and specifically the right was not seen where the tumor was however there is not hydronephrosis seen on the CT. We will plan to take the operating room for cystoscopy TURBT bilateral retrograde pyelograms he possibly may need a right ureteral stent placement. This was discussed with the patient he understands agrees to proceed. The risk and complication including risk of bleeding infection bladder perforation need for ureteral stenting need for catheter and postop CBI and need for subsequent adjuvant or follow-up treatment or surveillance were discussed with him he understood. - IA CYSTOURETHROSCOPY,FULGUR 2-5CM LESN; Future      Orders Placed This Encounter   Procedures    POCT Urinalysis Dipstick w/ Micro (Auto)    IA CYSTOURETHROSCOPY,FULGUR 2-5CM LESN     Cystoscopy, TURBT and bilateral retrograde pyelograms, possible right ureteral stent placement     Standing Status:   Future     Standing Expiration Date:   9/16/2022    Cystoscopy        Return for PT to be scheduled for Surgery. All information inputted into the note by the MA to include chief complaint, past medical history, past surgical history, medications, allergies, social and family history and review of systems has been reviewed and updated as needed by me.     EMR Dragon/transcription disclaimer: Much of this documentt is electronic  transcription/translation of spoken language to printed text. The  electronic translation of spoken language may be erroneous, or at times,  nonsensical words or phrases may be inadvertently transcribed.  Although I  have reviewed the document for such errors, some may still exist.

## 2022-06-16 NOTE — H&P (VIEW-ONLY)
Fran Gallardo is a 67 y.o. male who presents today   Chief Complaint   Patient presents with    Cystoscopy     I am here today for cysto to evaluate blood in the urine. Hematuria  Patient complains of gross hematuria. Onset of hematuria was 4 weeks ago and was sudden in onset. There is not a history of nephrolithiasis. There is not a history of urologic trauma. Other urologic symptoms include none. Patient admits to history of tobacco use. Patient denies history of chronic James catheter,  surgeries, occupational exposure, sexually transmitted diseases, trauma and urolithiasis. Prior workup has been UA'S. Patient initially started passing clots he presented to his PCP where urine culture was positive on 3/30/2022 for E. coli. He denies any lower urinary tract symptoms with this UTI. Denies any history of UTIs. Repeat UA and culture were obtained on 4/19/2022, repeat culture was negative, UA revealed 88 RBCs. Denies any history of kidney stones. Denies any further gross hematuria. He does continue to have microscopic hematuria. 40 pack year history of smoking. Previous KUB negative. Denies any family history of prostate cancer. PSA within normal limits. Denies any lower urinary tract symptoms, not on any current prostate medications    Patient is here for cystoscopy. He had CT urogram which does show a sessile mass involving the right side of his bladder. There is no pelvic or retroperitoneal adenopathy or evidence of metastasis this is concerning for bladder cancer.       Past Medical History:   Diagnosis Date    Nocturia 6/27/2019    Positive colorectal cancer screening using Cologuard test 10/8/2019       Past Surgical History:   Procedure Laterality Date    CATARACT REMOVAL Bilateral     COLONOSCOPY N/A 11/7/2019    Dr DAYANA Eddy-Tubulovillous AP, (-) dysplasia x 1, tubular AP, (-) dysplasia x 4, BCM x 1, 3 yr recall    FRACTURE SURGERY Right     shoulder, ball and joint    HUMERUS FRACTURE SURGERY Right     LEG SURGERY Right     femur fracture    SHOULDER SURGERY Right 1965    shoulder fracture, ORIF, football injury       No current outpatient medications on file. No current facility-administered medications for this visit. No Known Allergies    Social History     Socioeconomic History    Marital status:      Spouse name: None    Number of children: None    Years of education: None    Highest education level: None   Occupational History    None   Tobacco Use    Smoking status: Current Every Day Smoker     Packs/day: 1.00     Years: 47.00     Pack years: 47.00     Types: Cigarettes    Smokeless tobacco: Never Used   Substance and Sexual Activity    Alcohol use: Yes     Alcohol/week: 14.0 standard drinks     Types: 14 Cans of beer per week     Comment: 2 beer per night    Drug use: Never    Sexual activity: None   Other Topics Concern    None   Social History Narrative    None     Social Determinants of Health     Financial Resource Strain:     Difficulty of Paying Living Expenses: Not on file   Food Insecurity:     Worried About Running Out of Food in the Last Year: Not on file    Silvestre of Food in the Last Year: Not on file   Transportation Needs:     Lack of Transportation (Medical): Not on file    Lack of Transportation (Non-Medical):  Not on file   Physical Activity: Sufficiently Active    Days of Exercise per Week: 7 days    Minutes of Exercise per Session: 60 min   Stress:     Feeling of Stress : Not on file   Social Connections:     Frequency of Communication with Friends and Family: Not on file    Frequency of Social Gatherings with Friends and Family: Not on file    Attends Restorationism Services: Not on file    Active Member of Clubs or Organizations: Not on file    Attends Club or Organization Meetings: Not on file    Marital Status: Not on file   Intimate Partner Violence:     Fear of Current or Ex-Partner: Not on file   [employer] Abused: Not on file    Physically Abused: Not on file    Sexually Abused: Not on file   Housing Stability:     Unable to Pay for Housing in the Last Year: Not on file    Number of Places Lived in the Last Year: Not on file    Unstable Housing in the Last Year: Not on file       Family History   Problem Relation Age of Onset    Diabetes Mother     Colon Polyps Mother     Lung Cancer Father     No Known Problems Sister     Other Brother         disability with back    No Known Problems Maternal Grandmother     Heart Disease Maternal Grandfather     No Known Problems Paternal Grandmother     No Known Problems Paternal Grandfather     Pancreatic Cancer Daughter     Diabetes type 2  Daughter     Thyroid Disease Daughter     High Cholesterol Daughter     Colon Cancer Neg Hx     Esophageal Cancer Neg Hx     Liver Cancer Neg Hx     Liver Disease Neg Hx     Rectal Cancer Neg Hx     Stomach Cancer Neg Hx        REVIEW OF SYSTEMS:  Review of Systems   Constitutional: Negative for chills and fever. HENT: Negative. Respiratory: Negative for shortness of breath. Cardiovascular: Negative for chest pain. Gastrointestinal: Negative for abdominal distention, abdominal pain, nausea and vomiting. Genitourinary: Positive for hematuria. Negative for difficulty urinating, dysuria, flank pain, frequency and urgency. Musculoskeletal: Negative for back pain and gait problem. Neurological: Negative. Psychiatric/Behavioral: Negative for agitation and confusion. All other systems reviewed and are negative. PHYSICAL EXAM:  Temp 98.6 °F (37 °C) (Temporal)   Ht 6' 3\" (1.905 m)   Wt 188 lb (85.3 kg)   BMI 23.50 kg/m²   Physical Exam  Constitutional:       General: He is not in acute distress. Appearance: Normal appearance. He is well-developed. HENT:      Head: Normocephalic and atraumatic. Nose: Nose normal.   Eyes:      General: No scleral icterus.      Conjunctiva/sclera: Conjunctivae normal.      Pupils: Pupils are equal, round, and reactive to light. Neck:      Trachea: No tracheal deviation. Cardiovascular:      Rate and Rhythm: Normal rate and regular rhythm. Pulmonary:      Effort: Pulmonary effort is normal. No respiratory distress. Breath sounds: No stridor. Abdominal:      General: There is no distension. Palpations: Abdomen is soft. There is no mass. Tenderness: There is no abdominal tenderness. Genitourinary:     Penis: Normal.       Testes: Normal.   Musculoskeletal:         General: No tenderness. Normal range of motion. Cervical back: Normal range of motion and neck supple. Lymphadenopathy:      Cervical: No cervical adenopathy. Skin:     General: Skin is warm and dry. Findings: No erythema. Neurological:      Mental Status: He is alert and oriented to person, place, and time. Psychiatric:         Behavior: Behavior normal.         Judgment: Judgment normal.         Cystoscopy Procedure Note    Indications: Diagnosis    Pre-operative Diagnosis: Hematuria, bladder mass    Post-operative Diagnosis: Same    Surgeon: Arsen Jeffers MD     Assistants: staff    Anesthesia: Local anesthesia topical 2% lidocaine gel    Procedure Details   The risks, benefits, complications, treatment options, and expected outcomes were discussed with the patient. The patient concurred with the proposed plan, giving informed consent. Cystoscopy was performed today under local anesthesia, using sterile technique. The patient was placed in the supine position, prepped with Hibiclens, and draped in the usual sterile fashion. A 17 [place] citizen sheath flexible cystoscope was used to inspect both the urethra and bladder using the flexible scope. Findings:  Anterior urethra: normal without strictures and without scarring. Prostate:  Prostatic urethra: 2+ moderate prostatic hyperplasial lateral lobe Enlargement.   There is also a papillary tumor right at bladder, the margins of the   mass which is very irregular in the mass has a sessile appearance. This is compatible with bladder neoplasm until proven otherwise. Direct visualization and biopsy is recommended. The collecting   structures of the kidneys and ureters appear normal. The bladder mass   does appear to cover the orifice of the right ureter at least   partially. The liver and spleen are homogeneous on enhanced images with no   evidence of a mass. The gallbladder is unremarkable. The pancreas and   adrenal glands are within normal limits. Bowel loops are normal in   caliber and appear unremarkable. No free fluid or free air is   identified. No intra-abdominal pelvic lymphadenopathy is identified. The appendix appears normal. There is diastases of the rectus   abdominis muscle which are, with a small 2 cm fat-containing   periumbilical hernia. Review of bone windows shows no acute   abnormality, there is previous fixation of the right hip. There is   chronic bilateral sacroiliitis. Degenerative changes are seen in the   thoracolumbar spine diffusely and there is a chronic-appearing   superior endplate compression fracture at L1 with associated Schmorl's   node defect.       Impression   1. Sessile appearing mass in the base of the bladder on the right,   measuring approximately 3.7 x 2.7 cm, this is compatible with bladder   neoplasm until proven otherwise. Consider follow-up with direct   visualization and biopsy. The mass partially covers the right ureteral   orifice. There is no urinary tract obstruction and the kidneys and   ureters appear unremarkable except for a 2 mm nonobstructing stone in   the right mid kidney. No intra-abdominal or pelvic lymphadenopathy is   identified. 2. Coarse calcifications within the pancreas compatible chronic   pancreatitis. 3. Chronic bilateral sacroiliitis. Chronic-appearing superior endplate   compression fracture of L1 with associated Schmorl's node defect. No   definite osseous metastases. Signed by Dr Berny Prasad. [place]         1. Gross hematuria  Source of hematuria is bladder mass. Cystoscopic findings confirm suspicion of CT scan likely has bladder cancer. He will need no operating room for TURBT. - Cystoscopy  - POCT Urinalysis D patient source of gross hematuria is bladder massipstick w/ Micro (Auto)    2. Malignant neoplasm of overlapping sites of bladder [facility])  As above he has papillary tumor involving the bladder neck right posterior base and right trigone area multiple overlapping sites. The ureteral orifices were not identified and specifically the right was not seen where the tumor was however there is not hydronephrosis seen on the CT. We will plan to take the operating room for cystoscopy TURBT bilateral retrograde pyelograms he possibly may need a right ureteral stent placement. This was discussed with the patient he understands agrees to proceed. The risk and complication including risk of bleeding infection bladder perforation need for ureteral stenting need for catheter and postop CBI and need for subsequent adjuvant or follow-up treatment or surveillance were discussed with him he understood. - ND CYSTOURETHROSCOPY,FULGUR 2-5CM LESN; Future      Orders Placed This Encounter   Procedures    POCT Urinalysis Dipstick w/ Micro (Auto)    ND CYSTOURETHROSCOPY,FULGUR 2-5CM LESN     Cystoscopy, TURBT and bilateral retrograde pyelograms, possible right ureteral stent placement     Standing Status:   Future     Standing Expiration Date:   9/16/2022    Cystoscopy        Return for PT to be scheduled for Surgery. All information inputted into the note by the MA to include chief complaint, past medical history, past surgical history, medications, allergies, social and family history and review of systems has been reviewed and updated as needed by me.     EMR Dragon/transcription disclaimer: Much of this documentt is electronic  transcription/translation of spoken language to printed text. The  electronic translation of spoken language may be erroneous, or at times,  nonsensical words or phrases may be inadvertently transcribed.  Although I  have reviewed the document for such errors, some may still exist.

## 2022-06-16 NOTE — LETTER
[facility]  [place] [address]  [address] [place] [address]  Phone: [phone]  Fax: [phone]    Nghia Brady MD    June 17, 2022     Michelle Pritchett MD  [address] [place] [address]  [facility] [address] [facility] [address]    Patient: Josiah Haddad   MR Number: [id number]   YOB: 1950   Date of Visit: 6/16/2022       Dear Michelle Pritchett: Thank you for referring Shannon Martins to me for evaluation/treatment. Below are the relevant portions of my assessment and plan of care. If you have questions, please do not hesitate to call me. I look forward to following Isamar Townsend along with you.     Sincerely,      Nghia Brady MD

## 2022-06-23 ENCOUNTER — HOSPITAL ENCOUNTER (OUTPATIENT)
Dept: PREADMISSION TESTING | Age: 72
Discharge: HOME | End: 2022-06-27
Payer: MEDICARE

## 2022-06-23 ENCOUNTER — HOSPITAL ENCOUNTER (OUTPATIENT)
Dept: GENERAL RADIOLOGY | Age: 72
Discharge: HOME | End: 2022-06-23
Payer: MEDICARE

## 2022-06-23 LAB
ALBUMIN SERPL-MCNC: 4.4 G/DL (ref 3.5–5.2)
ALP BLD-CCNC: 80 U/L (ref 40–130)
ALT SERPL-CCNC: 10 U/L (ref 5–41)
ANION GAP SERPL CALCULATED.3IONS-SCNC: 10 MMOL/L (ref 7–19)
APTT: 33.3 SEC (ref 26–36.2)
AST SERPL-CCNC: 17 U/L (ref 5–40)
BASOPHILS ABSOLUTE: 0.1 K/UL (ref 0–0.2)
BASOPHILS RELATIVE PERCENT: 1.4 % (ref 0–1)
BILIRUB SERPL-MCNC: 1.6 MG/DL (ref 0.2–1.2)
BUN BLDV-MCNC: 18 MG/DL (ref 8–23)
CALCIUM SERPL-MCNC: 9.3 MG/DL (ref 8.8–10.2)
CHLORIDE BLD-SCNC: 106 MMOL/L (ref 98–111)
CO2: 23 MMOL/L (ref 22–29)
CREAT SERPL-MCNC: 0.8 MG/DL (ref 0.5–1.2)
EOSINOPHILS ABSOLUTE: 0.1 K/UL (ref 0–0.6)
EOSINOPHILS RELATIVE PERCENT: 1.9 % (ref 0–5)
GFR AFRICAN AMERICAN: >59
GFR NON-AFRICAN AMERICAN: >60
GLUCOSE BLD-MCNC: 89 MG/DL (ref 74–109)
HCT VFR BLD CALC: 47 % (ref 42–52)
HEMOGLOBIN: 15.2 G/DL (ref 14–18)
IMMATURE GRANULOCYTES #: 0 K/UL
INR BLD: 0.99 (ref 0.88–1.18)
LYMPHOCYTES ABSOLUTE: 1.3 K/UL (ref 1.1–4.5)
LYMPHOCYTES RELATIVE PERCENT: 21.1 % (ref 20–40)
MCH RBC QN AUTO: 31.9 PG (ref 27–31)
MCHC RBC AUTO-ENTMCNC: 32.3 G/DL (ref 33–37)
MCV RBC AUTO: 98.5 FL (ref 80–94)
MONOCYTES ABSOLUTE: 0.8 K/UL (ref 0–0.9)
MONOCYTES RELATIVE PERCENT: 12.9 % (ref 0–10)
NEUTROPHILS ABSOLUTE: 3.9 K/UL (ref 1.5–7.5)
NEUTROPHILS RELATIVE PERCENT: 62.5 % (ref 50–65)
PDW BLD-RTO: 13.5 % (ref 11.5–14.5)
PLATELET # BLD: 183 K/UL (ref 130–400)
PMV BLD AUTO: 11.2 FL (ref 9.4–12.4)
POTASSIUM SERPL-SCNC: 4.3 MMOL/L (ref 3.5–5)
PROTHROMBIN TIME: 13 SEC (ref 12–14.6)
RBC # BLD: 4.77 M/UL (ref 4.7–6.1)
SODIUM BLD-SCNC: 139 MMOL/L (ref 136–145)
TOTAL PROTEIN: 7.3 G/DL (ref 6.6–8.7)
WBC # BLD: 6.3 K/UL (ref 4.8–10.8)

## 2022-06-23 PROCEDURE — 85025 COMPLETE CBC W/AUTO DIFF WBC: CPT

## 2022-06-23 PROCEDURE — 93005 ELECTROCARDIOGRAM TRACING: CPT | Performed by: UROLOGY

## 2022-06-23 PROCEDURE — 85610 PROTHROMBIN TIME: CPT

## 2022-06-23 PROCEDURE — 71046 X-RAY EXAM CHEST 2 VIEWS: CPT | Performed by: RADIOLOGY

## 2022-06-23 PROCEDURE — 71046 X-RAY EXAM CHEST 2 VIEWS: CPT

## 2022-06-23 PROCEDURE — 80053 COMPREHEN METABOLIC PANEL: CPT

## 2022-06-23 PROCEDURE — 85730 THROMBOPLASTIN TIME PARTIAL: CPT

## 2022-06-23 RX ORDER — LEVOFLOXACIN 5 MG/ML
500 INJECTION, SOLUTION INTRAVENOUS ONCE
Status: CANCELLED | OUTPATIENT
Start: 2022-06-29

## 2022-06-24 ENCOUNTER — HOSPITAL ENCOUNTER (OUTPATIENT)
Dept: PREADMISSION TESTING | Age: 72
Discharge: HOME | End: 2022-06-28
Payer: MEDICARE

## 2022-06-24 LAB — SARS-COV-2, PCR: NOT DETECTED

## 2022-06-24 PROCEDURE — U0005 INFEC AGEN DETEC AMPLI PROBE: HCPCS

## 2022-06-24 PROCEDURE — U0003 INFECTIOUS AGENT DETECTION BY NUCLEIC ACID (DNA OR RNA); SEVERE ACUTE RESPIRATORY SYNDROME CORONAVIRUS 2 (SARS-COV-2) (CORONAVIRUS DISEASE [COVID-19]), AMPLIFIED PROBE TECHNIQUE, MAKING USE OF HIGH THROUGHPUT TECHNOLOGIES AS DESCRIBED BY CMS-2020-01-R: HCPCS

## 2022-06-25 LAB
EKG P AXIS: 68 DEGREES
EKG P-R INTERVAL: 162 MS
EKG Q-T INTERVAL: 462 MS
EKG QRS DURATION: 90 MS
EKG QTC CALCULATION (BAZETT): 448 MS
EKG T AXIS: 56 DEGREES

## 2022-06-25 PROCEDURE — 93010 ELECTROCARDIOGRAM REPORT: CPT | Performed by: INTERNAL MEDICINE

## 2022-06-29 ENCOUNTER — ANESTHESIA (OUTPATIENT)
Dept: OPERATING ROOM | Age: 72
End: 2022-06-29
Payer: MEDICARE

## 2022-06-29 ENCOUNTER — ANESTHESIA EVENT (OUTPATIENT)
Dept: OPERATING ROOM | Age: 72
End: 2022-06-29
Payer: MEDICARE

## 2022-06-29 ENCOUNTER — APPOINTMENT (OUTPATIENT)
Dept: GENERAL RADIOLOGY | Age: 72
End: 2022-06-29
Attending: UROLOGY
Payer: MEDICARE

## 2022-06-29 ENCOUNTER — HOSPITAL ENCOUNTER (OUTPATIENT)
Age: 72
Setting detail: OBSERVATION
Discharge: HOME | End: 2022-06-30
Attending: UROLOGY | Admitting: UROLOGY
Payer: MEDICARE

## 2022-06-29 DIAGNOSIS — C67.8 CANCER OF OVERLAPPING SITES OF BLADDER (HCC): ICD-10-CM

## 2022-06-29 DIAGNOSIS — G89.18 POST-OP PAIN: Primary | ICD-10-CM

## 2022-06-29 PROBLEM — C67.9 BLADDER CANCER (HCC): Status: ACTIVE | Noted: 2022-06-29

## 2022-06-29 PROCEDURE — G0378 HOSPITAL OBSERVATION PER HR: HCPCS

## 2022-06-29 PROCEDURE — 6360000002 HC RX W HCPCS: Performed by: UROLOGY

## 2022-06-29 PROCEDURE — C2617 STENT, NON-COR, TEM W/O DEL: HCPCS | Performed by: UROLOGY

## 2022-06-29 PROCEDURE — 3700000001 HC ADD 15 MINUTES (ANESTHESIA): Performed by: UROLOGY

## 2022-06-29 PROCEDURE — 2500000003 HC RX 250 WO HCPCS: Performed by: NURSE ANESTHETIST, CERTIFIED REGISTERED

## 2022-06-29 PROCEDURE — C1769 GUIDE WIRE: HCPCS | Performed by: UROLOGY

## 2022-06-29 PROCEDURE — 52240 CYSTOSCOPY AND TREATMENT: CPT | Performed by: UROLOGY

## 2022-06-29 PROCEDURE — 7100000001 HC PACU RECOVERY - ADDTL 15 MIN: Performed by: UROLOGY

## 2022-06-29 PROCEDURE — 6370000000 HC RX 637 (ALT 250 FOR IP): Performed by: UROLOGY

## 2022-06-29 PROCEDURE — 2580000003 HC RX 258: Performed by: ANESTHESIOLOGY

## 2022-06-29 PROCEDURE — C1758 CATHETER, URETERAL: HCPCS | Performed by: UROLOGY

## 2022-06-29 PROCEDURE — 88307 TISSUE EXAM BY PATHOLOGIST: CPT

## 2022-06-29 PROCEDURE — 74420 UROGRAPHY RTRGR +-KUB: CPT | Performed by: UROLOGY

## 2022-06-29 PROCEDURE — 6360000002 HC RX W HCPCS: Performed by: NURSE ANESTHETIST, CERTIFIED REGISTERED

## 2022-06-29 PROCEDURE — 3600000014 HC SURGERY LEVEL 4 ADDTL 15MIN: Performed by: UROLOGY

## 2022-06-29 PROCEDURE — 2709999900 HC NON-CHARGEABLE SUPPLY: Performed by: UROLOGY

## 2022-06-29 PROCEDURE — 3209999900 FLUORO FOR SURGICAL PROCEDURES

## 2022-06-29 PROCEDURE — 2580000003 HC RX 258: Performed by: UROLOGY

## 2022-06-29 PROCEDURE — 6360000002 HC RX W HCPCS: Performed by: ANESTHESIOLOGY

## 2022-06-29 PROCEDURE — 7100000000 HC PACU RECOVERY - FIRST 15 MIN: Performed by: UROLOGY

## 2022-06-29 PROCEDURE — 2720000010 HC SURG SUPPLY STERILE: Performed by: UROLOGY

## 2022-06-29 PROCEDURE — 3700000000 HC ANESTHESIA ATTENDED CARE: Performed by: UROLOGY

## 2022-06-29 PROCEDURE — 3600000004 HC SURGERY LEVEL 4 BASE: Performed by: UROLOGY

## 2022-06-29 DEVICE — URETERAL STENT
Type: IMPLANTABLE DEVICE | Site: URETER | Status: FUNCTIONAL
Brand: POLARIS™ ULTRA

## 2022-06-29 RX ORDER — HYDROCODONE BITARTRATE AND ACETAMINOPHEN 5; 325 MG/1; MG/1
2 TABLET ORAL EVERY 4 HOURS PRN
Status: DISCONTINUED | OUTPATIENT
Start: 2022-06-29 | End: 2022-06-30 | Stop reason: HOSPADM

## 2022-06-29 RX ORDER — DEXAMETHASONE SODIUM PHOSPHATE 10 MG/ML
INJECTION, SOLUTION INTRAMUSCULAR; INTRAVENOUS PRN
Status: DISCONTINUED | OUTPATIENT
Start: 2022-06-29 | End: 2022-06-29 | Stop reason: SDUPTHER

## 2022-06-29 RX ORDER — METOCLOPRAMIDE HYDROCHLORIDE 5 MG/ML
10 INJECTION INTRAMUSCULAR; INTRAVENOUS
Status: DISCONTINUED | OUTPATIENT
Start: 2022-06-29 | End: 2022-06-29 | Stop reason: HOSPADM

## 2022-06-29 RX ORDER — MIDAZOLAM HYDROCHLORIDE 1 MG/ML
2 INJECTION INTRAMUSCULAR; INTRAVENOUS
Status: DISCONTINUED | OUTPATIENT
Start: 2022-06-29 | End: 2022-06-29 | Stop reason: HOSPADM

## 2022-06-29 RX ORDER — SCOLOPAMINE TRANSDERMAL SYSTEM 1 MG/1
1 PATCH, EXTENDED RELEASE TRANSDERMAL
Status: DISCONTINUED | OUTPATIENT
Start: 2022-06-29 | End: 2022-06-29 | Stop reason: HOSPADM

## 2022-06-29 RX ORDER — MEPERIDINE HYDROCHLORIDE 25 MG/ML
12.5 INJECTION INTRAMUSCULAR; INTRAVENOUS; SUBCUTANEOUS EVERY 5 MIN PRN
Status: COMPLETED | OUTPATIENT
Start: 2022-06-29 | End: 2022-06-29

## 2022-06-29 RX ORDER — POLYETHYLENE GLYCOL 3350 17 G/17G
17 POWDER, FOR SOLUTION ORAL DAILY PRN
Status: DISCONTINUED | OUTPATIENT
Start: 2022-06-29 | End: 2022-06-30 | Stop reason: HOSPADM

## 2022-06-29 RX ORDER — FENTANYL CITRATE 50 UG/ML
INJECTION, SOLUTION INTRAMUSCULAR; INTRAVENOUS PRN
Status: DISCONTINUED | OUTPATIENT
Start: 2022-06-29 | End: 2022-06-29 | Stop reason: SDUPTHER

## 2022-06-29 RX ORDER — SUCCINYLCHOLINE/SOD CL,ISO/PF 100 MG/5ML
SYRINGE (ML) INTRAVENOUS PRN
Status: DISCONTINUED | OUTPATIENT
Start: 2022-06-29 | End: 2022-06-29 | Stop reason: SDUPTHER

## 2022-06-29 RX ORDER — NICOTINE 21 MG/24HR
1 PATCH, TRANSDERMAL 24 HOURS TRANSDERMAL DAILY
Status: DISCONTINUED | OUTPATIENT
Start: 2022-06-29 | End: 2022-06-30 | Stop reason: HOSPADM

## 2022-06-29 RX ORDER — SODIUM CHLORIDE 0.9 % (FLUSH) 0.9 %
5-40 SYRINGE (ML) INJECTION PRN
Status: DISCONTINUED | OUTPATIENT
Start: 2022-06-29 | End: 2022-06-30 | Stop reason: HOSPADM

## 2022-06-29 RX ORDER — ACETAMINOPHEN 500 MG
1000 TABLET ORAL EVERY 6 HOURS PRN
Status: DISCONTINUED | OUTPATIENT
Start: 2022-06-29 | End: 2022-06-30 | Stop reason: HOSPADM

## 2022-06-29 RX ORDER — SODIUM CHLORIDE 0.9 % (FLUSH) 0.9 %
5-40 SYRINGE (ML) INJECTION EVERY 12 HOURS SCHEDULED
Status: DISCONTINUED | OUTPATIENT
Start: 2022-06-29 | End: 2022-06-30 | Stop reason: HOSPADM

## 2022-06-29 RX ORDER — HYDROCODONE BITARTRATE AND ACETAMINOPHEN 5; 325 MG/1; MG/1
1 TABLET ORAL EVERY 4 HOURS PRN
Status: DISCONTINUED | OUTPATIENT
Start: 2022-06-29 | End: 2022-06-30 | Stop reason: HOSPADM

## 2022-06-29 RX ORDER — ONDANSETRON 4 MG/1
4 TABLET, ORALLY DISINTEGRATING ORAL EVERY 8 HOURS PRN
Status: DISCONTINUED | OUTPATIENT
Start: 2022-06-29 | End: 2022-06-30 | Stop reason: HOSPADM

## 2022-06-29 RX ORDER — LEVOFLOXACIN 5 MG/ML
500 INJECTION, SOLUTION INTRAVENOUS ONCE
Status: COMPLETED | OUTPATIENT
Start: 2022-06-29 | End: 2022-06-29

## 2022-06-29 RX ORDER — FAMOTIDINE 20 MG/1
20 TABLET, FILM COATED ORAL ONCE
Status: DISCONTINUED | OUTPATIENT
Start: 2022-06-29 | End: 2022-06-29 | Stop reason: HOSPADM

## 2022-06-29 RX ORDER — LIDOCAINE HYDROCHLORIDE 10 MG/ML
1 INJECTION, SOLUTION EPIDURAL; INFILTRATION; INTRACAUDAL; PERINEURAL
Status: DISCONTINUED | OUTPATIENT
Start: 2022-06-29 | End: 2022-06-29 | Stop reason: HOSPADM

## 2022-06-29 RX ORDER — HYDROMORPHONE HYDROCHLORIDE 1 MG/ML
0.5 INJECTION, SOLUTION INTRAMUSCULAR; INTRAVENOUS; SUBCUTANEOUS
Status: DISCONTINUED | OUTPATIENT
Start: 2022-06-29 | End: 2022-06-30 | Stop reason: HOSPADM

## 2022-06-29 RX ORDER — ONDANSETRON 2 MG/ML
4 INJECTION INTRAMUSCULAR; INTRAVENOUS EVERY 6 HOURS PRN
Status: DISCONTINUED | OUTPATIENT
Start: 2022-06-29 | End: 2022-06-30 | Stop reason: HOSPADM

## 2022-06-29 RX ORDER — HYDROMORPHONE HYDROCHLORIDE 1 MG/ML
0.25 INJECTION, SOLUTION INTRAMUSCULAR; INTRAVENOUS; SUBCUTANEOUS
Status: DISCONTINUED | OUTPATIENT
Start: 2022-06-29 | End: 2022-06-30 | Stop reason: HOSPADM

## 2022-06-29 RX ORDER — CIPROFLOXACIN 2 MG/ML
400 INJECTION, SOLUTION INTRAVENOUS EVERY 12 HOURS
Status: DISCONTINUED | OUTPATIENT
Start: 2022-06-29 | End: 2022-06-30 | Stop reason: HOSPADM

## 2022-06-29 RX ORDER — LIDOCAINE HYDROCHLORIDE 10 MG/ML
INJECTION, SOLUTION INFILTRATION; PERINEURAL PRN
Status: DISCONTINUED | OUTPATIENT
Start: 2022-06-29 | End: 2022-06-29 | Stop reason: SDUPTHER

## 2022-06-29 RX ORDER — ATROPA BELLADONNA AND OPIUM 16.2; 3 MG/1; MG/1
30 SUPPOSITORY RECTAL EVERY 8 HOURS PRN
Status: DISCONTINUED | OUTPATIENT
Start: 2022-06-29 | End: 2022-06-30 | Stop reason: HOSPADM

## 2022-06-29 RX ORDER — MORPHINE SULFATE 2 MG/ML
2 INJECTION, SOLUTION INTRAMUSCULAR; INTRAVENOUS EVERY 5 MIN PRN
Status: DISCONTINUED | OUTPATIENT
Start: 2022-06-29 | End: 2022-06-29 | Stop reason: HOSPADM

## 2022-06-29 RX ORDER — SODIUM CHLORIDE 9 MG/ML
INJECTION, SOLUTION INTRAVENOUS PRN
Status: DISCONTINUED | OUTPATIENT
Start: 2022-06-29 | End: 2022-06-30 | Stop reason: HOSPADM

## 2022-06-29 RX ORDER — PROPOFOL 10 MG/ML
INJECTION, EMULSION INTRAVENOUS PRN
Status: DISCONTINUED | OUTPATIENT
Start: 2022-06-29 | End: 2022-06-29 | Stop reason: SDUPTHER

## 2022-06-29 RX ORDER — SODIUM CHLORIDE, SODIUM LACTATE, POTASSIUM CHLORIDE, CALCIUM CHLORIDE 600; 310; 30; 20 MG/100ML; MG/100ML; MG/100ML; MG/100ML
INJECTION, SOLUTION INTRAVENOUS CONTINUOUS
Status: DISCONTINUED | OUTPATIENT
Start: 2022-06-29 | End: 2022-06-29 | Stop reason: HOSPADM

## 2022-06-29 RX ORDER — SODIUM CHLORIDE 9 MG/ML
INJECTION, SOLUTION INTRAVENOUS CONTINUOUS
Status: DISCONTINUED | OUTPATIENT
Start: 2022-06-29 | End: 2022-06-30 | Stop reason: HOSPADM

## 2022-06-29 RX ORDER — ATROPA BELLADONNA AND OPIUM 16.2; 6 MG/1; MG/1
SUPPOSITORY RECTAL PRN
Status: DISCONTINUED | OUTPATIENT
Start: 2022-06-29 | End: 2022-06-29 | Stop reason: ALTCHOICE

## 2022-06-29 RX ORDER — MORPHINE SULFATE 4 MG/ML
4 INJECTION, SOLUTION INTRAMUSCULAR; INTRAVENOUS EVERY 5 MIN PRN
Status: DISCONTINUED | OUTPATIENT
Start: 2022-06-29 | End: 2022-06-29 | Stop reason: HOSPADM

## 2022-06-29 RX ORDER — ROCURONIUM BROMIDE 10 MG/ML
INJECTION, SOLUTION INTRAVENOUS PRN
Status: DISCONTINUED | OUTPATIENT
Start: 2022-06-29 | End: 2022-06-29 | Stop reason: SDUPTHER

## 2022-06-29 RX ORDER — DIPHENHYDRAMINE HYDROCHLORIDE 50 MG/ML
12.5 INJECTION INTRAMUSCULAR; INTRAVENOUS
Status: DISCONTINUED | OUTPATIENT
Start: 2022-06-29 | End: 2022-06-29 | Stop reason: HOSPADM

## 2022-06-29 RX ADMIN — LIDOCAINE HYDROCHLORIDE 50 MG: 10 INJECTION, SOLUTION INFILTRATION; PERINEURAL at 08:22

## 2022-06-29 RX ADMIN — ROCURONIUM BROMIDE 10 MG: 10 INJECTION, SOLUTION INTRAVENOUS at 08:57

## 2022-06-29 RX ADMIN — MORPHINE SULFATE 4 MG: 4 INJECTION, SOLUTION INTRAMUSCULAR; INTRAVENOUS at 11:01

## 2022-06-29 RX ADMIN — CIPROFLOXACIN 400 MG: 2 INJECTION, SOLUTION INTRAVENOUS at 20:45

## 2022-06-29 RX ADMIN — SODIUM CHLORIDE, POTASSIUM CHLORIDE, SODIUM LACTATE AND CALCIUM CHLORIDE: 600; 310; 30; 20 INJECTION, SOLUTION INTRAVENOUS at 06:42

## 2022-06-29 RX ADMIN — PROPOFOL 150 MG: 10 INJECTION, EMULSION INTRAVENOUS at 08:22

## 2022-06-29 RX ADMIN — Medication 100 MG: at 08:22

## 2022-06-29 RX ADMIN — ROCURONIUM BROMIDE 45 MG: 10 INJECTION, SOLUTION INTRAVENOUS at 08:27

## 2022-06-29 RX ADMIN — FENTANYL CITRATE 50 MCG: 50 INJECTION, SOLUTION INTRAMUSCULAR; INTRAVENOUS at 08:41

## 2022-06-29 RX ADMIN — MEPERIDINE HYDROCHLORIDE 12.5 MG: 25 INJECTION, SOLUTION INTRAMUSCULAR; INTRAVENOUS; SUBCUTANEOUS at 10:10

## 2022-06-29 RX ADMIN — DEXAMETHASONE SODIUM PHOSPHATE 10 MG: 10 INJECTION, SOLUTION INTRAMUSCULAR; INTRAVENOUS at 08:26

## 2022-06-29 RX ADMIN — ROCURONIUM BROMIDE 5 MG: 10 INJECTION, SOLUTION INTRAVENOUS at 08:22

## 2022-06-29 RX ADMIN — LEVOFLOXACIN 500 MG: 5 INJECTION, SOLUTION INTRAVENOUS at 08:28

## 2022-06-29 RX ADMIN — HYDROMORPHONE HYDROCHLORIDE 0.5 MG: 1 INJECTION, SOLUTION INTRAMUSCULAR; INTRAVENOUS; SUBCUTANEOUS at 09:49

## 2022-06-29 RX ADMIN — FENTANYL CITRATE 25 MCG: 50 INJECTION, SOLUTION INTRAMUSCULAR; INTRAVENOUS at 09:43

## 2022-06-29 RX ADMIN — SODIUM CHLORIDE: 9 INJECTION, SOLUTION INTRAVENOUS at 12:00

## 2022-06-29 RX ADMIN — SUGAMMADEX 200 MG: 100 INJECTION, SOLUTION INTRAVENOUS at 09:42

## 2022-06-29 RX ADMIN — FENTANYL CITRATE 50 MCG: 50 INJECTION, SOLUTION INTRAMUSCULAR; INTRAVENOUS at 09:36

## 2022-06-29 RX ADMIN — SODIUM CHLORIDE, PRESERVATIVE FREE 10 ML: 5 INJECTION INTRAVENOUS at 20:45

## 2022-06-29 RX ADMIN — HYDROMORPHONE HYDROCHLORIDE 0.5 MG: 1 INJECTION, SOLUTION INTRAMUSCULAR; INTRAVENOUS; SUBCUTANEOUS at 12:08

## 2022-06-29 RX ADMIN — MEPERIDINE HYDROCHLORIDE 12.5 MG: 25 INJECTION, SOLUTION INTRAMUSCULAR; INTRAVENOUS; SUBCUTANEOUS at 10:15

## 2022-06-29 RX ADMIN — ROCURONIUM BROMIDE 20 MG: 10 INJECTION, SOLUTION INTRAVENOUS at 08:45

## 2022-06-29 RX ADMIN — FENTANYL CITRATE 50 MCG: 50 INJECTION, SOLUTION INTRAMUSCULAR; INTRAVENOUS at 08:22

## 2022-06-29 ASSESSMENT — PAIN DESCRIPTION - DESCRIPTORS
DESCRIPTORS: BURNING

## 2022-06-29 ASSESSMENT — PAIN SCALES - GENERAL
PAINLEVEL_OUTOF10: 6
PAINLEVEL_OUTOF10: 7

## 2022-06-29 ASSESSMENT — PAIN DESCRIPTION - LOCATION
LOCATION: OTHER (COMMENT)
LOCATION: PENIS
LOCATION: PENIS

## 2022-06-29 ASSESSMENT — ENCOUNTER SYMPTOMS: SHORTNESS OF BREATH: 0

## 2022-06-29 ASSESSMENT — LIFESTYLE VARIABLES: SMOKING_STATUS: 1

## 2022-06-29 NOTE — ANESTHESIA PRE PROCEDURE
Department of Anesthesiology  Preprocedure Note       Name:  Jessica Christianson   Age:  67 y.o.  :  1950                                          MRN:  [id number]         Date:  2022      Surgeon: Jeff Russell):  Shant Fletcher MD    Procedure: Procedure(s):  CYSTOSCOPY TRANSURETHRAL RESECTION BLADDER TUMOR  BILATERAL RETROGRADE PYELOGRAM  POSSIBLE RIGHT URETERAL STENT INSERTION    Medications prior to admission:   Prior to Admission medications    Not on File       Current medications:    Current Facility-Administered Medications   Medication Dose Route Frequency Provider Last Rate Last Admin    levoFLOXacin (LEVAQUIN) 500 MG/100ML infusion 500 mg  500 mg IntraVENous Once Shant Fletcher MD        lactated ringers infusion   IntraVENous Continuous Shadia Issa  mL/hr at 22 3733 New Bag at 22 [id number]       Allergies:  No Known Allergies    Problem List:    Patient Active Problem List   Diagnosis Code    Cigarette nicotine dependence without complication W77.386    Family history of colonic polyps Z83.71    Chronic low back pain with left-sided sciatica M54.42, G89.29    Colorectal polyps K63.5    Chronic obstructive lung disease ([facility] [address] J44.9    Lung cancer screening declined by patient Z53.20    History of tobacco abuse Z87.891    Compression fracture of L1 vertebra with routine healing S32.010D    Compression fracture of L2 vertebra with routine healing S32.020D       Past Medical History:        Diagnosis Date    Nocturia 2019    Positive colorectal cancer screening using Cologuard test 10/8/2019       Past Surgical History:        Procedure Laterality Date    CATARACT REMOVAL Bilateral     COLONOSCOPY N/A 2019    Dr DAYANA Eddy-Tubulovillous AP, (-) dysplasia x 1, tubular AP, (-) dysplasia x 4, BCM x 1, 3 yr recall    FRACTURE SURGERY Right     shoulder, ball and joint    HUMERUS FRACTURE SURGERY Right     LEG SURGERY Right     femur fracture    SHOULDER SURGERY Right 1965    shoulder fracture, ORIF, football injury       Social History:    Social History     Tobacco Use    Smoking status: Current Every Day Smoker     Packs/day: 1.00     Years: 47.00     Pack years: 47.00     Types: Cigarettes    Smokeless tobacco: Never Used   Substance Use Topics    Alcohol use: Yes     Alcohol/week: 14.0 standard drinks     Types: 14 Cans of beer per week     Comment: 2 beer per night                                Ready to quit: Not Answered  Counseling given: Not Answered      Vital Signs (Current):   Vitals:    06/29/22 0631   BP: (!) 150/71   Pulse: 60   Resp: 18   Temp: 98 °F (36.7 °C)   TempSrc: Temporal   SpO2: 100%   Weight: 187 lb (84.8 kg)   Height: 6' 2\" (1.88 m)                                              BP Readings from Last 3 Encounters:   06/29/22 (!) 150/71   04/29/22 (!) 140/75   03/30/22 120/70       NPO Status: Time of last liquid consumption: 0000                        Time of last solid consumption: 0000                        Date of last liquid consumption: 06/28/22                        Date of last solid food consumption: 06/28/22    BMI:   Wt Readings from Last 3 Encounters:   06/29/22 187 lb (84.8 kg)   06/16/22 188 lb (85.3 kg)   04/29/22 193 lb 9.6 oz (87.8 kg)     Body mass index is 24.01 kg/m².     CBC:   Lab Results   Component Value Date    WBC 6.3 06/23/2022    RBC 4.77 06/23/2022    HGB 15.2 06/23/2022    HCT 47.0 06/23/2022    MCV 98.5 06/23/2022    RDW 13.5 06/23/2022     06/23/2022       CMP:   Lab Results   Component Value Date     06/23/2022    K 4.3 06/23/2022     06/23/2022    CO2 23 06/23/2022    BUN 18 06/23/2022    CREATININE 0.8 06/23/2022    GFRAA >59 06/23/2022    LABGLOM >60 06/23/2022    GLUCOSE 89 06/23/2022    PROT 7.3 06/23/2022    CALCIUM 9.3 06/23/2022    BILITOT 1.6 06/23/2022    ALKPHOS 80 06/23/2022    AST 17 06/23/2022    ALT 10 06/23/2022       POC Tests: No results for input(s): POCGLU, POCNA, POCK, POCCL, POCBUN, POCHEMO, POCHCT in the last 72 hours. Coags:   Lab Results   Component Value Date    PROTIME 13.0 06/23/2022    INR 0.99 06/23/2022    APTT 33.3 06/23/2022       HCG (If Applicable): No results found for: PREGTESTUR, PREGSERUM, HCG, HCGQUANT     ABGs: No results found for: PHART, PO2ART, CCW6QNK, XNX8WDE, BEART, E2PQPICG     Type & Screen (If Applicable):  No results found for: LABABO, LABRH    Drug/Infectious Status (If Applicable):  No results found for: HIV, HEPCAB    COVID-19 Screening (If Applicable):   Lab Results   Component Value Date    COVID19 Not Detected 06/24/2022           Anesthesia Evaluation  Patient summary reviewed and Nursing notes reviewed no history of anesthetic complications:   Airway: Mallampati: II  TM distance: >3 FB   Neck ROM: full  Mouth opening: > = 3 FB   Dental: normal exam   (+) upper dentures and lower dentures      Pulmonary:Negative Pulmonary ROS and normal exam  breath sounds clear to auscultation  (+) COPD:  current smoker    (-) shortness of breath          Patient smoked on day of surgery. Cardiovascular:        (-) hypertension, CAD,  angina and  CHF    NYHA Classification: I  ECG reviewed  Rhythm: regular  Rate: normal           Beta Blocker:  Not on Beta Blocker         Neuro/Psych:   Negative Neuro/Psych ROS  (+) neuromuscular disease:,    (-) seizures, CVA and depression/anxiety            GI/Hepatic/Renal: Neg GI/Hepatic/Renal ROS  (+) liver disease (elevated bili ):,      (-) hiatal hernia and GERD       Endo/Other: Negative Endo/Other ROS   (+) malignancy/cancer. Pt had PAT visit. Abdominal:       Abdomen: soft. Vascular: Other Findings:           Anesthesia Plan      general     ASA 3     (Iv zofran within 30 min of closing )  Induction: intravenous. BIS  MIPS: Postoperative opioids intended and Prophylactic antiemetics administered. Anesthetic plan and risks discussed with patient.     Use of blood products discussed with patient whom. Plan discussed with CRNA.     Attending anesthesiologist reviewed and agrees with Pre Eval content                Mau Izaguirre MD   6/29/2022

## 2022-06-29 NOTE — OP NOTE
may require right ureteral stent placement as well. He understood and agreed to proceed. We also discussed the other risk  including bleeding, infection, postop urinary retention, need to go home  with James catheter, bladder perforation, etc.  He understands and  agrees to proceed. DESCRIPTION OF PROCEDURE:  The patient was brought to the operating  room, underwent general anesthetic. He was placed in the lithotomy  position. His genitalia was prepped and draped in routine sterile  fashion. Time-out was performed and he received preoperative  antibiotic. A 22-Niuean cystoscope sheath was inserted into the meatus. This was  advanced under direct vision. There was a little bit of tortuosity of  the mid penile urethra, but otherwise is normal.  The scope was passed  under direct vision without difficulty. Entering the prostate, he had  some mild lateral lobe prostate enlargement. There was no median lobe. However, between the 6 and 7 o'clock position, there was a papillary  tumor at the bladder neck. This appeared to be superficial on a stalk. Entering the patient's bladder, the bladder was inspected with 30-degree  lens. This shows a large friable tumor involving the right trigone. I  did not identify the right ureteral orifice is because of the size of  the tumor, but it looks like it is probably lateral to the orifice  involving this part of the trigone. There also was papillary tumor  change between the bladder neck and trigone just to the right of midline  and then a single papillary tumor on the interureteric ridge just to the  left of midline. The left ureteral orifice was away from all this and  was completely normal.  On the left side, the posterior dome and the  anterior dome all appeared to be normal.  This tumor on the right side  extended to the posterior dome on the right and to the lateral, almost  to the bladder neck caudad.   It extended almost up the whole lateral  wall and again back down towards the trigone. so this is a very large  greater than 5 cm tumor. I then inspected the bladder with a 70-degree  lens and when I did so, I saw another small little papillary tumor about  12 o'clock right inside in the bladder neck. Otherwise, no other tumors  except for described above were identified when I examined with the  70-degree lens. I switched back to the 30-degree lens and performed a left retrograde  pyelogram.  Under direct visualization, the left ureter was identified  and intubated with 5-South Korean open-ended ureteral catheter, contrast  injected retrograde. This showed a normal-appearing left retrograde  pyelogram.  I also performed a right retrograde pyelogram after the  resection of the bladder tumor. I will describe that now, but the tumor  had been resected and I could identify the orifice. The orifice was not  involved in the resection, but the overlying mucosa over the ureteral  tunnel was resected, but I could identify the orifice after the bladder  tumor had been completely resected. Under direct visualization, this  was also intubated with a 5-[place] Korean open-ended ureteral catheter and  contrast injected retrograde on the right. This showed narrowing at the  UVJ, then the ureter above this showed some mild ureteral dilation. There was no filling defect. The upper collecting system including the  renal pelvis was without hydronephrosis or filling defect. The  retrograde pyelograms will be described as a separate dictated report. As mentioned, I did the left retrograde pyelogram and the right  retrograde pyelogram was done after the resection. The scope was removed. I then used [employer] sounds to calibrate and  dilate the urethra little tight at the fossa navicularis at 28 and  30-South Korean, but I calibrated and dilated the urethra from 22 to 30-South Korean  with [employer] sounds without difficulty.   I then placed a 26-South Korean  continuous flow resectoscope sheath with visual obturator. This was  advanced under direct vision into the patient's bladder. I then  performed transurethral resection of bladder tumor. I first began by resecting the tumors at the bladder neck and the  trigone and then I just fulgurated the little tumor at the 12 o'clock  bladder neck. Once these tumors were removed and resected, I fulgurated  the resection sites, there was no bleeding. The tumor between the  bladder neck and the trigone had also been resected. This just left a  large lateral right-sided tumor. With the resectoscope, I was able to  kind of push the tumor more laterally and actually could see the right  ureteral orifice, it was not involved, it was very close. I then began  resecting the tumor beginning laterally and cephalad and then extending  the resection as I debulked this down to visible muscle until I went  from anterolateral down to posterior. I resected this down to visible  muscle. There were some areas where I thought this looked grossly  invasive, but there were other areas what appeared to be particularly on  the periphery and cephalad portion of the tumor where I was down to  fairly normal-appearing vessel and the tumor looked more superficial.  I  did encounter bleeders, particularly at the central base of the tumor  and these were cauterized with direct cauterization. The estimated  blood loss was probably about 20 mL. It is hard to determine. After I  had completely resected the tumor, I then could identify the right  ureteral orifice and it was not resected, though the intramural tunnel  of the orifice and the mucosa overlying had been resected. This then  allowed me to identify the orifice for retrograde pyelogram.  The [facility]  evacuator was used to evacuate the bladder cancer tissue and this will  be sent for pathologic examination. Just grossly, it is my impression  that it is suspicious for possible invasion, final pathology to be  determined.   I did completely resect all visible papillary bladder tumor  and fulgurated the edges. At the end of the resection, there was good  hemostasis. There was no residual specimen left. The scope was  removed. I looked back in with a 22-East Timorese cystoscope and again identified the  right ureteral orifice and performed retrograde pyelogram as described  above. After pyelogram was performed, a 0.035 sensor tip guidewire was  inserted and this was placed up into the right renal pelvis under  fluoroscopic guidance. A 5-East [place]orese catheter was the inserted over the  guidewire up into the right renal pelvis to measure for the stent. Catheter was removed leaving the guidewire in place. Then, over the  guidewire, a 6-East Timorese x 20 cm right double-J ureteral stent was advanced  under cystoscopic and fluoroscopic guidance. This coiled in the renal  pelvis in good position and coiled in the bladder in good position. At  this point, I carefully inspected the bladder, made sure there was no  other tumors that were visible that were not removed, I did not see any. Again, I felt a complete resection had been accomplished. A 22-[place]orese  three-way James catheter was inserted with 10 mL balloon. This was  placed on continuous bladder irrigation with normal saline. This was  running clear at the termination of the procedure. The patient was then  awakened from his anesthetic and taken to recovery room in stable  condition. He will be on CBI overnight. We plan to leave his stent in  place for about 6 weeks. He will return to see me in the next couple of  weeks to go over path report.         Bev Cotton MD    D: 06/29/2022 11:51:28      T: 06/29/2022 15:12:51     [email or web address]  Job#: [id number]     Doc#: [id number]    CC:

## 2022-06-29 NOTE — INTERVAL H&P NOTE
Update History & Physical    The patient's History and Physical of June 16, 2022 was reviewed with the patient and I examined the patient. There was no change. The surgical site was confirmed by the patient and me. Plan: The risks, benefits, expected outcome, and alternative to the recommended procedure have been discussed with the patient. Patient understands and wants to proceed with the procedure.      Electronically signed by Jody Ly MD on 6/29/2022 at 8:10 AM

## 2022-06-29 NOTE — ANESTHESIA POSTPROCEDURE EVALUATION
Department of Anesthesiology  Postprocedure Note    Patient: Jordon Form  MRN: [id number]  YOB: 1950  Date of evaluation: 6/29/2022      Procedure Summary     Date: 06/29/22 Room / Location: [facility]    Anesthesia Start: 6466 Anesthesia Stop: 1009    Procedures:       CYSTOSCOPY TRANSURETHRAL RESECTION BLADDER TUMOR GREATER THAN 5CM (N/A )      BILATERAL URETERAL CATHETERIZATION BILATERAL RETROGRADE PYELOGRAM (Bilateral )      RIGHT URETERAL STENT INSERTION (Right ) Diagnosis:       Cancer of overlapping sites of bladder ([address]      (Cancer of overlapping sites of bladder ([address] [C67.8])    Surgeons: Breann Tejeda MD Responsible Provider: CARLOS Wall CRNA    Anesthesia Type: general ASA Status: 3          Anesthesia Type: No value filed.     Ernesto Phase I: Ernesto Score: 8    Ernesto Phase II:        Anesthesia Post Evaluation    Patient location during evaluation: PACU  Patient participation: complete - patient participated  Level of consciousness: awake and alert  Pain score: 2  Airway patency: patent  Nausea & Vomiting: no nausea and no vomiting  Complications: no  Cardiovascular status: blood pressure returned to baseline  Respiratory status: acceptable, spontaneous ventilation and room air  Hydration status: stable

## 2022-06-29 NOTE — OP NOTE
[employer] AID OF [facility] JAZMIN Hernandez [address], [address] [facility]                                OPERATIVE REPORT    PATIENT NAME: Letitia Schwab                  :        1950  MED REC NO:   [id number]                              ROOM:       [facility]  ACCOUNT NO:   [de-identified]                           ADMIT DATE: 2022  PROVIDER:     Giovanni Torres MD    DATE OF PROCEDURE:  2022    RADIOGRAPHIC INTERPRETATION OF BILATERAL RETROGRADE PYELOGRAMS    1. Left retrograde pyelogram.    INTERPRETATION:  The left ureter is intubated with ureteral catheter,  contrast injected retrograde. This shows a normal-appearing left ureter  and upper collecting system without filling defects or hydronephrosis. CONCLUSION:  Normal-appearing left retrograde pyelogram.    2.  Right retrograde pyelogram.    INTERPRETATION:  The right ureter is intubated with ureteral catheter,  contrast injected retrograde. This shows some narrowing right at the  distal ureter at the UVJ. Above this, the ureter is mildly dilated,  slightly tortuous. The renal pelvis, however, is not dilated. There is  no hydronephrosis, just the dilated ureter. There are no filling  defects in the course of the right ureter. CONCLUSION:  The right ureteral UVJ narrowing with mild ureterectasis  without significant pelviectasis or hydronephrosis. No filling defects.         Rama Peck MD    D: 2022 11:53:48      T: 2022 13:09:27     [email or web address]  Job#: [id number]     Doc#: [id number]    CC:

## 2022-06-29 NOTE — OP NOTE
Brief operative Note      Patient: Margie Cheng  YOB: 1950  MRN: [id number]    Date of Procedure: 6/29/2022    Pre-Op Diagnosis: Bladder Cancer of overlapping sites of bladder ([facility] [C67.8]    Post-Op Diagnosis: Same       Procedure(s):  CYSTOSCOPY TRANSURETHRAL RESECTION BLADDER TUMOR GREATER THAN 5CM  BILATERAL URETERAL CATHETERIZATION BILATERAL RETROGRADE PYELOGRAM  RIGHT URETERAL STENT INSERTION 6 Guatemalan by 20 cm    Surgeon(s):  Roxie Berman MD    Assistant:   * No surgical staff found *    Anesthesia: General    Estimated Blood Loss (mL): 20    Complications: None    Specimens:   ID Type Source Tests Collected by Time Destination   A : bladder tumor Tissue Bladder SURGICAL PATHOLOGY Roxie Berman MD 6/29/2022 8734        Implants:  Implant Name Type Inv. Item Serial No.  Lot No. LRB No. Used Action   STENT URET L20CM DIA6FR HYDR+ PERCFLX TAPR TIP DBL PGTL - [id number]  STENT URET L20CM DIA6FR HYDR+ PERCFLX TAPR TIP DBL PGTL  [employer] [id number] Right 1 Implanted         Drains:   Urinary Catheter 3 Way (Active)       Findings: Normal left retrograde pyelogram.  Right ureteral orifice adjacent to bladder tumor which was lateral.  Did not have to resect the orifice. Right retrograde showed narrowing at the UVJ and some mild ureteral dilation no filling defect. Right ureteral stent placed after bladder tumor resection 6 Guatemalan by 20 cm. Will remain in for 6 weeks. Papillary bladder tumor multiple overlapping sites at the bladder neck at 630 and smaller papillary tumor at 12:00, between the bladder neck and trigone, mid trigone, and large greater than 5 cm solid tumor involving the right trigone laterally and right lateral wall. This was completely resected down to visible muscle. ?? Possible gross muscle invasion??  22 Guatemalan three-way James with 10 cc balloon to CBI.     Detailed Description of Procedure:   See dictated report:  [id number]    Disposition to PACU then to [facility] bed fifth floor preferred on CBI. If CBI clear overnight remove James catheter in a.m.     Electronically signed by Alma Diane MD on 6/29/2022 at 10:26 AM

## 2022-06-29 NOTE — PROGRESS NOTES
4 Eyes Skin Assessment    Peggy Hi is being assessed upon: Transfer to New Unit    I agree that Viv Rice RN, along with Yesica Sheridan RN (either 2 RN's or 1 LPN and 1 RN) have performed a thorough Head to Toe Skin Assessment on the patient. ALL assessment sites listed below have been assessed. Areas assessed by both nurses:     [x]   Head, Face, and Ears   [x]   Shoulders, Back, and Chest  [x]   Arms, Elbows, and Hands   [x]   Coccyx, Sacrum, and Ischium  [x]   Legs, Feet, and Heels    Does the Patient have Skin Breakdown?  No    Ron Prevention initiated: NA  Wound Care Orders initiated: NA    WOC nurse consulted for Pressure Injury (Stage 3,4, Unstageable, DTI, NWPT, and Complex wounds) and New or Established Ostomies: NA        Primary Nurse eSignature: LUZ MARIA Allison on 6/29/2022 at 5:21 PM      Co-Signer eSignature: Electronically signed by Yesica Sheridan RN on 6/30/22 at 8:36 AM CDT

## 2022-06-30 VITALS
OXYGEN SATURATION: 95 % | BODY MASS INDEX: 24 KG/M2 | SYSTOLIC BLOOD PRESSURE: 144 MMHG | RESPIRATION RATE: 16 BRPM | HEART RATE: 70 BPM | TEMPERATURE: 97.6 F | WEIGHT: 187 LBS | DIASTOLIC BLOOD PRESSURE: 74 MMHG | HEIGHT: 74 IN

## 2022-06-30 LAB
ANION GAP SERPL CALCULATED.3IONS-SCNC: 11 MMOL/L (ref 7–19)
BASOPHILS ABSOLUTE: 0 K/UL (ref 0–0.2)
BASOPHILS RELATIVE PERCENT: 0.1 % (ref 0–1)
BUN BLDV-MCNC: 14 MG/DL (ref 8–23)
CALCIUM SERPL-MCNC: 8.9 MG/DL (ref 8.8–10.2)
CHLORIDE BLD-SCNC: 105 MMOL/L (ref 98–111)
CO2: 21 MMOL/L (ref 22–29)
CREAT SERPL-MCNC: 0.7 MG/DL (ref 0.5–1.2)
EOSINOPHILS ABSOLUTE: 0 K/UL (ref 0–0.6)
EOSINOPHILS RELATIVE PERCENT: 0 % (ref 0–5)
GFR AFRICAN AMERICAN: >59
GFR NON-AFRICAN AMERICAN: >60
GLUCOSE BLD-MCNC: 117 MG/DL (ref 74–109)
HCT VFR BLD CALC: 42.8 % (ref 42–52)
HEMOGLOBIN: 14.3 G/DL (ref 14–18)
IMMATURE GRANULOCYTES #: 0.1 K/UL
LYMPHOCYTES ABSOLUTE: 0.9 K/UL (ref 1.1–4.5)
LYMPHOCYTES RELATIVE PERCENT: 6.3 % (ref 20–40)
MCH RBC QN AUTO: 32.4 PG (ref 27–31)
MCHC RBC AUTO-ENTMCNC: 33.4 G/DL (ref 33–37)
MCV RBC AUTO: 97.1 FL (ref 80–94)
MONOCYTES ABSOLUTE: 0.9 K/UL (ref 0–0.9)
MONOCYTES RELATIVE PERCENT: 6.5 % (ref 0–10)
NEUTROPHILS ABSOLUTE: 12.3 K/UL (ref 1.5–7.5)
NEUTROPHILS RELATIVE PERCENT: 86.5 % (ref 50–65)
PDW BLD-RTO: 13.2 % (ref 11.5–14.5)
PLATELET # BLD: 194 K/UL (ref 130–400)
PMV BLD AUTO: 11.3 FL (ref 9.4–12.4)
POTASSIUM REFLEX MAGNESIUM: 4.5 MMOL/L (ref 3.5–5)
RBC # BLD: 4.41 M/UL (ref 4.7–6.1)
SODIUM BLD-SCNC: 137 MMOL/L (ref 136–145)
WBC # BLD: 14.3 K/UL (ref 4.8–10.8)

## 2022-06-30 PROCEDURE — 6360000002 HC RX W HCPCS: Performed by: UROLOGY

## 2022-06-30 PROCEDURE — 80048 BASIC METABOLIC PNL TOTAL CA: CPT

## 2022-06-30 PROCEDURE — 85025 COMPLETE CBC W/AUTO DIFF WBC: CPT

## 2022-06-30 PROCEDURE — 6370000000 HC RX 637 (ALT 250 FOR IP): Performed by: UROLOGY

## 2022-06-30 PROCEDURE — 96374 THER/PROPH/DIAG INJ IV PUSH: CPT

## 2022-06-30 PROCEDURE — 99238 HOSP IP/OBS DSCHRG MGMT 30/<: CPT | Performed by: NURSE PRACTITIONER

## 2022-06-30 PROCEDURE — 2580000003 HC RX 258: Performed by: UROLOGY

## 2022-06-30 PROCEDURE — 96375 TX/PRO/DX INJ NEW DRUG ADDON: CPT

## 2022-06-30 PROCEDURE — 36415 COLL VENOUS BLD VENIPUNCTURE: CPT

## 2022-06-30 PROCEDURE — G0378 HOSPITAL OBSERVATION PER HR: HCPCS

## 2022-06-30 RX ORDER — CIPROFLOXACIN 500 MG/1
500 TABLET, FILM COATED ORAL 2 TIMES DAILY
Qty: 10 TABLET | Refills: 0 | Status: SHIPPED | OUTPATIENT
Start: 2022-06-30 | End: 2022-07-05

## 2022-06-30 RX ORDER — HYDROCODONE BITARTRATE AND ACETAMINOPHEN 5; 325 MG/1; MG/1
1 TABLET ORAL EVERY 6 HOURS PRN
Qty: 12 TABLET | Refills: 0 | Status: SHIPPED | OUTPATIENT
Start: 2022-06-30 | End: 2022-07-03

## 2022-06-30 RX ADMIN — SODIUM CHLORIDE: 9 INJECTION, SOLUTION INTRAVENOUS at 05:38

## 2022-06-30 RX ADMIN — ONDANSETRON HYDROCHLORIDE 4 MG: 2 SOLUTION INTRAMUSCULAR; INTRAVENOUS at 08:46

## 2022-06-30 RX ADMIN — HYDROCODONE BITARTRATE AND ACETAMINOPHEN 2 TABLET: 5; 325 TABLET ORAL at 08:45

## 2022-06-30 RX ADMIN — HYDROMORPHONE HYDROCHLORIDE 0.25 MG: 1 INJECTION, SOLUTION INTRAMUSCULAR; INTRAVENOUS; SUBCUTANEOUS at 07:47

## 2022-06-30 RX ADMIN — CIPROFLOXACIN 400 MG: 2 INJECTION, SOLUTION INTRAVENOUS at 08:19

## 2022-06-30 ASSESSMENT — PAIN DESCRIPTION - LOCATION
LOCATION: ABDOMEN
LOCATION: PENIS

## 2022-06-30 ASSESSMENT — PAIN SCALES - GENERAL
PAINLEVEL_OUTOF10: 8
PAINLEVEL_OUTOF10: 6

## 2022-06-30 ASSESSMENT — PAIN DESCRIPTION - ORIENTATION: ORIENTATION: LOWER

## 2022-06-30 ASSESSMENT — PAIN DESCRIPTION - DESCRIPTORS: DESCRIPTORS: CRAMPING;DISCOMFORT

## 2022-06-30 NOTE — PLAN OF CARE
Problem: Discharge Planning  Goal: Discharge to home or other facility with appropriate resources  6/30/2022 1246 by Madhav Rodrigez RN  Outcome: Completed  6/30/2022 0415 by Mateusz Hart RN  Outcome: Progressing  Flowsheets (Taken 6/29/2022 2234)  Discharge to home or other facility with appropriate resources: Identify barriers to discharge with patient and caregiver     Problem: Pain  Goal: Verbalizes/displays adequate comfort level or baseline comfort level  6/30/2022 1246 by Madhav Rodrigez RN  Outcome: Completed  6/30/2022 0415 by Mateusz Hart RN  Outcome: Progressing     Problem: ABCDS Injury Assessment  Goal: Absence of physical injury  6/30/2022 1246 by Madhav Rodrigez RN  Outcome: Completed  6/30/2022 0415 by Mateusz Hart RN  Outcome: Progressing

## 2022-06-30 NOTE — DISCHARGE SUMMARY
Discharge Summary     Date:6/30/2022        Patient Obed Rocha     YOB: 1950     Age:72 y.o. Admit Date:6/29/2022   Admission Condition:good   Discharged Condition:good  Discharge Date: 06/30/22     Discharge Diagnoses   Principal Problem:    Bladder cancer [facility])  Active Problems:    Malignant neoplasm of overlapping sites of bladder [facility])  Resolved Problems:    * No resolved hospital problems. [facility] Stay   Narrative of Hospital Course:  Patient 70-year-old male who originally underwent evaluation of gross hematuria was found to have a bladder mass. Therefore he presented for surgical intervention and underwent cystoscopy, bilateral ureteral catheterization for retrograde pyelograms and transurethral resection of this bladder tumor with right ureteral stent placement on 6/29/2022. He has done well overnight. He denies any pain. CBI was weaned overnight. Urine appears clear yellow in Norris bag no hematuria. Patient has remained afebrile overnight, hemodynamically stable. Will discontinue norris catheter this morning and start 3 bottle routine. If urine remains clear can be discharged home. Will send with Cipro PO x5 days. Will need to keep stent for at least 6 weeks. Will follow up with Dr. Brian Apodaca in office 2 weeks to review pathology. Patient urinating well, strawberry lemonade in color. No clots. We will have patient follow-up with Dr. Brian Apodaca in 2 weeks. Consultants:   None    Time Spent on Discharge:  30 minutes were spent in patient examination, evaluation, counseling as well as medication reconciliation, prescriptions for required medications, discharge plan and follow up.       Surgeries/Procedures Performed:  Procedure(s):  CYSTOSCOPY TRANSURETHRAL RESECTION BLADDER TUMOR GREATER THAN 5CM  BILATERAL URETERAL CATHETERIZATION BILATERAL RETROGRADE PYELOGRAM  RIGHT URETERAL STENT INSERTION      Treatments:   IV hydration, antibiotics: Cipro, analgesia: Norco and surgery: Cystoscopy transurethral resection bladder tumor greater than 5 cm, bilateral ureteral catheterization bilateral retrograde pyelogram right ureteral stent insertion. Significant Diagnostic Studies:   Recent Labs:  CBC:   Lab Results   Component Value Date/Time    WBC 14.3 06/30/2022 02:32 AM    RBC 4.41 06/30/2022 02:32 AM    HGB 14.3 06/30/2022 02:32 AM    HCT 42.8 06/30/2022 02:32 AM    MCV 97.1 06/30/2022 02:32 AM    MCH 32.4 06/30/2022 02:32 AM    MCHC 33.4 06/30/2022 02:32 AM    RDW 13.2 06/30/2022 02:32 AM     06/30/2022 02:32 AM     CMP:    Lab Results   Component Value Date/Time    GLUCOSE 117 06/30/2022 02:32 AM     06/30/2022 02:32 AM    K 4.5 06/30/2022 02:32 AM     06/30/2022 02:32 AM    CO2 21 06/30/2022 02:32 AM    BUN 14 06/30/2022 02:32 AM    CREATININE 0.7 06/30/2022 02:32 AM    ANIONGAP 11 06/30/2022 02:32 AM    ALKPHOS 80 06/23/2022 09:10 AM    ALT 10 06/23/2022 09:10 AM    AST 17 06/23/2022 09:10 AM    BILITOT 1.6 06/23/2022 09:10 AM    LABALBU 4.4 06/23/2022 09:10 AM    LABGLOM >60 06/30/2022 02:32 AM    GFRAA >59 06/30/2022 02:32 AM    PROT 7.3 06/23/2022 09:10 AM    CALCIUM 8.9 06/30/2022 02:32 AM       Radiology Last 7 Days:  XR CHEST (2 VW)    Result Date: 6/23/2022  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 Chest otherwise unremarkable Recommendation: Follow up as clinically indicated.  Electronically Signed by Robert Oviedo MD at 23-Jun-2022 11:53:25 AM               Discharge Plan   Disposition: Home    Provider Follow-Up:   Ac Negrete MD  [address] [place] [address] [phone]    In 2 weeks  Hospital f/u       Hospital/Incidental Findings Requiring Follow-Up:  Follow up 2 wks with Dr. Xiomara Chris    Patient Instructions   Diet: regular diet    Activity: activity as tolerated    Other Instructions:   If increase in bleeding, rest and increase fluids     Discharge Medications         Medication List      START taking these medications    ciprofloxacin 500 MG tablet  Commonly known as: CIPRO  Take 1 tablet by mouth 2 times daily for 5 days     HYDROcodone-acetaminophen 5-325 MG per tablet  Commonly known as: NORCO  Take 1 tablet by mouth every 6 hours as needed for Pain for up to 3 days.            Where to Get Your Medications      These medications were sent to [address] [place] [address] [place], [place] [address]    Phone: [phone]   · ciprofloxacin 500 MG tablet  · HYDROcodone-acetaminophen 5-325 MG per tablet         Electronically signed by CARLOS Rutledge CNP on 6/30/22 at 7:59 AM CDT

## 2022-07-01 ENCOUNTER — TELEPHONE (OUTPATIENT)
Dept: INTERNAL MEDICINE | Age: 72
End: 2022-07-01

## 2022-07-01 NOTE — TELEPHONE ENCOUNTER
[facility] Initial Follow Up Call    Outreach made within 2 business days of discharge: Yes    Patient: Bianca Oviedo   Patient : 1950 MRN: [id number]    Reason for Admission: Hematuria  Discharge Date: 22       Spoke with: Luh    Discharge department/facility: [facility]    Discharge Diagnoses   Principal Problem:    Bladder cancer [facility])  Active Problems:    Malignant neoplasm of overlapping sites of bladder [facility])  Resolved Problems:    * No resolved hospital problems    TCM Interactive Patient Contact:  Was patient able to fill all prescriptions: Yes  Was patient instructed to bring all medications to the follow-up visit: Yes  Is patient taking all medications as directed in the discharge summary? Yes  Does patient understand their discharge instructions: Yes  Does patient have questions or concerns that need addressed prior to 7-14 day follow up office visit: no    Spoke to Renetta she states he is doing well and he is now peeing clear. He is still having pain on his right side. He is eating well and over all doing well. I have him scheduled for 22 and they know to call us if they need anything before that apt.     Scheduled appointment with PCP within 7-14 days    Follow Up  Future Appointments   Date Time Provider Yuval Pereyra   2022  9:15 AM MD KOSTAS Burnett [facility]   3/31/2023  9:00 AM MD FELIBERTO Tsang [facility]       [place], [place]

## 2022-07-06 NOTE — TELEPHONE ENCOUNTER
I would prefer to see him myself next week if we have a cancellation somewhere for his hospital follow up

## 2022-07-11 NOTE — TELEPHONE ENCOUNTER
appt has been changed and wife was notified due to Enrique Peña not being in the house. She verbalized understanding and said they will be here.

## 2022-07-12 ENCOUNTER — OFFICE VISIT (OUTPATIENT)
Dept: FAMILY MEDICINE CLINIC | Age: 72
End: 2022-07-12
Payer: MEDICARE

## 2022-07-12 VITALS
SYSTOLIC BLOOD PRESSURE: 122 MMHG | DIASTOLIC BLOOD PRESSURE: 76 MMHG | OXYGEN SATURATION: 98 % | HEIGHT: 75 IN | BODY MASS INDEX: 22.78 KG/M2 | HEART RATE: 73 BPM | TEMPERATURE: 97.7 F | WEIGHT: 183.25 LBS

## 2022-07-12 DIAGNOSIS — R35.0 URINARY FREQUENCY: ICD-10-CM

## 2022-07-12 DIAGNOSIS — R30.0 DYSURIA: ICD-10-CM

## 2022-07-12 DIAGNOSIS — Z09 HOSPITAL DISCHARGE FOLLOW-UP: Primary | ICD-10-CM

## 2022-07-12 DIAGNOSIS — C67.0 MALIGNANT NEOPLASM OF TRIGONE OF URINARY BLADDER (HCC): ICD-10-CM

## 2022-07-12 DIAGNOSIS — N30.01 ACUTE CYSTITIS WITH HEMATURIA: ICD-10-CM

## 2022-07-12 LAB
APPEARANCE FLUID: ABNORMAL
BILIRUBIN, POC: ABNORMAL
BLOOD URINE, POC: ABNORMAL
CLARITY, POC: ABNORMAL
COLOR, POC: ABNORMAL
GLUCOSE URINE, POC: ABNORMAL
KETONES, POC: ABNORMAL
LEUKOCYTE EST, POC: ABNORMAL
NITRITE, POC: ABNORMAL
PH, POC: 7
PROTEIN, POC: >300
SPECIFIC GRAVITY, POC: >1.03
UROBILINOGEN, POC: 0.2

## 2022-07-12 PROCEDURE — 1111F DSCHRG MED/CURRENT MED MERGE: CPT | Performed by: INTERNAL MEDICINE

## 2022-07-12 PROCEDURE — 99495 TRANSJ CARE MGMT MOD F2F 14D: CPT | Performed by: INTERNAL MEDICINE

## 2022-07-12 PROCEDURE — 81002 URINALYSIS NONAUTO W/O SCOPE: CPT | Performed by: INTERNAL MEDICINE

## 2022-07-12 RX ORDER — SULFAMETHOXAZOLE AND TRIMETHOPRIM 800; 160 MG/1; MG/1
1 TABLET ORAL 2 TIMES DAILY
Qty: 20 TABLET | Refills: 0 | Status: SHIPPED | OUTPATIENT
Start: 2022-07-12 | End: 2022-07-22

## 2022-07-12 RX ORDER — PHENAZOPYRIDINE HYDROCHLORIDE 200 MG/1
200 TABLET, FILM COATED ORAL 3 TIMES DAILY PRN
Qty: 9 TABLET | Refills: 0 | Status: SHIPPED | OUTPATIENT
Start: 2022-07-12 | End: 2022-07-15

## 2022-07-12 ASSESSMENT — ENCOUNTER SYMPTOMS
EYE REDNESS: 0
WHEEZING: 0
SHORTNESS OF BREATH: 0
SINUS PRESSURE: 0
DIARRHEA: 0
VOMITING: 0
SORE THROAT: 0
VOICE CHANGE: 0
COUGH: 0
COLOR CHANGE: 0
ABDOMINAL PAIN: 0
BACK PAIN: 1
EYE DISCHARGE: 0
CHEST TIGHTNESS: 0
BLOOD IN STOOL: 0
RHINORRHEA: 0
EYE PAIN: 0

## 2022-07-12 NOTE — PROGRESS NOTES
Post-Discharge Transitional Care Follow Up      Susie Robertson   YOB: 1950    Date of Office Visit:  7/12/2022  Date of Hospital Admission: 6/29/22  Date of Hospital Discharge: 6/30/22  Readmission Risk Score (high >=14%. Medium >=10%):No data recorded    Care management risk score Rising risk (score 2-5) and Complex Care (Scores >=6): 1     Non face to face  following discharge, date last encounter closed (first attempt may have been earlier): 7/1/2022 10:02 AM     Call initiated 2 business days of discharge: Yes     Hospital discharge follow-up  -     MS DISCHARGE MEDS RECONCILED W/ CURRENT OUTPATIENT MED LIST  Malignant neoplasm of trigone of urinary bladder ([address]  -     MS DISCHARGE MEDS RECONCILED W/ CURRENT OUTPATIENT MED LIST  Dysuria  -     POCT Urinalysis no Micro  -     MS DISCHARGE MEDS RECONCILED W/ CURRENT OUTPATIENT MED LIST  -     phenazopyridine (PYRIDIUM) 200 MG tablet; Take 1 tablet by mouth 3 times daily as needed for Pain, Disp-9 tablet, R-0Normal  -     Culture, Urine; Future  Urinary frequency  -     POCT Urinalysis no Micro  -     MS DISCHARGE MEDS RECONCILED W/ CURRENT OUTPATIENT MED LIST  Acute cystitis with hematuria  -     sulfamethoxazole-trimethoprim (BACTRIM DS;SEPTRA DS) 800-160 MG per tablet; Take 1 tablet by mouth 2 times daily for 10 days, Disp-20 tablet, R-0Normal  -     Culture, Urine; Future      Medical Decision Making: moderate complexity  Return if symptoms worsen or fail to improve. On this date 7/12/2022 I have spent 30 minutes reviewing previous notes, test results and face to face with the patient discussing the diagnosis and importance of compliance with the treatment plan as well as documenting on the day of the visit. Subjective:   HPI    Inpatient course: Discharge summary reviewed- see chart.     Interval history/Current status:     Patient had cytoscopy with removal of 5 cm bladder mass/bladder cancer of trigone of bladder and right ureteral stent placed by urology on June 29th and was d/c the following day. He is still having right flank pain with burning and stinging with urination but hematuria is almost totally resolved/minimal blood (90% resolved per patient). He still has a few of his pain pills left from Dr Johana Powell that were prescribed at hospital discharge. No fever, nausea, or vomiting. Appointment with Urology is in 2 days. He was given 5 days of ciprofloxacin which he completed after hospital discharge. He has had increased urination and increased nocturia. Steam force varies. Patient Active Problem List   Diagnosis    Cigarette nicotine dependence without complication    Family history of colonic polyps    Chronic low back pain with left-sided sciatica    Colorectal polyps    Chronic obstructive lung disease ([facility] [address]    Lung cancer screening declined by patient    History of tobacco abuse    Compression fracture of L1 vertebra with routine healing    Compression fracture of L2 vertebra with routine healing    Malignant neoplasm of overlapping sites of bladder ([facility] [address] [facility]    Bladder cancer ([facility] [address]       Medications listed as ordered at the time of discharge from hospital     Medication List          Accurate as of July 12, 2022 12:58 PM. If you have any questions, ask your nurse or doctor.             START taking these medications    phenazopyridine 200 MG tablet  Commonly known as: Pyridium  Take 1 tablet by mouth 3 times daily as needed for Pain  Started by: Christina Espinosa MD     sulfamethoxazole-trimethoprim 800-160 MG per tablet  Commonly known as: BACTRIM DS;SEPTRA DS  Take 1 tablet by mouth 2 times daily for 10 days  Started by: Christina Espinosa MD           Where to Get Your Medications      These medications were sent to [address] [place], [place] [address]    Phone: [phone]   · phenazopyridine 200 MG tablet  · sulfamethoxazole-trimethoprim 800-160 MG per tablet          Medications marked \"taking\" at this time  Outpatient Medications Marked as Taking for the 7/12/22 encounter (Office Visit) with Christina Espinosa MD   Medication Sig Dispense Refill    sulfamethoxazole-trimethoprim (BACTRIM DS;SEPTRA DS) 800-160 MG per tablet Take 1 tablet by mouth 2 times daily for 10 days 20 tablet 0    phenazopyridine (PYRIDIUM) 200 MG tablet Take 1 tablet by mouth 3 times daily as needed for Pain 9 tablet 0        Medications patient taking as of now reconciled against medications ordered at time of hospital discharge: Yes    Review of Systems   Constitutional: Negative for appetite change, chills, fatigue and fever. HENT: Negative for ear pain, rhinorrhea, sinus pressure, sore throat and voice change. Eyes: Negative for pain, discharge and redness. Respiratory: Negative for cough, chest tightness, shortness of breath and wheezing. Cardiovascular: Negative for chest pain and palpitations. Gastrointestinal: Negative for abdominal pain, blood in stool, diarrhea and vomiting. Endocrine: Negative for cold intolerance, heat intolerance and polydipsia. Genitourinary: Positive for dysuria, flank pain, frequency and hematuria. Negative for penile swelling, scrotal swelling and testicular pain. See HPI   Musculoskeletal: Positive for back pain. Negative for arthralgias, myalgias, neck pain and neck stiffness. Skin: Negative for color change and rash. Neurological: Negative for dizziness, tremors, syncope, speech difficulty, weakness, numbness and headaches. Hematological: Negative for adenopathy. Does not bruise/bleed easily. Psychiatric/Behavioral: Negative for confusion, dysphoric mood and sleep disturbance. The patient is not nervous/anxious. All other systems reviewed and are negative.       Objective:    /76   Pulse 73   Temp 97.7 °F (36.5 °C)   Ht 6' 3\" (1.905 m)   Wt 183 lb 4 oz (83.1 kg)   SpO2 98%   BMI 22.90 kg/m²   Physical Exam  Vitals reviewed. Constitutional:       General: He is not in acute distress. Appearance: Normal appearance. He is well-developed and normal weight. He is not ill-appearing or toxic-appearing. HENT:      Head: Normocephalic and atraumatic. Right Ear: External ear normal.      Left Ear: External ear normal.      Nose: Nose normal.      Mouth/Throat:      Lips: Pink. Mouth: Mucous membranes are moist.   Eyes:      General:         Right eye: No discharge. Left eye: No discharge. Conjunctiva/sclera: Conjunctivae normal.      Pupils: Pupils are equal, round, and reactive to light. Neck:      Thyroid: No thyromegaly. Vascular: Normal carotid pulses. No carotid bruit or JVD. Trachea: Trachea and phonation normal. No tracheal tenderness. Cardiovascular:      Rate and Rhythm: Normal rate and regular rhythm. Pulses:           Carotid pulses are 2+ on the right side and 2+ on the left side. Posterior tibial pulses are 2+ on the right side and 2+ on the left side. Heart sounds: Normal heart sounds. No murmur heard. No friction rub. No gallop. Pulmonary:      Effort: Pulmonary effort is normal. No accessory muscle usage. Breath sounds: Normal breath sounds. No decreased breath sounds, wheezing, rhonchi or rales. Chest:   Breasts:      Right: No supraclavicular adenopathy. Left: No supraclavicular adenopathy. Abdominal:      General: Abdomen is flat. Bowel sounds are normal. There is no distension. Palpations: Abdomen is soft. There is no hepatomegaly, splenomegaly or mass. Tenderness: There is no abdominal tenderness. There is no guarding or rebound. Hernia: A hernia is present. Hernia is present in the umbilical area. Musculoskeletal:         General: No swelling, tenderness or deformity.       Right wrist: Normal.      Left wrist: Normal.      Cervical back: Normal range of motion and neck supple. No rigidity. No muscular tenderness. Right lower leg: No edema. Left lower leg: No edema. Right ankle: Normal.      Left ankle: Normal.      Comments: +mild right CVAT   Lymphadenopathy:      Cervical: No cervical adenopathy. Upper Body:      Right upper body: No supraclavicular adenopathy. Left upper body: No supraclavicular adenopathy. Skin:     General: Skin is warm. Capillary Refill: Capillary refill takes less than 2 seconds. Coloration: Skin is not cyanotic. Findings: No rash. Nails: There is no clubbing. Neurological:      Mental Status: He is alert and oriented to person, place, and time. Cranial Nerves: No cranial nerve deficit or dysarthria. Motor: No weakness, tremor or abnormal muscle tone. Coordination: Coordination normal.      Gait: Gait is intact. Comments: CN II-XII grossly intact, speech clear, no facial droop, MAEW   Psychiatric:         Attention and Perception: Attention and perception normal.         Mood and Affect: Mood and affect normal.         Speech: Speech normal.         Behavior: Behavior normal. Behavior is cooperative. Thought Content: Thought content normal.         Cognition and Memory: Cognition and memory normal.         Judgment: Judgment normal.       POCT UA:  Specific gravity >1.030  Large Blood  Large Leukocytes  Nitrite negative    An electronic signature was used to authenticate this note.   --Bessie Canavan, MD

## 2022-07-14 ENCOUNTER — OFFICE VISIT (OUTPATIENT)
Dept: UROLOGY | Age: 72
End: 2022-07-14
Payer: MEDICARE

## 2022-07-14 VITALS
TEMPERATURE: 97.5 F | OXYGEN SATURATION: 96 % | DIASTOLIC BLOOD PRESSURE: 78 MMHG | BODY MASS INDEX: 22.88 KG/M2 | SYSTOLIC BLOOD PRESSURE: 138 MMHG | HEIGHT: 75 IN | WEIGHT: 184 LBS | HEART RATE: 66 BPM

## 2022-07-14 DIAGNOSIS — C67.8 MALIGNANT NEOPLASM OF OVERLAPPING SITES OF BLADDER (HCC): Primary | ICD-10-CM

## 2022-07-14 LAB — URINE CULTURE, ROUTINE: NORMAL

## 2022-07-14 PROCEDURE — 1123F ACP DISCUSS/DSCN MKR DOCD: CPT | Performed by: UROLOGY

## 2022-07-14 PROCEDURE — 99215 OFFICE O/P EST HI 40 MIN: CPT | Performed by: UROLOGY

## 2022-07-14 PROCEDURE — 51798 US URINE CAPACITY MEASURE: CPT | Performed by: UROLOGY

## 2022-07-14 ASSESSMENT — ENCOUNTER SYMPTOMS
SORE THROAT: 0
WHEEZING: 0
EYE DISCHARGE: 0
VOMITING: 0
NAUSEA: 0
BACK PAIN: 0
EYE REDNESS: 0
CHEST TIGHTNESS: 0
FACIAL SWELLING: 0

## 2022-07-14 NOTE — PROGRESS NOTES
Christina Andrews is a 67 y.o. male who presents today   Chief Complaint   Patient presents with    Follow-up     I am here today for a 2 week fu. Bladder cancer:  Patient was initially diagnosed with bladder cancer on 6/29/2022. This was found in the context of him presenting with gross hematuria CT scan showed a sessile mass of on the right side of his bladder. CT was done on 5/6/2022. Cystoscopy confirmed a bladder tumor. He then underwent TURBT on 6/29/2022. Patient's  Bladder cancer is characterized as Muscle invasive. Bladder cancer stage II - T2, N0, M0<br>II - T2a, N0, M0<br>II - T2b, N0, M0  Prior  treatment TURBT done 6/29/2022. He comes in today discussed path report. His right trigone was resected therefore he has a right ureteral stent which was placed at the time of surgery. This remains in place. . Current symptoms include urgency, frequency, hematuria, dysuria. He was sent home on 5 days of Cipro he then saw his PCP on 7/12/2022 who put him on a course of Bactrim for UTI but the urine culture was no growth. Imaging of upper tracts approximately 2 week(s) ago was bilateral retrograde pyelograms done at the time of his tiered PT on 6/29/2022. CT urogram for hematuria work-up done on 5/6/2022 also showed normal upper tracts no evidence of any metastasis or gross extension at that time.           Past Medical History:   Diagnosis Date    COPD (chronic obstructive pulmonary disease) ([facility] [address]     Nocturia 6/27/2019    Positive colorectal cancer screening using Cologuard test 10/8/2019       Past Surgical History:   Procedure Laterality Date    BLADDER TUMOR EXCISION      CATARACT REMOVAL Bilateral     COLONOSCOPY N/A 11/7/2019    Dr DAYANA Eddy-Tubulovillous AP, (-) dysplasia x 1, tubular AP, (-) dysplasia x 4, BCM x 1, 3 yr recall    CYSTOSCOPY N/A 6/29/2022    CYSTOSCOPY TRANSURETHRAL RESECTION BLADDER TUMOR GREATER THAN 5CM performed by Sofia Munguia MD at [facility] Bilateral 6/29/2022    BILATERAL URETERAL CATHETERIZATION BILATERAL RETROGRADE PYELOGRAM performed by Sandra Noonan MD at [facility] Right 6/29/2022    RIGHT URETERAL STENT INSERTION performed by Sandra Noonan MD at [address] [place] [address] Right     shoulder, ball and joint    HUMERUS FRACTURE SURGERY Right     LEG SURGERY Right     femur fracture    SHOULDER SURGERY Right 1965    shoulder fracture, ORIF, football injury       Current Outpatient Medications   Medication Sig Dispense Refill    sulfamethoxazole-trimethoprim (BACTRIM DS;SEPTRA DS) 800-160 MG per tablet Take 1 tablet by mouth 2 times daily for 10 days 20 tablet 0    phenazopyridine (PYRIDIUM) 200 MG tablet Take 1 tablet by mouth 3 times daily as needed for Pain 9 tablet 0     No current facility-administered medications for this visit. No Known Allergies    Social History     Socioeconomic History    Marital status:      Spouse name: None    Number of children: None    Years of education: None    Highest education level: None   Occupational History    None   Tobacco Use    Smoking status: Current Every Day Smoker     Packs/day: 1.00     Years: 47.00     Pack years: 47.00     Types: Cigarettes    Smokeless tobacco: Never Used   Vaping Use    Vaping Use: Never used   Substance and Sexual Activity    Alcohol use: Yes     Alcohol/week: 14.0 standard drinks     Types: 14 Cans of beer per week     Comment: 2 beer per night    Drug use: Never    Sexual activity: None   Other Topics Concern    None   Social History Narrative    None     Social Determinants of Health     Financial Resource Strain:     Difficulty of Paying Living Expenses: Not on file   Food Insecurity:     Worried About Running Out of Food in the Last Year: Not on file    Silvestre of Food in the Last Year: Not on file   Transportation Needs:     Lack of Transportation (Medical): Not on file    Lack of Transportation (Non-Medical):  Not on file   Physical Activity: Sufficiently Active    Days of Exercise per Week: 7 days    Minutes of Exercise per Session: 60 min   Stress:     Feeling of Stress : Not on file   Social Connections:     Frequency of Communication with Friends and Family: Not on file    Frequency of Social Gatherings with Friends and Family: Not on file    Attends [employer]ology Services: Not on file    Active Member of Clubs or Organizations: Not on file    Attends Club or Organization Meetings: Not on file    Marital Status: Not on file   Intimate Partner Violence:     Fear of Current or Ex-Partner: Not on file    Emotionally Abused: Not on file    Physically Abused: Not on file    Sexually Abused: Not on file   Housing Stability:     Unable to Pay for Housing in the Last Year: Not on file    Number of Places Lived in the Last Year: Not on file    Unstable Housing in the Last Year: Not on file       Family History   Problem Relation Age of Onset    Diabetes Mother     Colon Polyps Mother     Lung Cancer Father     No Known Problems Sister     Other Brother         disability with back    No Known Problems Maternal Grandmother     Heart Disease Maternal Grandfather     No Known Problems Paternal Grandmother     No Known Problems Paternal Grandfather     Pancreatic Cancer Daughter     Diabetes type 2  Daughter     Thyroid Disease Daughter     High Cholesterol Daughter     Colon Cancer Neg Hx     Esophageal Cancer Neg Hx     Liver Cancer Neg Hx     Liver Disease Neg Hx     Rectal Cancer Neg Hx     Stomach Cancer Neg Hx        REVIEW OF SYSTEMS:  Review of Systems   Constitutional: Negative for chills and fever. HENT: Negative for facial swelling and sore throat. Eyes: Negative for discharge and redness. Respiratory: Negative for chest tightness and wheezing. Cardiovascular: Negative for chest pain and palpitations. Gastrointestinal: Negative for nausea and vomiting.    Endocrine: Negative for Date/Time    WBC 14.3 2022 02:32 AM    RBC 4.41 2022 02:32 AM    HGB 14.3 2022 02:32 AM    HCT 42.8 2022 02:32 AM    MCV 97.1 2022 02:32 AM    MCH 32.4 2022 02:32 AM    MCHC 33.4 2022 02:32 AM    RDW 13.2 2022 02:32 AM     2022 02:32 AM    MPV 11.3 2022 02:32 AM     CMP:    Lab Results   Component Value Date/Time     2022 02:32 AM    K 4.5 2022 02:32 AM     2022 02:32 AM    CO2 21 2022 02:32 AM    BUN 14 2022 02:32 AM    CREATININE 0.7 2022 02:32 AM    GFRAA >59 2022 02:32 AM    LABGLOM >60 2022 02:32 AM    GLUCOSE 117 2022 02:32 AM    PROT 7.3 2022 09:10 AM    LABALBU 4.4 2022 09:10 AM    CALCIUM 8.9 2022 02:32 AM    BILITOT 1.6 2022 09:10 AM    ALKPHOS 80 2022 09:10 AM    AST 17 2022 09:10 AM    ALT 10 2022 09:10 AM     No results found for this visit on 22. Lab Results   Component Value Date    PSA 0.43 2022    PSA 0.57 2021    PSA 0.35 2019     No results found for: PSAFREEPCT      [address] [facility]                                   Mian Skelton    Department of Pathology   FINAL SURGICAL PATHOLOGY REPORT   Patient Name: Paxton Rangel        Accession No:  [id number]    Age Sex:   1950    72 Y M        Pt Type: I     KLRVM 01                                              Location:   Account #     [de-identified]                 Collected:     2022   Med Rec #      [id number]                    Received:      2022   Attend Phys: Bin How             Completed:     2022   Perform Phys: Mike Segura     FINAL DIAGNOSIS:     Bladder tumor, transurethral resection:   Invasive high-grade urothelial carcinoma.    The tumor invades the muscularis propria.      TS/TS     IMAGING:  Narrative   EXAMINATION: CT ABDOMEN PELVIS W WO CONTRAST 5/6/2022 3:12 PM   HISTORY: Gross hematuria. DOSE: 1712 mGycm. Automatic exposure control was utilized in an effort   to use as little radiation as possible, without compromising image   quality. REPORT: Spiral CT of the abdomen and pelvis was performed with and   without intravenous contrast from the lung bases through the pubic   symphysis using CT urography protocol. Reconstructed coronal and   sagittal images are also reviewed. COMPARISON: There are no correlative imaging studies for comparison. Noncontrast images are reviewed initially, there is heavy calcified   atherosclerotic plaque within the coronary arteries, greatest at the   LAD distribution. Heart size appears normal. There are scattered   calcified granulomas in the left lung base. Review of lung windows   demonstrates mild subpleural scarring in the right middle lobe. No   pleural effusion is identified. There are scattered calcified   granulomas within the liver and spleen. Coarse calcifications are   noted within the head of the pancreas, compatible with chronic   pancreatitis. No gallstones are seen. There is a solitary   nonobstructing 2 mm nephrolithiasis in the mid right kidney. No   left-sided nephrolithiasis is identified. There is no   ureterolithiasis. There is heavy calcified atherosclerotic plaque   within the abdominal aorta and iliac arteries. Aortic caliber is   normal. There is mild ectasia of the iliac arteries. Arterial phase images demonstrate normal enhancement of the kidneys,   without evidence of a renal mass. Nephrographic phase images of the   kidneys are unremarkable. The pelvis, the bladder is not well   distended, there is a hyperattenuating mass in the right bladder,   measuring approximately 3.7 x 2.7 cm,   Delayed images demonstrate contrast in the bladder, the margins of the   mass which is very irregular in the mass has a sessile appearance.    This is compatible with bladder neoplasm until proven otherwise. Direct visualization and biopsy is recommended. The collecting   structures of the kidneys and ureters appear normal. The bladder mass   does appear to cover the orifice of the right ureter at least   partially. The liver and spleen are homogeneous on enhanced images with no   evidence of a mass. The gallbladder is unremarkable. The pancreas and   adrenal glands are within normal limits. Bowel loops are normal in   caliber and appear unremarkable. No free fluid or free air is   identified. No intra-abdominal pelvic lymphadenopathy is identified. The appendix appears normal. There is diastases of the rectus   abdominis muscle which are, with a small 2 cm fat-containing   periumbilical hernia. Review of bone windows shows no acute   abnormality, there is previous fixation of the right hip. There is   chronic bilateral sacroiliitis. Degenerative changes are seen in the   thoracolumbar spine diffusely and there is a chronic-appearing   superior endplate compression fracture at L1 with associated Schmorl's   node defect.       Impression   1. Sessile appearing mass in the base of the bladder on the right,   measuring approximately 3.7 x 2.7 cm, this is compatible with bladder   neoplasm until proven otherwise. Consider follow-up with direct   visualization and biopsy. The mass partially covers the right ureteral   orifice. There is no urinary tract obstruction and the kidneys and   ureters appear unremarkable except for a 2 mm nonobstructing stone in   the right mid kidney. No intra-abdominal or pelvic lymphadenopathy is   identified. 2. Coarse calcifications within the pancreas compatible chronic   pancreatitis. 3. Chronic bilateral sacroiliitis. Chronic-appearing superior endplate   compression fracture of L1 with associated Schmorl's node defect. No   definite osseous metastases. Signed by Dr Eliazar Pathak documentation review of studies and summarization of the record. Pathology report shows high-grade muscle invasive bladder cancer. His previous CT scan done in May showed no distant metastasis however I will repeat a CT scan of the chest abdomen pelvis. We will make referral to medical oncology to initiate recommendations for neoadjuvant chemotherapy. We also will make a referral to the [facility] for cystectomy urinary diversion think he probably prefers ileal loop. We discussed all this with the patient today and he is agreeable to proceed. We discussed other options for treatment but will defer further discussion regarding this to the medical oncologist and urologic surgeon for options of cystectomy and diversion. Guarding his stent I have him follow-up to see me in proxy 4 weeks for stent removal.  - CT ABDOMEN PELVIS W IV CONTRAST Additional Contrast? Oral; Future  - CT CHEST W CONTRAST; Future      Orders Placed This Encounter   Procedures    CT ABDOMEN PELVIS W IV CONTRAST Additional Contrast? Oral     Standing Status:   Future     Standing Expiration Date:   7/14/2023     Order Specific Question:   Additional Contrast?     Answer:   Oral     Order Specific Question:   STAT Creatinine as needed:     Answer:   No     Order Specific Question:   Reason for exam:     Answer:   Muscle invasive bladder cancer rule out metastasis    CT CHEST W CONTRAST     Standing Status:   Future     Standing Expiration Date:   7/14/2023     Order Specific Question:   STAT Creatinine as needed:     Answer:   No     Order Specific Question:   Reason for exam:     Answer:   Muscle invasive bladder cancer rule out metastasis    HI Measure, post-void residual, US, non-imaging     0ml        Return in about 4 weeks (around 8/11/2022) for Cystoscopy stent removal on next visit.     All information inputted into the note by the MA to include chief complaint, past medical history, past surgical history, medications, allergies, social and family history and review of systems has been reviewed and updated as needed by me. EMR Dragon/transcription disclaimer: Much of this documentt is electronic  transcription/translation of spoken language to printed text. The  electronic translation of spoken language may be erroneous, or at times,  nonsensical words or phrases may be inadvertently transcribed.  Although I  have reviewed the document for such errors, some may still exist.

## 2022-07-20 ENCOUNTER — HOSPITAL ENCOUNTER (OUTPATIENT)
Dept: CT IMAGING | Age: 72
Discharge: HOME | End: 2022-07-20
Payer: MEDICARE

## 2022-07-20 DIAGNOSIS — C67.8 MALIGNANT NEOPLASM OF OVERLAPPING SITES OF BLADDER (HCC): ICD-10-CM

## 2022-07-20 LAB
GFR AFRICAN AMERICAN: >60
GFR NON-AFRICAN AMERICAN: >60
PERFORMED ON: NORMAL
POC CREATININE: 1 MG/DL (ref 0.3–1.3)
POC SAMPLE TYPE: NORMAL

## 2022-07-20 PROCEDURE — 82565 ASSAY OF CREATININE: CPT

## 2022-07-20 PROCEDURE — 71260 CT THORAX DX C+: CPT | Performed by: RADIOLOGY

## 2022-07-20 PROCEDURE — 74177 CT ABD & PELVIS W/CONTRAST: CPT | Performed by: RADIOLOGY

## 2022-07-20 PROCEDURE — 74177 CT ABD & PELVIS W/CONTRAST: CPT

## 2022-07-20 PROCEDURE — 71260 CT THORAX DX C+: CPT

## 2022-07-20 PROCEDURE — 6360000004 HC RX CONTRAST MEDICATION: Performed by: UROLOGY

## 2022-07-20 RX ADMIN — IOPAMIDOL 70 ML: 755 INJECTION, SOLUTION INTRAVENOUS at 11:39

## 2022-07-21 ENCOUNTER — TELEPHONE (OUTPATIENT)
Dept: FAMILY MEDICINE CLINIC | Age: 72
End: 2022-07-21

## 2022-07-21 NOTE — TELEPHONE ENCOUNTER
Pt daughter called and stated pt has been in alot of pain. Every time he stands on his feet and walks or gets to moving he hurts really bad. Pt has been diagnosed with bladder cancer. [facility]

## 2022-07-21 NOTE — TELEPHONE ENCOUNTER
Please call back and clarified where patient is having pain so we can see if we need to have Dr Vasquez Palomino with Urology help address this issue since he is treating his bladder cancer or if it is something I can address myself appropriately.

## 2022-07-22 NOTE — TELEPHONE ENCOUNTER
Spoke with patient and he said his pain is at the end of his penis with a terrible sting and on his right side where the stents had been placed. He stated the \"urine pain relief\" pills doesn't seem to be helping him as all. He was wanting a prescription for the stinging.  He also has an appt to see Dr Edward Leung on 8/15/22

## 2022-07-22 NOTE — TELEPHONE ENCOUNTER
We need to have Dr Eileen Orosco office help with this issue then since he would have better recommendations on how to treat that given patient's history and recent surgery

## 2022-08-04 NOTE — PROGRESS NOTES
MEDICAL ONCOLOGY CONSULTATION    Pt Name: Peggy Hi  MRN: [id number]  YOB: 1950  Date of evaluation: 8/10/2022    REASON FOR CONSULTATION:  Bladder cancer   REQUESTING PHYSICIAN: Dr Filemon Downing    History Obtained From:  patient and old medical records    HISTORY OF PRESENT ILLNESS:    Diagnosis  Muscle invasive high grade urothelial carcinoma, bladder, June 2022  Stage II, [id number]    Treatment Summary  6/29/22- TURBT by Dr Filemon Downing  Anticipated 4 cycles neoadjuvant chemotherapy dose dense MVAC with G-CSF support  Anticipated consultation at [facility], urology    Cancer History  Elif Khan was first seen by me on 8/10/2022. He was referred by urology, [facility] for diagnosis of muscle invasive bladder cancer. Patient presented with complaints of hematuria in June 2022. Imaging studies showed sessile mass involving the right side of his bladder. No pelvic respiratory adenopathy. No evidence of distant metastasis. Cystoscopy was performed for transurethral resection of bladder tumor. Biopsy consistent with muscle invasive disease. Therefore, he was referred to me and also urology at [facility]. The patient denies any major comorbidities. He is quite functional.  Patient is a current smoker. 5/6/22 CT abd/pelvis: Sessile appearing mass in the base of the bladder on the right, measuring approximately 3.7 x 2.7 cm, this is compatible with bladder neoplasm until proven otherwise. Consider follow-up with direct visualization and biopsy. The mass partially covers the right ureteral orifice. There is no urinary tract obstruction and the kidneys and ureters appear unremarkable except for a 2 mm nonobstructing stone in the right mid kidney. No intra-abdominal or pelvic lymphadenopathy is identified. Coarse calcifications within the pancreas compatible chronic pancreatitis. Chronic bilateral sacroiliitis.  Chronic-appearing superior endplate compression fracture of L1 with associated Schmorl's node defect. No definite osseous metastases. 6/29/22 Cystoscopy/TURBT by Dr Roro Galloway:  Normal left retrograde pyelogram.  Right ureteral orifice adjacent to bladder tumor which was lateral.  Did not have to resect the orifice. Right retrograde showed narrowing at the UVJ and some mild ureteral dilation no filling defect. Right ureteral stent placed after bladder tumor resection 6 [place] by 20 cm. Will remain in for 6 weeks. Papillary bladder tumor multiple overlapping sites at the bladder neck at 630 and smaller papillary tumor at 12:00, between the bladder neck and trigone, mid trigone, and large greater than 5 cm solid tumor involving the right trigone laterally and right lateral wall. This was completely resected down to visible muscle. Possible gross muscle invasion  6/29/22 Bladder tumor, transurethral resection: Invasive high-grade urothelial carcinoma. The tumor invades the muscularis propria. 7/20/22 CT CHEST W CONTRAST  No acute chest pathology. Paraseptal emphysema in both upper lobes and lower lobes. Centrilobular emphysematous changes in bilateral upper lobes. Other nonacute findings above. Comparison: Radiographs of chest dated 06/23/2022.   7/20/22 CT ABDOMEN PELVIS W IV CONTRAST Postop changes in the urinary bladder as described above. Status post right sided double J ureteral stents placement. Evidence of prior granulomatous disease 4. Superior and play compression fracture at L1 with 50% loss of vertebral body height. 08/10/22- he was first seen by me. Discussed NCCN guidelines. Recommend consultation [place] with urology. Recommend neoadjuvant chemotherapy dose dense MVAC x4 cycles with G-CSF support.     Past Medical History:    Past Medical History:   Diagnosis Date    COPD (chronic obstructive pulmonary disease) ([address]     Nocturia 6/27/2019    Positive colorectal cancer screening using Cologuard test 10/8/2019       Past Surgical History:    Past Surgical History:   Procedure Laterality Date    BLADDER TUMOR EXCISION      CATARACT REMOVAL Bilateral     COLONOSCOPY N/A 11/7/2019    Dr Michelle Eddy-Tubulovillous AP, (-) dysplasia x 1, tubular AP, (-) dysplasia x 4, BCM x 1, 3 yr recall    CYSTOSCOPY N/A 6/29/2022    CYSTOSCOPY TRANSURETHRAL RESECTION BLADDER TUMOR GREATER THAN 5CM performed by Jeanne Mak MD at [address] Bilateral 6/29/2022    BILATERAL URETERAL CATHETERIZATION BILATERAL RETROGRADE PYELOGRAM performed by Jeanne Mak MD at [address] Right 6/29/2022    RIGHT URETERAL STENT INSERTION performed by Jeanne Mak MD at [address] [facility] Right     shoulder, ball and joint    HUMERUS FRACTURE SURGERY Right     LEG SURGERY Right     femur fracture    SHOULDER SURGERY Right 1965    shoulder fracture, ORIF, football injury       Social History:    Marital status:   Smoking status: Currently smoker-x40 years  ETOH status:  [facility]    Family History:   Family History   Problem Relation Age of Onset    Diabetes Mother     Colon Polyps Mother     Cancer Father         Lung    Lung Cancer Father     No Known Problems Sister     Other Brother         disability with back    No Known Problems Maternal Grandmother     Heart Disease Maternal Grandfather     No Known Problems Paternal Grandmother     No Known Problems Paternal Grandfather     Pancreatic Cancer Daughter     Diabetes type 2  Daughter     Thyroid Disease Daughter     High Cholesterol Daughter     Colon Cancer Neg Hx     Esophageal Cancer Neg Hx     Liver Cancer Neg Hx     Liver Disease Neg Hx     Rectal Cancer Neg Hx     Stomach Cancer Neg Hx        Current Hospital Medications:    Current Outpatient Medications   Medication Sig Dispense Refill    ondansetron (ZOFRAN) 4 MG tablet Take 1 tablet by mouth every 6 hours as needed for Nausea or Vomiting 30 tablet 5    promethazine (PHENERGAN) 12.5 MG tablet Take 1 tablet by mouth every 6 hours as needed for Nausea 50 tablet 5     No current facility-administered medications for this visit. Allergies: No Known Allergies      Subjective   REVIEW OF SYSTEMS:   CONSTITUTIONAL: no fever, no night sweats, fatigue;  HEENT: no blurring of vision, no double vision, no hearing difficulty, no tinnitus, no ulceration, no dysplasia, no epistaxis;  LUNGS: no cough, no hemoptysis, no wheeze,  no shortness of breath;  CARDIOVASCULAR: no palpitation, no chest pain, no shortness of breath;  GI: no abdominal pain, no nausea, no vomiting, no diarrhea, no constipation;  GIDEON: dysuria, flank pain, no hematuria, no frequency or urgency, no nephrolithiasis;  MUSCULOSKELETAL: Back pain, no joint pain, no swelling, no stiffness;  ENDOCRINE: no polyuria, no polydipsia, no cold or heat intolerance;  HEMATOLOGY: no easy bruising or bleeding, no history of clotting disorder;  DERMATOLOGY: no skin rash, no eczema, no pruritus;  PSYCHIATRY: no depression, no anxiety, no panic attacks, no suicidal ideation, no homicidal ideation;  NEUROLOGY: no syncope, no seizures, no numbness or tingling of hands, no numbness or tingling of feet, no paresis;    Objective   /70 (Site: Left Upper Arm, Position: Sitting)   Pulse 77   Ht 6' 3\" (1.905 m)   Wt 184 lb 11.2 oz (83.8 kg)   SpO2 96%   BMI 23.09 kg/m²     PHYSICAL EXAM:  CONSTITUTIONAL: Alert, appropriate, no acute distress  EYES: Non icteric, EOM intact, pupils equal round   ENT: Mucus membranes moist, no oral pharyngeal lesions, external inspection of ears and nose are normal  NECK: Supple, no masses. No palpable thyroid mass  CHEST/LUNGS: CTA bilaterally, normal respiratory effort   CARDIOVASCULAR: RRR, no murmurs. No lower extremity edema  ABDOMEN: soft non-tender, active bowel sounds, no HSM. No palpable masses  EXTREMITIES: warm, full ROM in all 4 extremities, no focal weakness.   SKIN: warm, dry with no rashes or lesions  LYMPH: No cervical, clavicular, axillary, or inguinal lymphadenopathy  NEUROLOGIC: follows commands, non focal   PSYCH: mood and affect appropriate. Alert and oriented to time, place, person      LABORATORY RESULTS REVIEWED/ANALYZED BY ME:  Lab Results   Component Value Date    WBC 7.23 08/10/2022    HGB 15.2 08/10/2022    HCT 47.4 08/10/2022    .4 (H) 08/10/2022     (L) 08/10/2022     Lab Results   Component Value Date    NEUTROABS 4.66 08/10/2022       RADIOLOGY STUDIES REVIEWED BY ME:  As above    ASSESSMENT:    Orders Placed This Encounter   Procedures    NM BONE SCAN WHOLE BODY     Standing Status:   Future     Standing Expiration Date:   8/10/2023     Order Specific Question:   Reason for exam:     Answer:   staging for new diagnosis bladder cancer & bone pain    Echo 2d w doppler w color w contrast     Standing Status:   Future     Standing Expiration Date:   8/10/2023     Order Specific Question:   Reason for exam:     Answer:   assess cardiac function for cardiotoxic chemo        Jeanette Barboza was seen today for new patient. Diagnoses and all orders for this visit:    Malignant neoplasm of overlapping sites of bladder ([facility] [address]  -     Echo 2d w doppler w color w contrast; Future  -     NM BONE SCAN WHOLE BODY; Future    Care plan discussed with patient    Chemotherapy-induced nausea  -     ondansetron (ZOFRAN) 4 MG tablet; Take 1 tablet by mouth every 6 hours as needed for Nausea or Vomiting  -     promethazine (PHENERGAN) 12.5 MG tablet; Take 1 tablet by mouth every 6 hours as needed for Nausea    Bone pain  -     NM BONE SCAN WHOLE BODY;  Future    Examination prior to chemotherapy  -     Echo 2d w doppler w color w contrast; Future    At risk for cardiac complication  -     Echo 2d w doppler w color w contrast; Future    Encounter for monitoring cardiotoxic drug therapy  -     Echo 2d w doppler w color w contrast; Future     [id number] muscle invasive bladder cancer, June 2022  The patient was counseled today about diagnosis, staging, prognosis, diagnostic tests, medications, side effects and disease management. Discussed NCCN guidelines. Recommend consultation [place] with urology. Recommend neoadjuvant chemotherapy dose dense MVAC x4 cycles with G-CSF support.  -Complete bone scan for staging. Patient has complaints of back pain  -Proceed with urology consultation [place]  -Education material provided regarding chemotherapy toxicity. Treatment regimen:  ddMVAC, G-CSF support    Proceed cycle #1 when approved. PLAN:  RTC with MD MORRIS# 2  Recommend 4 cycles of neoadjuvant chemotherapy with dose dense Methotrexate, Vinblastine, Adriamycin, Cisplatin + GCSF every 14 days x4 cycles-once ins approves  Patient education given  Recommend Zofran 4mg every 6 hrs as needed-script sent  Recommend Phenergan 12.5mg every 6 hrs as needed-script sent  Proceed with Dr Keith Campbell Urology for surgery evaluation, 8/15/22  Continue follow--up with Dr. Sidney King/[facility]  At risk cardiac toxicity-2D echo requested  Bone scan for further staging-patient has complaint back pain  Treatment logistics, side effects and disease management discussed at length. Chet Hsieh am pre-charting as a registered nurse for Abigail Burnett MD. Electronically signed by Fredi Mattson RN on 8/10/2022 at 7:06 AM CDT. Aixa Hsieh am scribing for Abigail Burnett MD. Electronically signed by Fredi Mattson RN on 8/10/2022 at 2:11 PM CDT. I, Dr Sergei Scott, personally performed the services described in this documentation as scribed by Fredi Mattson RN in my presence and is both accurate and complete. I have seen, examined and reviewed this patient medication list, appropriate labs and imaging studies. I reviewed relevant medical records and others physicians notes. I discussed the plans of care with the patient. I answered all the questions to the patients satisfaction.  I have also reviewed the chief complaint (CC) and part of the history (History of Present Illness (HPI), Past Family Social History ([address]), or Review of Systems (ROS) and made changes when appropriated. (Please note that portions of this note were completed with a voice recognition program. Efforts were made to edit the dictations but occasionally words are mis-transcribed.)  Electronically signed by Paul Quinones MD on 8/10/2022 at 5:02 PM      The total time (63 minutes) I spent to see the patient today includes at least one or more of the following: preparing to see the patient by reviewing prior tests, prior notes or other relevant information, performing appropriate independent examination and evaluation, counseling, ordering of medications, tests or procedures,  documenting clinic information in the electronic medical record or other health records, independently interpreting results of tests, managing test results and communicating the results to the patient/family or caregiver.

## 2022-08-09 DIAGNOSIS — C67.8 MALIGNANT NEOPLASM OF OVERLAPPING SITES OF BLADDER (HCC): Primary | ICD-10-CM

## 2022-08-10 ENCOUNTER — HOSPITAL ENCOUNTER (OUTPATIENT)
Dept: INFUSION THERAPY | Age: 72
Discharge: HOME | End: 2022-08-10
Payer: MEDICARE

## 2022-08-10 ENCOUNTER — OFFICE VISIT (OUTPATIENT)
Dept: HEMATOLOGY | Age: 72
End: 2022-08-10
Payer: MEDICARE

## 2022-08-10 VITALS
SYSTOLIC BLOOD PRESSURE: 134 MMHG | HEART RATE: 77 BPM | BODY MASS INDEX: 22.97 KG/M2 | OXYGEN SATURATION: 96 % | HEIGHT: 75 IN | WEIGHT: 184.7 LBS | DIASTOLIC BLOOD PRESSURE: 70 MMHG

## 2022-08-10 DIAGNOSIS — Z91.89 AT RISK FOR CARDIAC COMPLICATION: ICD-10-CM

## 2022-08-10 DIAGNOSIS — Z51.81 ENCOUNTER FOR MONITORING CARDIOTOXIC DRUG THERAPY: ICD-10-CM

## 2022-08-10 DIAGNOSIS — C67.8 MALIGNANT NEOPLASM OF OVERLAPPING SITES OF BLADDER (HCC): ICD-10-CM

## 2022-08-10 DIAGNOSIS — Z79.899 ENCOUNTER FOR MONITORING CARDIOTOXIC DRUG THERAPY: ICD-10-CM

## 2022-08-10 DIAGNOSIS — Z71.89 CARE PLAN DISCUSSED WITH PATIENT: ICD-10-CM

## 2022-08-10 DIAGNOSIS — C67.8 MALIGNANT NEOPLASM OF OVERLAPPING SITES OF BLADDER (HCC): Primary | ICD-10-CM

## 2022-08-10 DIAGNOSIS — T45.1X5A CHEMOTHERAPY-INDUCED NAUSEA: ICD-10-CM

## 2022-08-10 DIAGNOSIS — R11.0 CHEMOTHERAPY-INDUCED NAUSEA: ICD-10-CM

## 2022-08-10 DIAGNOSIS — M89.8X9 BONE PAIN: ICD-10-CM

## 2022-08-10 DIAGNOSIS — Z01.818 EXAMINATION PRIOR TO CHEMOTHERAPY: ICD-10-CM

## 2022-08-10 LAB
BASOPHILS ABSOLUTE: 0.1 K/UL (ref 0.01–0.08)
BASOPHILS RELATIVE PERCENT: 1.4 % (ref 0.1–1.2)
EOSINOPHILS ABSOLUTE: 0.18 K/UL (ref 0.04–0.54)
EOSINOPHILS RELATIVE PERCENT: 2.5 % (ref 0.7–7)
HCT VFR BLD CALC: 47.4 % (ref 40.1–51)
HEMOGLOBIN: 15.2 G/DL (ref 13.7–17.5)
LYMPHOCYTES ABSOLUTE: 1.54 K/UL (ref 1.18–3.74)
LYMPHOCYTES RELATIVE PERCENT: 21.3 % (ref 19.3–53.1)
MCH RBC QN AUTO: 32.2 PG (ref 25.7–32.2)
MCHC RBC AUTO-ENTMCNC: 32.1 G/DL (ref 32.3–36.5)
MCV RBC AUTO: 100.4 FL (ref 79–92.2)
MONOCYTES ABSOLUTE: 0.74 K/UL (ref 0.24–0.82)
MONOCYTES RELATIVE PERCENT: 10.2 % (ref 4.7–12.5)
NEUTROPHILS ABSOLUTE: 4.66 K/UL (ref 1.56–6.13)
NEUTROPHILS RELATIVE PERCENT: 64.5 % (ref 34–71.1)
PDW BLD-RTO: 13.3 % (ref 11.6–14.4)
PLATELET # BLD: 117 K/UL (ref 163–337)
PMV BLD AUTO: 11.5 FL (ref 7.4–10.4)
RBC # BLD: 4.72 M/UL (ref 4.63–6.08)
WBC # BLD: 7.23 K/UL (ref 4.23–9.07)

## 2022-08-10 PROCEDURE — 99205 OFFICE O/P NEW HI 60 MIN: CPT | Performed by: INTERNAL MEDICINE

## 2022-08-10 PROCEDURE — 99212 OFFICE O/P EST SF 10 MIN: CPT

## 2022-08-10 PROCEDURE — 85025 COMPLETE CBC W/AUTO DIFF WBC: CPT

## 2022-08-10 PROCEDURE — 1123F ACP DISCUSS/DSCN MKR DOCD: CPT | Performed by: INTERNAL MEDICINE

## 2022-08-10 RX ORDER — PROMETHAZINE HYDROCHLORIDE 12.5 MG/1
12.5 TABLET ORAL EVERY 6 HOURS PRN
Qty: 50 TABLET | Refills: 5 | Status: SHIPPED | OUTPATIENT
Start: 2022-08-10 | End: 2022-08-17 | Stop reason: ALTCHOICE

## 2022-08-10 RX ORDER — ONDANSETRON 4 MG/1
4 TABLET, FILM COATED ORAL EVERY 6 HOURS PRN
Qty: 30 TABLET | Refills: 5 | Status: SHIPPED | OUTPATIENT
Start: 2022-08-10 | End: 2022-08-17 | Stop reason: ALTCHOICE

## 2022-08-10 ASSESSMENT — PROMIS GLOBAL HEALTH SCALE
IN GENERAL, WOULD YOU SAY YOUR HEALTH IS...[ON A SCALE OF 1 (POOR) TO 5 (EXCELLENT)]: 3
SUM OF RESPONSES TO QUESTIONS 3, 6, 7, & 8: 20
IN GENERAL, WOULD YOU SAY YOUR QUALITY OF LIFE IS...[ON A SCALE OF 1 (POOR) TO 5 (EXCELLENT)]: 3
TO WHAT EXTENT ARE YOU ABLE TO CARRY OUT YOUR EVERYDAY PHYSICAL ACTIVITIES SUCH AS WALKING, CLIMBING STAIRS, CARRYING GROCERIES, OR MOVING A CHAIR [ON A SCALE OF 1 (NOT AT ALL) TO 5 (COMPLETELY)]?: 4
IN GENERAL, HOW WOULD YOU RATE YOUR PHYSICAL HEALTH [ON A SCALE OF 1 (POOR) TO 5 (EXCELLENT)]?: 3
SUM OF RESPONSES TO QUESTIONS 2, 4, 5, & 10: 11
IN THE PAST 7 DAYS, HOW OFTEN HAVE YOU BEEN BOTHERED BY EMOTIONAL PROBLEMS, SUCH AS FEELING ANXIOUS, DEPRESSED, OR IRRITABLE [ON A SCALE FROM 1 (NEVER) TO 5 (ALWAYS)]?: 1
IN THE PAST 7 DAYS, HOW WOULD YOU RATE YOUR PAIN ON AVERAGE [ON A SCALE FROM 0 (NO PAIN) TO 10 (WORST IMAGINABLE PAIN)]?: 10
IN GENERAL, HOW WOULD YOU RATE YOUR MENTAL HEALTH, INCLUDING YOUR MOOD AND YOUR ABILITY TO THINK [ON A SCALE OF 1 (POOR) TO 5 (EXCELLENT)]?: 4
IN GENERAL, HOW WOULD YOU RATE YOUR SATISFACTION WITH YOUR SOCIAL ACTIVITIES AND RELATIONSHIPS [ON A SCALE OF 1 (POOR) TO 5 (EXCELLENT)]?: 3
IN THE PAST 7 DAYS, HOW WOULD YOU RATE YOUR FATIGUE ON AVERAGE [ON A SCALE FROM 1 (NONE) TO 5 (VERY SEVERE)]?: 3
IN GENERAL, PLEASE RATE HOW WELL YOU CARRY OUT YOUR USUAL SOCIAL ACTIVITIES (INCLUDES ACTIVITIES AT HOME, AT WORK, AND IN YOUR COMMUNITY, AND RESPONSIBILITIES AS A PARENT, CHILD, SPOUSE, EMPLOYEE, FRIEND, ETC) [ON A SCALE OF 1 (POOR) TO 5 (EXCELLENT)]?: 3

## 2022-08-12 ENCOUNTER — TELEPHONE (OUTPATIENT)
Dept: INFUSION THERAPY | Age: 72
End: 2022-08-12

## 2022-08-12 NOTE — TELEPHONE ENCOUNTER
Introduced myself to Edgar's wife today. I went over [employer] benefits with her for his chemotherapy and informed her that he has $4380.00 remaining on his out of pocket. I informed her that there was a foundation called [employer] that can help cover the cost of his chemotherapy drugs. She was agreeable with me applying on his behalf. I obtained the household income information. I applied through the [employer] portal and he was awarded $2500. I am going to mail them a copy of my contact information if they have any billing or financial concerns. He has also applied for [employer].     Jasen Jacobsen, [address] [place] [address] Medical Oncology and Hematology  [phone]

## 2022-08-17 ENCOUNTER — PROCEDURE VISIT (OUTPATIENT)
Dept: UROLOGY | Age: 72
End: 2022-08-17
Payer: MEDICARE

## 2022-08-17 VITALS — HEIGHT: 75 IN | BODY MASS INDEX: 23 KG/M2 | WEIGHT: 185 LBS

## 2022-08-17 DIAGNOSIS — C67.8 MALIGNANT NEOPLASM OF OVERLAPPING SITES OF BLADDER (HCC): ICD-10-CM

## 2022-08-17 DIAGNOSIS — Z96.0 URETERAL STENT RETAINED: Primary | ICD-10-CM

## 2022-08-17 PROCEDURE — 52310 CYSTOSCOPY AND TREATMENT: CPT | Performed by: UROLOGY

## 2022-08-17 PROCEDURE — 99999 PR OFFICE/OUTPT VISIT,PROCEDURE ONLY: CPT | Performed by: UROLOGY

## 2022-08-17 NOTE — PROGRESS NOTES
Jennifer Calvo is a 67 y.o. male who presents today   Chief Complaint   Patient presents with    Cystoscopy     I am here today for my cysto stent removal.       HPI: Muscle invasive bladder cancer  Please see the documentation as copied from his visit on 7/14/2022. Since that visit he is seen medical oncology Dr. Lula Jarrell and the plans is for him to proceed with neoadjuvant c chemotherapy with dose dense MVAC plus G-CSF he has seen Dr. Ren Guevara at [facility] on 8/15/2022 and return to see her in approximate 3.5 months for cystectomy urinary diversion after completion of chemotherapy. He comes in today to have his right stent removed. Bladder cancer:  Patient was initially diagnosed with bladder cancer on 6/29/2022. This was found in the context of him presenting with gross hematuria CT scan showed a sessile mass of on the right side of his bladder. CT was done on 5/6/2022. Cystoscopy confirmed a bladder tumor. He then underwent TURBT on 6/29/2022. Patient's  Bladder cancer is characterized as Muscle invasive. Bladder cancer stage II - T2, N0, M0<br>II - T2a, N0, M0<br>II - T2b, N0, M0  Prior  treatment TURBT done 6/29/2022. He comes in today discussed path report. His right trigone was resected therefore he has a right ureteral stent which was placed at the time of surgery. This remains in place. . Current symptoms include urgency, frequency, hematuria, dysuria. He was sent home on 5 days of Cipro he then saw his PCP on 7/12/2022 who put him on a course of Bactrim for UTI but the urine culture was no growth. Imaging of upper tracts approximately 2 week(s) ago was bilateral retrograde pyelograms done at the time of his tiered PT on 6/29/2022. CT urogram for hematuria work-up done on 5/6/2022 also showed normal upper tracts no evidence of any metastasis or gross extension at that time.            Past Medical History:   Diagnosis Date    COPD (chronic obstructive pulmonary disease) ([facility] [address] Nocturia 6/27/2019    Positive colorectal cancer screening using Cologuard test 10/8/2019       Past Surgical History:   Procedure Laterality Date    BLADDER TUMOR EXCISION      CATARACT REMOVAL Bilateral     COLONOSCOPY N/A 11/7/2019    Dr DAYANA Eddy-Tubulovillous AP, (-) dysplasia x 1, tubular AP, (-) dysplasia x 4, BCM x 1, 3 yr recall    CYSTOSCOPY N/A 6/29/2022    CYSTOSCOPY TRANSURETHRAL RESECTION BLADDER TUMOR GREATER THAN 5CM performed by Devin Arshad MD at [address] Bilateral 6/29/2022    BILATERAL URETERAL CATHETERIZATION BILATERAL RETROGRADE PYELOGRAM performed by Devin Arshad MD at [address] Right 6/29/2022    RIGHT URETERAL STENT INSERTION performed by Devin Arshad MD at [address] [facility] Right     shoulder, ball and joint    HUMERUS FRACTURE SURGERY Right     LEG SURGERY Right     femur fracture    SHOULDER SURGERY Right 1965    shoulder fracture, ORIF, football injury       No current outpatient medications on file. No current facility-administered medications for this visit. No Known Allergies      REVIEW OF SYSTEMS:  Review of Systems    PHYSICAL EXAM:  Physical Exam      Cystoscopy Procedure Note    Indications: Diagnosis,  Indwelling Right Ureteral stent     Pre-operative Diagnosis: Retained right ureteral stent status post TURBT with resection of right trigone    Post-operative Diagnosis: Same    Surgeon: Devin Arshad MD     Assistants: staff    Anesthesia: Local anesthesia topical 2% lidocaine gel    Procedure Details   The risks, benefits, complications, treatment options, and expected outcomes were discussedwith the patient. The patient concurred with the proposed plan, giving informed consent. Cystoscopy was performed today under local anesthesia, using sterile technique. The patient was placed in the supine position, prepped with Hibiclens, and draped in the usual sterile fashion.  A 17 Frisian sheath flexible cystoscope was used to inspect both the urethra and bladder. A stent was seenprotruding from the Right ureteral orifice. It was grasped with a grasper and the stent was moved removed in its entirety, without difficulty. Findings:  Anterior urethra: normal without strictures and without scarring. Prostate:  Prostatic urethra: 2+ mild prostatic hyperplasia, without bladder neck contracture no median lobe  Bladder: There was some necrotic tissue in the area of the prior resection site stent was seen protruding no papillary tumors seen, without lesions . There was moderate mucosa edema, irritation from the stent   Ureteral orifice(s) was/were seen right. Ureteral orifice(s) right were in the resected location and there was clear urine  Stent was grasped and removed without difficulty                            Complications:  None;patient tolerated the procedure well. Disposition: To home after observation.            Condition: stable          DATA:  CBC:   Lab Results   Component Value Date/Time    WBC 7.23 08/10/2022 01:55 PM    RBC 4.72 08/10/2022 01:55 PM    HGB 15.2 08/10/2022 01:55 PM    HCT 47.4 08/10/2022 01:55 PM    .4 08/10/2022 01:55 PM    MCH 32.2 08/10/2022 01:55 PM    MCHC 32.1 08/10/2022 01:55 PM    RDW 13.3 08/10/2022 01:55 PM     08/10/2022 01:55 PM    MPV 11.5 08/10/2022 01:55 PM     CMP:    Lab Results   Component Value Date/Time     06/30/2022 02:32 AM    K 4.5 06/30/2022 02:32 AM     06/30/2022 02:32 AM    CO2 21 06/30/2022 02:32 AM    BUN 14 06/30/2022 02:32 AM    CREATININE 1.0 07/20/2022 11:25 AM    CREATININE 0.7 06/30/2022 02:32 AM    GFRAA >60 07/20/2022 11:25 AM    LABGLOM >60 07/20/2022 11:25 AM    GLUCOSE 117 06/30/2022 02:32 AM    PROT 7.3 06/23/2022 09:10 AM    LABALBU 4.4 06/23/2022 09:10 AM    CALCIUM 8.9 06/30/2022 02:32 AM    BILITOT 1.6 06/23/2022 09:10 AM    ALKPHOS 80 06/23/2022 09:10 AM    AST 17 06/23/2022 09:10 AM    ALT 10 06/23/2022 09:10 AM 1. Ureteral stent retained  His right stent was removed today. He does understand if he develops right flank pain or fever to call and let us know. - DC CYSTOURETHROGRAPHY REMV CALCULUS, STENT, FOREIGN BODY, SIMPLE    2. Malignant neoplasm of overlapping sites of bladder [facility])  He has seen both medical oncology and urology at [facility] with plans for neoadjuvant chemotherapy followed by cystectomy. - DC CYSTOURETHROGRAPHY REMV CALCULUS, STENT, FOREIGN BODY, SIMPLE      Orders Placed This Encounter   Procedures    DC CYSTOURETHROGRAPHY REMV CALCULUS, STENT, FOREIGN BODY, SIMPLE        Return if symptoms worsen or fail to improve.

## 2022-08-23 ENCOUNTER — HOSPITAL ENCOUNTER (OUTPATIENT)
Dept: NON INVASIVE DIAGNOSTICS | Age: 72
Discharge: HOME | End: 2022-08-23
Payer: MEDICARE

## 2022-08-23 ENCOUNTER — HOSPITAL ENCOUNTER (OUTPATIENT)
Dept: NUCLEAR MEDICINE | Age: 72
Discharge: HOME | End: 2022-08-25
Payer: MEDICARE

## 2022-08-23 DIAGNOSIS — Z79.899 ENCOUNTER FOR MONITORING CARDIOTOXIC DRUG THERAPY: ICD-10-CM

## 2022-08-23 DIAGNOSIS — C67.8 MALIGNANT NEOPLASM OF OVERLAPPING SITES OF BLADDER (HCC): ICD-10-CM

## 2022-08-23 DIAGNOSIS — Z01.818 EXAMINATION PRIOR TO CHEMOTHERAPY: ICD-10-CM

## 2022-08-23 DIAGNOSIS — Z51.81 ENCOUNTER FOR MONITORING CARDIOTOXIC DRUG THERAPY: ICD-10-CM

## 2022-08-23 DIAGNOSIS — M89.8X9 BONE PAIN: ICD-10-CM

## 2022-08-23 DIAGNOSIS — Z91.89 AT RISK FOR CARDIAC COMPLICATION: ICD-10-CM

## 2022-08-23 LAB
LV EF: 58 %
LVEF MODALITY: NORMAL

## 2022-08-23 PROCEDURE — 78306 BONE IMAGING WHOLE BODY: CPT | Performed by: INTERNAL MEDICINE

## 2022-08-23 PROCEDURE — A9561 TC99M OXIDRONATE: HCPCS | Performed by: INTERNAL MEDICINE

## 2022-08-23 PROCEDURE — 93356 MYOCRD STRAIN IMG SPCKL TRCK: CPT

## 2022-08-23 PROCEDURE — 93306 TTE W/DOPPLER COMPLETE: CPT

## 2022-08-23 PROCEDURE — 3430000000 HC RX DIAGNOSTIC RADIOPHARMACEUTICAL: Performed by: INTERNAL MEDICINE

## 2022-08-23 PROCEDURE — 78306 BONE IMAGING WHOLE BODY: CPT | Performed by: RADIOLOGY

## 2022-08-23 RX ADMIN — TECHNETIUM TC 99M OXIDRONATE 20 MILLICURIE: 3.15 INJECTION, POWDER, LYOPHILIZED, FOR SOLUTION INTRAVENOUS at 12:05

## 2022-08-31 ENCOUNTER — HOSPITAL ENCOUNTER (OUTPATIENT)
Dept: INFUSION THERAPY | Age: 72
Discharge: HOME | End: 2022-08-31
Payer: MEDICARE

## 2022-08-31 VITALS
DIASTOLIC BLOOD PRESSURE: 81 MMHG | OXYGEN SATURATION: 96 % | BODY MASS INDEX: 23.3 KG/M2 | SYSTOLIC BLOOD PRESSURE: 155 MMHG | HEIGHT: 75 IN | TEMPERATURE: 97.4 F | RESPIRATION RATE: 18 BRPM | HEART RATE: 72 BPM | WEIGHT: 187.4 LBS

## 2022-08-31 DIAGNOSIS — C67.8 MALIGNANT NEOPLASM OF OVERLAPPING SITES OF BLADDER (HCC): Primary | ICD-10-CM

## 2022-08-31 DIAGNOSIS — C67.9 MALIGNANT NEOPLASM OF URINARY BLADDER, UNSPECIFIED SITE (HCC): ICD-10-CM

## 2022-08-31 LAB
ALBUMIN SERPL-MCNC: 4.4 G/DL (ref 3.5–5.2)
ALP BLD-CCNC: 77 U/L (ref 40–130)
ALT SERPL-CCNC: 14 U/L (ref 21–72)
ANION GAP SERPL CALCULATED.3IONS-SCNC: 6 MMOL/L (ref 7–19)
AST SERPL-CCNC: 21 U/L (ref 17–59)
BILIRUB SERPL-MCNC: 1.1 MG/DL (ref 0.2–1.3)
BUN BLDV-MCNC: 17 MG/DL (ref 9–20)
CALCIUM SERPL-MCNC: 9.2 MG/DL (ref 8.4–10.2)
CHLORIDE BLD-SCNC: 108 MMOL/L (ref 98–111)
CO2: 24 MMOL/L (ref 22–29)
CREAT SERPL-MCNC: 0.7 MG/DL (ref 0.6–1.2)
GFR NON-AFRICAN AMERICAN: >60
GLOBULIN: 2.8 G/DL
GLUCOSE BLD-MCNC: 128 MG/DL (ref 74–106)
HCT VFR BLD CALC: 43.6 % (ref 40.1–51)
HEMOGLOBIN: 14.5 G/DL (ref 13.7–17.5)
LYMPHOCYTES ABSOLUTE: 1.18 K/UL (ref 1.18–3.74)
LYMPHOCYTES RELATIVE PERCENT: 19.8 % (ref 19.3–53.1)
MCH RBC QN AUTO: 32 PG (ref 25.7–32.2)
MCHC RBC AUTO-ENTMCNC: 33.3 G/DL (ref 32.3–36.5)
MCV RBC AUTO: 96.2 FL (ref 79–92.2)
MONOCYTES ABSOLUTE: 0.64 K/UL (ref 0.24–0.82)
MONOCYTES RELATIVE PERCENT: 10.7 % (ref 4.7–12.5)
NEUTROPHILS ABSOLUTE: 3.98 K/UL (ref 1.56–6.13)
NEUTROPHILS RELATIVE PERCENT: 66.8 % (ref 34–71.1)
PDW BLD-RTO: 13.5 % (ref 11.6–14.4)
PLATELET # BLD: 170 K/UL (ref 163–337)
PMV BLD AUTO: 10.7 FL (ref 7.4–10.4)
POTASSIUM SERPL-SCNC: 4 MMOL/L (ref 3.5–5.1)
RBC # BLD: 4.53 M/UL (ref 4.63–6.08)
SODIUM BLD-SCNC: 138 MMOL/L (ref 137–145)
TOTAL PROTEIN: 7.3 G/DL (ref 6.3–8.2)
WBC # BLD: 5.96 K/UL (ref 4.23–9.07)

## 2022-08-31 PROCEDURE — 96367 TX/PROPH/DG ADDL SEQ IV INF: CPT

## 2022-08-31 PROCEDURE — 2580000003 HC RX 258: Performed by: INTERNAL MEDICINE

## 2022-08-31 PROCEDURE — 85025 COMPLETE CBC W/AUTO DIFF WBC: CPT

## 2022-08-31 PROCEDURE — 96413 CHEMO IV INFUSION 1 HR: CPT

## 2022-08-31 PROCEDURE — 6360000002 HC RX W HCPCS: Performed by: INTERNAL MEDICINE

## 2022-08-31 PROCEDURE — 80053 COMPREHEN METABOLIC PANEL: CPT

## 2022-08-31 RX ORDER — SODIUM CHLORIDE 0.9 % (FLUSH) 0.9 %
5-40 SYRINGE (ML) INJECTION PRN
Status: DISCONTINUED | OUTPATIENT
Start: 2022-08-31 | End: 2022-09-01 | Stop reason: HOSPADM

## 2022-08-31 RX ORDER — DIPHENHYDRAMINE HYDROCHLORIDE 50 MG/ML
50 INJECTION INTRAMUSCULAR; INTRAVENOUS
Status: CANCELLED | OUTPATIENT
Start: 2022-09-01

## 2022-08-31 RX ORDER — EPINEPHRINE 1 MG/ML
0.3 INJECTION, SOLUTION, CONCENTRATE INTRAVENOUS PRN
Status: CANCELLED | OUTPATIENT
Start: 2022-09-01

## 2022-08-31 RX ORDER — ALBUTEROL SULFATE 90 UG/1
4 AEROSOL, METERED RESPIRATORY (INHALATION) PRN
Status: CANCELLED | OUTPATIENT
Start: 2022-08-31

## 2022-08-31 RX ORDER — FAMOTIDINE 10 MG/ML
20 INJECTION, SOLUTION INTRAVENOUS
Status: CANCELLED | OUTPATIENT
Start: 2022-08-31

## 2022-08-31 RX ORDER — ONDANSETRON 2 MG/ML
8 INJECTION INTRAMUSCULAR; INTRAVENOUS
Status: CANCELLED | OUTPATIENT
Start: 2022-08-31

## 2022-08-31 RX ORDER — HEPARIN SODIUM (PORCINE) LOCK FLUSH IV SOLN 100 UNIT/ML 100 UNIT/ML
500 SOLUTION INTRAVENOUS PRN
Status: CANCELLED | OUTPATIENT
Start: 2022-09-01

## 2022-08-31 RX ORDER — SODIUM CHLORIDE 9 MG/ML
5-250 INJECTION, SOLUTION INTRAVENOUS PRN
Status: DISCONTINUED | OUTPATIENT
Start: 2022-08-31 | End: 2022-09-01 | Stop reason: HOSPADM

## 2022-08-31 RX ORDER — METHOTREXATE 25 MG/ML
30 INJECTION, SOLUTION INTRA-ARTERIAL; INTRAMUSCULAR; INTRAVENOUS ONCE
Status: DISCONTINUED | OUTPATIENT
Start: 2022-08-31 | End: 2022-08-31

## 2022-08-31 RX ORDER — METHOTREXATE SODIUM 25 MG/ML
30 INJECTION, SOLUTION INTRA-ARTERIAL; INTRAMUSCULAR; INTRAVENOUS ONCE
Status: CANCELLED | OUTPATIENT
Start: 2022-08-31

## 2022-08-31 RX ORDER — ALBUTEROL SULFATE 90 UG/1
4 AEROSOL, METERED RESPIRATORY (INHALATION) PRN
Status: CANCELLED | OUTPATIENT
Start: 2022-09-01

## 2022-08-31 RX ORDER — ONDANSETRON 2 MG/ML
8 INJECTION INTRAMUSCULAR; INTRAVENOUS
Status: CANCELLED | OUTPATIENT
Start: 2022-09-01

## 2022-08-31 RX ORDER — SODIUM CHLORIDE 0.9 % (FLUSH) 0.9 %
5-40 SYRINGE (ML) INJECTION PRN
Status: CANCELLED | OUTPATIENT
Start: 2022-08-31

## 2022-08-31 RX ORDER — HEPARIN SODIUM (PORCINE) LOCK FLUSH IV SOLN 100 UNIT/ML 100 UNIT/ML
500 SOLUTION INTRAVENOUS PRN
Status: DISCONTINUED | OUTPATIENT
Start: 2022-08-31 | End: 2022-09-01 | Stop reason: HOSPADM

## 2022-08-31 RX ORDER — DIPHENHYDRAMINE HYDROCHLORIDE 50 MG/ML
50 INJECTION INTRAMUSCULAR; INTRAVENOUS
Status: CANCELLED | OUTPATIENT
Start: 2022-08-31

## 2022-08-31 RX ORDER — SODIUM CHLORIDE 9 MG/ML
5-40 INJECTION INTRAVENOUS PRN
Status: CANCELLED | OUTPATIENT
Start: 2022-08-31

## 2022-08-31 RX ORDER — SODIUM CHLORIDE 0.9 % (FLUSH) 0.9 %
5-40 SYRINGE (ML) INJECTION PRN
Status: CANCELLED | OUTPATIENT
Start: 2022-09-01

## 2022-08-31 RX ORDER — SODIUM CHLORIDE 9 MG/ML
5-250 INJECTION, SOLUTION INTRAVENOUS PRN
Status: CANCELLED | OUTPATIENT
Start: 2022-09-01

## 2022-08-31 RX ORDER — ACETAMINOPHEN 325 MG/1
650 TABLET ORAL
Status: CANCELLED | OUTPATIENT
Start: 2022-09-01

## 2022-08-31 RX ORDER — SODIUM CHLORIDE 9 MG/ML
INJECTION, SOLUTION INTRAVENOUS CONTINUOUS
Status: CANCELLED | OUTPATIENT
Start: 2022-09-01

## 2022-08-31 RX ORDER — MEPERIDINE HYDROCHLORIDE 50 MG/ML
12.5 INJECTION INTRAMUSCULAR; INTRAVENOUS; SUBCUTANEOUS PRN
Status: CANCELLED | OUTPATIENT
Start: 2022-08-31

## 2022-08-31 RX ORDER — SODIUM CHLORIDE 9 MG/ML
5-40 INJECTION INTRAVENOUS PRN
Status: CANCELLED | OUTPATIENT
Start: 2022-09-01

## 2022-08-31 RX ORDER — SODIUM CHLORIDE 9 MG/ML
5-250 INJECTION, SOLUTION INTRAVENOUS PRN
Status: CANCELLED | OUTPATIENT
Start: 2022-08-31

## 2022-08-31 RX ORDER — DOXORUBICIN HYDROCHLORIDE 2 MG/ML
30 INJECTION, SOLUTION INTRAVENOUS ONCE
Status: CANCELLED | OUTPATIENT
Start: 2022-09-01

## 2022-08-31 RX ORDER — MEPERIDINE HYDROCHLORIDE 50 MG/ML
12.5 INJECTION INTRAMUSCULAR; INTRAVENOUS; SUBCUTANEOUS PRN
Status: CANCELLED | OUTPATIENT
Start: 2022-09-01

## 2022-08-31 RX ORDER — PALONOSETRON 0.05 MG/ML
0.25 INJECTION, SOLUTION INTRAVENOUS ONCE
Status: CANCELLED | OUTPATIENT
Start: 2022-09-01 | End: 2022-09-01

## 2022-08-31 RX ORDER — HEPARIN SODIUM (PORCINE) LOCK FLUSH IV SOLN 100 UNIT/ML 100 UNIT/ML
500 SOLUTION INTRAVENOUS PRN
Status: CANCELLED | OUTPATIENT
Start: 2022-08-31

## 2022-08-31 RX ORDER — FUROSEMIDE 10 MG/ML
20 INJECTION INTRAMUSCULAR; INTRAVENOUS ONCE
Status: CANCELLED
Start: 2022-09-01 | End: 2022-09-01

## 2022-08-31 RX ORDER — EPINEPHRINE 1 MG/ML
0.3 INJECTION, SOLUTION, CONCENTRATE INTRAVENOUS PRN
Status: CANCELLED | OUTPATIENT
Start: 2022-08-31

## 2022-08-31 RX ORDER — FAMOTIDINE 10 MG/ML
20 INJECTION, SOLUTION INTRAVENOUS
Status: CANCELLED | OUTPATIENT
Start: 2022-09-01

## 2022-08-31 RX ORDER — ACETAMINOPHEN 325 MG/1
650 TABLET ORAL
Status: CANCELLED | OUTPATIENT
Start: 2022-08-31

## 2022-08-31 RX ORDER — SODIUM CHLORIDE 9 MG/ML
INJECTION, SOLUTION INTRAVENOUS CONTINUOUS
Status: CANCELLED | OUTPATIENT
Start: 2022-08-31

## 2022-08-31 RX ADMIN — SODIUM CHLORIDE, PRESERVATIVE FREE 10 ML: 5 INJECTION INTRAVENOUS at 11:29

## 2022-08-31 RX ADMIN — DEXAMETHASONE SODIUM PHOSPHATE 16 MG: 10 INJECTION, SOLUTION INTRAMUSCULAR; INTRAVENOUS at 09:59

## 2022-08-31 RX ADMIN — SODIUM CHLORIDE 100 ML/HR: 9 INJECTION, SOLUTION INTRAVENOUS at 09:59

## 2022-08-31 RX ADMIN — METHOTREXATE 63.25 MG: 25 INJECTION, SOLUTION INTRA-ARTERIAL; INTRAMUSCULAR; INTRAVENOUS at 10:55

## 2022-08-31 RX ADMIN — HEPARIN 500 UNITS: 100 SYRINGE at 11:29

## 2022-09-01 ENCOUNTER — HOSPITAL ENCOUNTER (OUTPATIENT)
Dept: INFUSION THERAPY | Age: 72
Discharge: HOME | End: 2022-09-01
Payer: MEDICARE

## 2022-09-01 VITALS
TEMPERATURE: 97.9 F | OXYGEN SATURATION: 100 % | RESPIRATION RATE: 18 BRPM | WEIGHT: 188.3 LBS | BODY MASS INDEX: 23.41 KG/M2 | SYSTOLIC BLOOD PRESSURE: 140 MMHG | HEART RATE: 73 BPM | DIASTOLIC BLOOD PRESSURE: 72 MMHG | HEIGHT: 75 IN

## 2022-09-01 DIAGNOSIS — C67.9 MALIGNANT NEOPLASM OF URINARY BLADDER, UNSPECIFIED SITE (HCC): ICD-10-CM

## 2022-09-01 DIAGNOSIS — C67.8 MALIGNANT NEOPLASM OF OVERLAPPING SITES OF BLADDER (HCC): Primary | ICD-10-CM

## 2022-09-01 DIAGNOSIS — I87.8 POOR VENOUS ACCESS: ICD-10-CM

## 2022-09-01 DIAGNOSIS — R53.83 FATIGUE DUE TO TREATMENT: ICD-10-CM

## 2022-09-01 PROCEDURE — 6360000002 HC RX W HCPCS: Performed by: INTERNAL MEDICINE

## 2022-09-01 PROCEDURE — 96411 CHEMO IV PUSH ADDL DRUG: CPT

## 2022-09-01 PROCEDURE — 96413 CHEMO IV INFUSION 1 HR: CPT

## 2022-09-01 PROCEDURE — 6360000002 HC RX W HCPCS

## 2022-09-01 PROCEDURE — 96367 TX/PROPH/DG ADDL SEQ IV INF: CPT

## 2022-09-01 PROCEDURE — 96415 CHEMO IV INFUSION ADDL HR: CPT

## 2022-09-01 PROCEDURE — 2500000003 HC RX 250 WO HCPCS

## 2022-09-01 PROCEDURE — 96360 HYDRATION IV INFUSION INIT: CPT

## 2022-09-01 PROCEDURE — 96377 APPLICATON ON-BODY INJECTOR: CPT

## 2022-09-01 PROCEDURE — 96361 HYDRATE IV INFUSION ADD-ON: CPT

## 2022-09-01 PROCEDURE — 96375 TX/PRO/DX INJ NEW DRUG ADDON: CPT

## 2022-09-01 PROCEDURE — 96417 CHEMO IV INFUS EACH ADDL SEQ: CPT

## 2022-09-01 PROCEDURE — 2580000003 HC RX 258: Performed by: INTERNAL MEDICINE

## 2022-09-01 RX ORDER — METHYLPREDNISOLONE SODIUM SUCCINATE 125 MG/2ML
INJECTION, POWDER, LYOPHILIZED, FOR SOLUTION INTRAMUSCULAR; INTRAVENOUS
Status: COMPLETED
Start: 2022-09-01 | End: 2022-09-01

## 2022-09-01 RX ORDER — FAMOTIDINE 10 MG/ML
INJECTION, SOLUTION INTRAVENOUS
Status: COMPLETED
Start: 2022-09-01 | End: 2022-09-01

## 2022-09-01 RX ORDER — FAMOTIDINE 10 MG/ML
20 INJECTION, SOLUTION INTRAVENOUS
Status: COMPLETED | OUTPATIENT
Start: 2022-09-01 | End: 2022-09-01

## 2022-09-01 RX ORDER — DIPHENHYDRAMINE HYDROCHLORIDE 50 MG/ML
50 INJECTION INTRAMUSCULAR; INTRAVENOUS
Status: COMPLETED | OUTPATIENT
Start: 2022-09-01 | End: 2022-09-01

## 2022-09-01 RX ORDER — DIPHENHYDRAMINE HYDROCHLORIDE 50 MG/ML
INJECTION INTRAMUSCULAR; INTRAVENOUS
Status: COMPLETED
Start: 2022-09-01 | End: 2022-09-01

## 2022-09-01 RX ORDER — FUROSEMIDE 10 MG/ML
20 INJECTION INTRAMUSCULAR; INTRAVENOUS ONCE
Status: COMPLETED | OUTPATIENT
Start: 2022-09-01 | End: 2022-09-01

## 2022-09-01 RX ORDER — METHYLPREDNISOLONE SODIUM SUCCINATE 125 MG/2ML
125 INJECTION, POWDER, LYOPHILIZED, FOR SOLUTION INTRAMUSCULAR; INTRAVENOUS ONCE
Status: COMPLETED | OUTPATIENT
Start: 2022-09-01 | End: 2022-09-01

## 2022-09-01 RX ORDER — SODIUM CHLORIDE 0.9 % (FLUSH) 0.9 %
5-40 SYRINGE (ML) INJECTION PRN
Status: DISCONTINUED | OUTPATIENT
Start: 2022-09-01 | End: 2022-09-02 | Stop reason: HOSPADM

## 2022-09-01 RX ORDER — PALONOSETRON 0.05 MG/ML
0.25 INJECTION, SOLUTION INTRAVENOUS ONCE
Status: COMPLETED | OUTPATIENT
Start: 2022-09-01 | End: 2022-09-01

## 2022-09-01 RX ORDER — SODIUM CHLORIDE 9 MG/ML
5-250 INJECTION, SOLUTION INTRAVENOUS PRN
Status: DISCONTINUED | OUTPATIENT
Start: 2022-09-01 | End: 2022-09-02 | Stop reason: HOSPADM

## 2022-09-01 RX ORDER — DOXORUBICIN HYDROCHLORIDE 2 MG/ML
60 INJECTION, SOLUTION INTRAVENOUS ONCE
Status: COMPLETED | OUTPATIENT
Start: 2022-09-01 | End: 2022-09-01

## 2022-09-01 RX ADMIN — FAMOTIDINE 20 MG: 10 INJECTION, SOLUTION INTRAVENOUS at 12:20

## 2022-09-01 RX ADMIN — FOSAPREPITANT 150 MG: 150 INJECTION, POWDER, LYOPHILIZED, FOR SOLUTION INTRAVENOUS at 09:13

## 2022-09-01 RX ADMIN — SODIUM CHLORIDE 148 MG: 0.9 INJECTION, SOLUTION INTRAVENOUS at 11:05

## 2022-09-01 RX ADMIN — METHYLPREDNISOLONE SODIUM SUCCINATE 125 MG: 125 INJECTION, POWDER, LYOPHILIZED, FOR SOLUTION INTRAMUSCULAR; INTRAVENOUS at 12:20

## 2022-09-01 RX ADMIN — VINBLASTINE SULFATE 6 MG: 1 INJECTION INTRAVENOUS at 09:55

## 2022-09-01 RX ADMIN — FAMOTIDINE 20 MG: 10 INJECTION INTRAVENOUS at 12:20

## 2022-09-01 RX ADMIN — DOXORUBICIN HYDROCHLORIDE 60 MG: 2 INJECTION, SOLUTION INTRAVENOUS at 10:35

## 2022-09-01 RX ADMIN — POTASSIUM CHLORIDE: 2 INJECTION, SOLUTION, CONCENTRATE INTRAVENOUS at 12:14

## 2022-09-01 RX ADMIN — DEXAMETHASONE SODIUM PHOSPHATE: 10 INJECTION, SOLUTION INTRAMUSCULAR; INTRAVENOUS at 09:00

## 2022-09-01 RX ADMIN — FUROSEMIDE 20 MG: 10 INJECTION, SOLUTION INTRAMUSCULAR; INTRAVENOUS at 12:13

## 2022-09-01 RX ADMIN — DIPHENHYDRAMINE HYDROCHLORIDE 50 MG: 50 INJECTION INTRAMUSCULAR; INTRAVENOUS at 12:19

## 2022-09-01 RX ADMIN — PALONOSETRON 0.25 MG: 0.05 INJECTION, SOLUTION INTRAVENOUS at 08:53

## 2022-09-01 RX ADMIN — METHYLPREDNISOLONE SODIUM SUCCINATE 125 MG: 125 INJECTION, POWDER, FOR SOLUTION INTRAMUSCULAR; INTRAVENOUS at 12:20

## 2022-09-01 RX ADMIN — DIPHENHYDRAMINE HYDROCHLORIDE 50 MG: 50 INJECTION, SOLUTION INTRAMUSCULAR; INTRAVENOUS at 12:19

## 2022-09-01 RX ADMIN — PEGFILGRASTIM 6 MG: KIT SUBCUTANEOUS at 14:51

## 2022-09-01 RX ADMIN — SODIUM CHLORIDE 100 ML/HR: 9 INJECTION, SOLUTION INTRAVENOUS at 08:53

## 2022-09-01 RX ADMIN — POTASSIUM CHLORIDE: 2 INJECTION, SOLUTION, CONCENTRATE INTRAVENOUS at 11:05

## 2022-09-01 NOTE — PROGRESS NOTES
Pt here for D1 C1 of 4, Dose dense methotrexate, vinblastine, doxorubicin, cisplatin, neulasta. Prior to beginning administration of doxorubicin, peripheral IV is with good blood return. Checked blood return again after administering 3 ml of the doxorubicin & was unable to obtain. Informed pt that a new IV would need to be started. Pt became quite nervous. After 2 attempts, Gibran Lopez RN, was able to obtain new peripheral site with good blood return. Pt did shake uncontrollably due to anxiety for about 10 more minutes. He expresses concern about having to go through this process over again for future cycles. Notified Dr. Les Rosario of this, he suggests PICC line placement. Discussed PICC line placement & care involved with pt & wife; he states he does not want PICC line because it would require weekly trips here for dressing changes & would need to be flushed daily; would rather have port placement. Notified Dr. Les Rosario of pt wishes; order received to schedule pt for port placement. At approx. 1200, pt got up to void, immediately c/o severe back pain. Cisplatin stopped, C Michele pulled rescue meds benadryl 50 mg IVP, Solu-medrol 125 mg IVP, Pepcid 20 mg IVP. All meds given. Within a few minutes, approx. 1205, pt states back pain is no longer severe, but has begun to have tremors. Warm blankets placed on pt. He no longer c/o back pain. Temp 98.0, HR 56, RR 18, /67, SpO2 95%.  1300: Pt denies back pain, is no longer shaking, reports feeling 'fine'. Cisplatin restarted at 125 ml/hr. 1320: Pt continues to deny back pain, no other complaints. Cisplatin increased to original rate of 259 ml/hr. 1400: Dr. Les Rosario to room to see pt; all symptoms have resolved, Cisplatin continues to run with no issues or complaints from pt.

## 2022-09-06 ENCOUNTER — OFFICE VISIT (OUTPATIENT)
Dept: SURGERY | Age: 72
End: 2022-09-06
Payer: MEDICARE

## 2022-09-06 VITALS
BODY MASS INDEX: 22.48 KG/M2 | WEIGHT: 180.8 LBS | HEIGHT: 75 IN | TEMPERATURE: 98.1 F | SYSTOLIC BLOOD PRESSURE: 116 MMHG | DIASTOLIC BLOOD PRESSURE: 68 MMHG

## 2022-09-06 DIAGNOSIS — C67.9 MALIGNANT NEOPLASM OF URINARY BLADDER, UNSPECIFIED SITE (HCC): ICD-10-CM

## 2022-09-06 DIAGNOSIS — C67.8 MALIGNANT NEOPLASM OF OVERLAPPING SITES OF BLADDER (HCC): Primary | ICD-10-CM

## 2022-09-06 PROCEDURE — 99202 OFFICE O/P NEW SF 15 MIN: CPT | Performed by: SURGERY

## 2022-09-06 PROCEDURE — 1123F ACP DISCUSS/DSCN MKR DOCD: CPT | Performed by: SURGERY

## 2022-09-06 RX ORDER — SODIUM CHLORIDE 0.9 % (FLUSH) 0.9 %
5-40 SYRINGE (ML) INJECTION PRN
Status: CANCELLED | OUTPATIENT
Start: 2022-09-06

## 2022-09-06 RX ORDER — SODIUM CHLORIDE 9 MG/ML
INJECTION, SOLUTION INTRAVENOUS PRN
Status: CANCELLED | OUTPATIENT
Start: 2022-09-06

## 2022-09-06 RX ORDER — SODIUM CHLORIDE 0.9 % (FLUSH) 0.9 %
5-40 SYRINGE (ML) INJECTION EVERY 12 HOURS SCHEDULED
Status: CANCELLED | OUTPATIENT
Start: 2022-09-06

## 2022-09-06 ASSESSMENT — ENCOUNTER SYMPTOMS
COUGH: 0
NAUSEA: 0
FACIAL SWELLING: 0
EYE PAIN: 0
CONSTIPATION: 0
CHEST TIGHTNESS: 0
EYE REDNESS: 0
ABDOMINAL PAIN: 0
SHORTNESS OF BREATH: 0
EYE DISCHARGE: 0
DIARRHEA: 0
WHEEZING: 0

## 2022-09-06 NOTE — PROGRESS NOTES
SUBJECTIVE:  Mr. Miracle Rojas is a 67 y.o. male who presents today with bladder cancer. Patient presenting today to discuss insertion of a PowerPort. Patient recently started on chemotherapy. Having difficulty with venous access. Denies history of MI or stroke. Past Medical History:   Diagnosis Date    COPD (chronic obstructive pulmonary disease) ([facility] [address]     Nocturia 6/27/2019    Positive colorectal cancer screening using Cologuard test 10/8/2019     Past Surgical History:   Procedure Laterality Date    BLADDER TUMOR EXCISION      CATARACT REMOVAL Bilateral     COLONOSCOPY N/A 11/7/2019    Dr DAYANA Eddy-Tubulovillous AP, (-) dysplasia x 1, tubular AP, (-) dysplasia x 4, BCM x 1, 3 yr recall    CYSTOSCOPY N/A 6/29/2022    CYSTOSCOPY TRANSURETHRAL RESECTION BLADDER TUMOR GREATER THAN 5CM performed by Lalita Dodd MD at [address] [place] [address] Bilateral 6/29/2022    BILATERAL URETERAL CATHETERIZATION BILATERAL RETROGRADE PYELOGRAM performed by Lalita Dodd MD at [address] [facility] Right 6/29/2022    RIGHT URETERAL STENT INSERTION performed by Lalita Dodd MD at [facility] Right     shoulder, ball and joint    HUMERUS FRACTURE SURGERY Right     LEG SURGERY Right     femur fracture    SHOULDER SURGERY Right 1965    shoulder fracture, ORIF, football injury     Current Outpatient Medications   Medication Sig Dispense Refill    Ondansetron HCl (ZOFRAN PO) Take by mouth       No current facility-administered medications for this visit. Allergies: Patient has no known allergies.     Family History   Problem Relation Age of Onset    Diabetes Mother     Colon Polyps Mother     Cancer Father         Lung    Lung Cancer Father     No Known Problems Sister     Other Brother         disability with back    No Known Problems Maternal Grandmother     Heart Disease Maternal Grandfather     No Known Problems Paternal Grandmother     No Known Problems Paternal Grandfather     Pancreatic Cancer Daughter     Diabetes type 2  Daughter     Thyroid Disease Daughter     High Cholesterol Daughter     Colon Cancer Neg Hx     Esophageal Cancer Neg Hx     Liver Cancer Neg Hx     Liver Disease Neg Hx     Rectal Cancer Neg Hx     Stomach Cancer Neg Hx        Social History     Tobacco Use    Smoking status: Every Day     Packs/day: 0.50     Years: 47.00     Pack years: 23.50     Types: Cigarettes    Smokeless tobacco: Never   Substance Use Topics    Alcohol use: Yes     Alcohol/week: 14.0 standard drinks     Types: 14 Cans of beer per week     Comment: 2 beer per night       Review of Systems   Constitutional:  Negative for chills, diaphoresis, fatigue and fever. HENT:  Negative for ear discharge, ear pain, facial swelling and nosebleeds. Eyes:  Negative for pain, discharge and redness. Respiratory:  Negative for cough, chest tightness, shortness of breath and wheezing. Cardiovascular:  Negative for chest pain and palpitations. Gastrointestinal:  Negative for abdominal pain, constipation, diarrhea and nausea. Genitourinary:  Negative for difficulty urinating, flank pain and hematuria. Musculoskeletal:  Negative for neck pain and neck stiffness. Neurological:  Negative for dizziness, numbness and headaches. Psychiatric/Behavioral:  Negative for agitation, behavioral problems and self-injury. OBJECTIVE:  /68 (Site: Left Upper Arm, Position: Sitting, Cuff Size: Medium Adult)   Temp 98.1 °F (36.7 °C) (Temporal)   Ht 6' 3\" (1.905 m)   Wt 180 lb 12.8 oz (82 kg)   BMI 22.60 kg/m²   Physical Exam  Vitals reviewed. Constitutional:       Appearance: Normal appearance. HENT:      Head: Normocephalic and atraumatic. Right Ear: External ear normal.      Left Ear: External ear normal.   Eyes:      Extraocular Movements: Extraocular movements intact. Pulmonary:      Effort: Pulmonary effort is normal. No respiratory distress. Abdominal:      General: There is no distension.

## 2022-09-06 NOTE — H&P
SUBJECTIVE:  Mr. Armand Whalen is a 67 y.o. male who presents today with bladder cancer. Patient presenting today to discuss insertion of a PowerPort. Patient recently started on chemotherapy. Having difficulty with venous access. Denies history of MI or stroke. Past Medical History:   Diagnosis Date    COPD (chronic obstructive pulmonary disease) ([facility] [address]     Nocturia 6/27/2019    Positive colorectal cancer screening using Cologuard test 10/8/2019     Past Surgical History:   Procedure Laterality Date    BLADDER TUMOR EXCISION      CATARACT REMOVAL Bilateral     COLONOSCOPY N/A 11/7/2019    Dr DAYANA Eddy-Tubulovillous AP, (-) dysplasia x 1, tubular AP, (-) dysplasia x 4, BCM x 1, 3 yr recall    CYSTOSCOPY N/A 6/29/2022    CYSTOSCOPY TRANSURETHRAL RESECTION BLADDER TUMOR GREATER THAN 5CM performed by Charo Hammond MD at [facility]. [address] Bilateral 6/29/2022    BILATERAL URETERAL CATHETERIZATION BILATERAL RETROGRADE PYELOGRAM performed by Charo Hammond MD at [facility]. [address] Right 6/29/2022    RIGHT URETERAL STENT INSERTION performed by Charo Hammond MD at [address] [place] [address] Right     shoulder, ball and joint    HUMERUS FRACTURE SURGERY Right     LEG SURGERY Right     femur fracture    SHOULDER SURGERY Right 1965    shoulder fracture, ORIF, football injury     Current Outpatient Medications   Medication Sig Dispense Refill    Ondansetron HCl (ZOFRAN PO) Take by mouth       No current facility-administered medications for this visit. Allergies: Patient has no known allergies.     Family History   Problem Relation Age of Onset    Diabetes Mother     Colon Polyps Mother     Cancer Father         Lung    Lung Cancer Father     No Known Problems Sister     Other Brother         disability with back    No Known Problems Maternal Grandmother     Heart Disease Maternal Grandfather     No Known Problems Paternal Grandmother     No Known Problems Paternal Grandfather     Pancreatic Cancer Daughter     Diabetes type 2  Daughter     Thyroid Disease Daughter     High Cholesterol Daughter     Colon Cancer Neg Hx     Esophageal Cancer Neg Hx     Liver Cancer Neg Hx     Liver Disease Neg Hx     Rectal Cancer Neg Hx     Stomach Cancer Neg Hx        Social History     Tobacco Use    Smoking status: Every Day     Packs/day: 0.50     Years: 47.00     Pack years: 23.50     Types: Cigarettes    Smokeless tobacco: Never   Substance Use Topics    Alcohol use: Yes     Alcohol/week: 14.0 standard drinks     Types: 14 Cans of beer per week     Comment: 2 beer per night       Review of Systems   Constitutional:  Negative for chills, diaphoresis, fatigue and fever. HENT:  Negative for ear discharge, ear pain, facial swelling and nosebleeds. Eyes:  Negative for pain, discharge and redness. Respiratory:  Negative for cough, chest tightness, shortness of breath and wheezing. Cardiovascular:  Negative for chest pain and palpitations. Gastrointestinal:  Negative for abdominal pain, constipation, diarrhea and nausea. Genitourinary:  Negative for difficulty urinating, flank pain and hematuria. Musculoskeletal:  Negative for neck pain and neck stiffness. Neurological:  Negative for dizziness, numbness and headaches. Psychiatric/Behavioral:  Negative for agitation, behavioral problems and self-injury. OBJECTIVE:  /68 (Site: Left Upper Arm, Position: Sitting, Cuff Size: Medium Adult)   Temp 98.1 °F (36.7 °C) (Temporal)   Ht 6' 3\" (1.905 m)   Wt 180 lb 12.8 oz (82 kg)   BMI 22.60 kg/m²   Physical Exam  Vitals reviewed. Constitutional:       Appearance: Normal appearance. HENT:      Head: Normocephalic and atraumatic. Right Ear: External ear normal.      Left Ear: External ear normal.   Eyes:      Extraocular Movements: Extraocular movements intact. Pulmonary:      Effort: Pulmonary effort is normal. No respiratory distress. Abdominal:      General: There is no distension. Palpations: Abdomen is soft. Tenderness: There is no abdominal tenderness. There is no guarding or rebound. Musculoskeletal:         General: Normal range of motion. Skin:     General: Skin is warm and dry. Neurological:      General: No focal deficit present. Mental Status: He is alert and oriented to person, place, and time. Psychiatric:         Mood and Affect: Mood normal.         Behavior: Behavior normal.         Thought Content: Thought content normal.       ASSESSMENT:   Diagnosis Orders   1. Malignant neoplasm of overlapping sites of bladder ([facility] [address]        2. Malignant neoplasm of urinary bladder, unspecified site [facility])            PLAN:  No orders of the defined types were placed in this encounter. No orders of the defined types were placed in this encounter. The risks, benefits, and alternatives were discussed with the pt. He is willing to accept the risks and proceed with a Port insertion. The surgical risks included but not limited to bleeding, infection, perforation, recurrence, risk of needing further surgery, etc. The anesthetic risks included heart attacks, strokes, pneumonia, phlebitis, etc.  He is willing to accept all risks and proceed with surgery. No guarantees have been given. No follow-ups on file.     Mahnaz Mccarthy DO 9/6/2022 4:53 PM

## 2022-09-06 NOTE — LETTER
SCHEDULING INSTRUCTIONS:     Patient: Bianca Oviedo  : 1950    Hospital: [facility]    Admitting Physician:  Dr. Kevin Landa    Diagnosis: Bladder Cancer    Procedure: Port insertion    ALLOW ADDITIONAL 30 MINS FOR TURNOVER EXCEPT FOR PHYSICIAN'S BOUNCE DAY    Anesthesia: MAC/Local    Admission:Outpatient     Date: 2022          [unfilled]     NOT TO BE USED OUTSIDE OF THE OFFICE

## 2022-09-06 NOTE — H&P (VIEW-ONLY)
SUBJECTIVE:  Mr. Uriel Núñez is a 67 y.o. male who presents today with bladder cancer. Patient presenting today to discuss insertion of a PowerPort. Patient recently started on chemotherapy. Having difficulty with venous access. Denies history of MI or stroke. Past Medical History:   Diagnosis Date    COPD (chronic obstructive pulmonary disease) ([facility] [address]     Nocturia 6/27/2019    Positive colorectal cancer screening using Cologuard test 10/8/2019     Past Surgical History:   Procedure Laterality Date    BLADDER TUMOR EXCISION      CATARACT REMOVAL Bilateral     COLONOSCOPY N/A 11/7/2019    Dr DAYANA Eddy-Tubulovillous AP, (-) dysplasia x 1, tubular AP, (-) dysplasia x 4, BCM x 1, 3 yr recall    CYSTOSCOPY N/A 6/29/2022    CYSTOSCOPY TRANSURETHRAL RESECTION BLADDER TUMOR GREATER THAN 5CM performed by Robert Reynolds MD at [address] [facility] Bilateral 6/29/2022    BILATERAL URETERAL CATHETERIZATION BILATERAL RETROGRADE PYELOGRAM performed by Robert Reynolds MD at [address] [facility] Right 6/29/2022    RIGHT URETERAL STENT INSERTION performed by Robert Reynolds MD at [address] [facility] Right     shoulder, ball and joint    HUMERUS FRACTURE SURGERY Right     LEG SURGERY Right     femur fracture    SHOULDER SURGERY Right 1965    shoulder fracture, ORIF, football injury     Current Outpatient Medications   Medication Sig Dispense Refill    Ondansetron HCl (ZOFRAN PO) Take by mouth       No current facility-administered medications for this visit. Allergies: Patient has no known allergies.     Family History   Problem Relation Age of Onset    Diabetes Mother     Colon Polyps Mother     Cancer Father         Lung    Lung Cancer Father     No Known Problems Sister     Other Brother         disability with back    No Known Problems Maternal Grandmother     Heart Disease Maternal Grandfather     No Known Problems Paternal Grandmother     No Known Problems Paternal Grandfather     Pancreatic Cancer Daughter     Diabetes type 2  Daughter     Thyroid Disease Daughter     High Cholesterol Daughter     Colon Cancer Neg Hx     Esophageal Cancer Neg Hx     Liver Cancer Neg Hx     Liver Disease Neg Hx     Rectal Cancer Neg Hx     Stomach Cancer Neg Hx        Social History     Tobacco Use    Smoking status: Every Day     Packs/day: 0.50     Years: 47.00     Pack years: 23.50     Types: Cigarettes    Smokeless tobacco: Never   Substance Use Topics    Alcohol use: Yes     Alcohol/week: 14.0 standard drinks     Types: 14 Cans of beer per week     Comment: 2 beer per night       Review of Systems   Constitutional:  Negative for chills, diaphoresis, fatigue and fever. HENT:  Negative for ear discharge, ear pain, facial swelling and nosebleeds. Eyes:  Negative for pain, discharge and redness. Respiratory:  Negative for cough, chest tightness, shortness of breath and wheezing. Cardiovascular:  Negative for chest pain and palpitations. Gastrointestinal:  Negative for abdominal pain, constipation, diarrhea and nausea. Genitourinary:  Negative for difficulty urinating, flank pain and hematuria. Musculoskeletal:  Negative for neck pain and neck stiffness. Neurological:  Negative for dizziness, numbness and headaches. Psychiatric/Behavioral:  Negative for agitation, behavioral problems and self-injury. OBJECTIVE:  /68 (Site: Left Upper Arm, Position: Sitting, Cuff Size: Medium Adult)   Temp 98.1 °F (36.7 °C) (Temporal)   Ht 6' 3\" (1.905 m)   Wt 180 lb 12.8 oz (82 kg)   BMI 22.60 kg/m²   Physical Exam  Vitals reviewed. Constitutional:       Appearance: Normal appearance. HENT:      Head: Normocephalic and atraumatic. Right Ear: External ear normal.      Left Ear: External ear normal.   Eyes:      Extraocular Movements: Extraocular movements intact. Pulmonary:      Effort: Pulmonary effort is normal. No respiratory distress. Abdominal:      General: There is no distension. Palpations: Abdomen is soft. Tenderness: There is no abdominal tenderness. There is no guarding or rebound. Musculoskeletal:         General: Normal range of motion. Skin:     General: Skin is warm and dry. Neurological:      General: No focal deficit present. Mental Status: He is alert and oriented to person, place, and time. Psychiatric:         Mood and Affect: Mood normal.         Behavior: Behavior normal.         Thought Content: Thought content normal.       ASSESSMENT:   Diagnosis Orders   1. Malignant neoplasm of overlapping sites of bladder ([facility] [address]        2. Malignant neoplasm of urinary bladder, unspecified site [facility])            PLAN:  No orders of the defined types were placed in this encounter. No orders of the defined types were placed in this encounter. The risks, benefits, and alternatives were discussed with the pt. He is willing to accept the risks and proceed with a Port insertion. The surgical risks included but not limited to bleeding, infection, perforation, recurrence, risk of needing further surgery, etc. The anesthetic risks included heart attacks, strokes, pneumonia, phlebitis, etc.  He is willing to accept all risks and proceed with surgery. No guarantees have been given. No follow-ups on file.     Yaneth Edwards DO 9/6/2022 4:53 PM

## 2022-09-07 ENCOUNTER — ANESTHESIA EVENT (OUTPATIENT)
Dept: OPERATING ROOM | Age: 72
End: 2022-09-07

## 2022-09-09 ENCOUNTER — HOSPITAL ENCOUNTER (OUTPATIENT)
Dept: GENERAL RADIOLOGY | Age: 72
Setting detail: OUTPATIENT SURGERY
Discharge: HOME | End: 2022-09-09
Payer: MEDICARE

## 2022-09-09 ENCOUNTER — ANESTHESIA (OUTPATIENT)
Dept: OPERATING ROOM | Age: 72
End: 2022-09-09

## 2022-09-09 ENCOUNTER — HOSPITAL ENCOUNTER (OUTPATIENT)
Age: 72
Setting detail: OUTPATIENT SURGERY
Discharge: HOME | End: 2022-09-09
Attending: SURGERY | Admitting: SURGERY
Payer: MEDICARE

## 2022-09-09 ENCOUNTER — HOSPITAL ENCOUNTER (OUTPATIENT)
Dept: GENERAL RADIOLOGY | Age: 72
Discharge: HOME | End: 2022-09-09
Payer: MEDICARE

## 2022-09-09 VITALS
SYSTOLIC BLOOD PRESSURE: 111 MMHG | TEMPERATURE: 97.2 F | WEIGHT: 180 LBS | DIASTOLIC BLOOD PRESSURE: 57 MMHG | BODY MASS INDEX: 22.38 KG/M2 | HEIGHT: 75 IN | RESPIRATION RATE: 14 BRPM | OXYGEN SATURATION: 98 % | HEART RATE: 54 BPM

## 2022-09-09 DIAGNOSIS — C67.9 MALIGNANT NEOPLASM OF URINARY BLADDER, UNSPECIFIED SITE (HCC): Primary | ICD-10-CM

## 2022-09-09 PROCEDURE — 36558 INSERT TUNNELED CV CATH: CPT

## 2022-09-09 PROCEDURE — 77001 FLUOROGUIDE FOR VEIN DEVICE: CPT | Performed by: SURGERY

## 2022-09-09 PROCEDURE — C1788 PORT, INDWELLING, IMP: HCPCS | Performed by: SURGERY

## 2022-09-09 PROCEDURE — 3209999900 FLUORO FOR SURGICAL PROCEDURES

## 2022-09-09 PROCEDURE — 36561 INSERT TUNNELED CV CATH: CPT | Performed by: SURGERY

## 2022-09-09 PROCEDURE — 36561 INSERT TUNNELED CV CATH: CPT

## 2022-09-09 PROCEDURE — 71045 X-RAY EXAM CHEST 1 VIEW: CPT | Performed by: RADIOLOGY

## 2022-09-09 PROCEDURE — 71045 X-RAY EXAM CHEST 1 VIEW: CPT

## 2022-09-09 PROCEDURE — 76937 US GUIDE VASCULAR ACCESS: CPT | Performed by: SURGERY

## 2022-09-09 DEVICE — PORT INFUS PLAS SGL LUMN W/ 9.6FR SIL CATH AIRGUARD VLV: Type: IMPLANTABLE DEVICE | Site: CHEST | Status: FUNCTIONAL

## 2022-09-09 RX ORDER — SODIUM CHLORIDE 0.9 % (FLUSH) 0.9 %
5-40 SYRINGE (ML) INJECTION EVERY 12 HOURS SCHEDULED
Status: DISCONTINUED | OUTPATIENT
Start: 2022-09-09 | End: 2022-09-09 | Stop reason: HOSPADM

## 2022-09-09 RX ORDER — SODIUM CHLORIDE, SODIUM LACTATE, POTASSIUM CHLORIDE, CALCIUM CHLORIDE 600; 310; 30; 20 MG/100ML; MG/100ML; MG/100ML; MG/100ML
INJECTION, SOLUTION INTRAVENOUS CONTINUOUS PRN
Status: DISCONTINUED | OUTPATIENT
Start: 2022-09-09 | End: 2022-09-09 | Stop reason: SDUPTHER

## 2022-09-09 RX ORDER — LIDOCAINE HYDROCHLORIDE 10 MG/ML
INJECTION, SOLUTION EPIDURAL; INFILTRATION; INTRACAUDAL; PERINEURAL PRN
Status: DISCONTINUED | OUTPATIENT
Start: 2022-09-09 | End: 2022-09-09 | Stop reason: SDUPTHER

## 2022-09-09 RX ORDER — HEPARIN SODIUM (PORCINE) LOCK FLUSH IV SOLN 100 UNIT/ML 100 UNIT/ML
SOLUTION INTRAVENOUS PRN
Status: DISCONTINUED | OUTPATIENT
Start: 2022-09-09 | End: 2022-09-09 | Stop reason: ALTCHOICE

## 2022-09-09 RX ORDER — PROPOFOL 10 MG/ML
INJECTION, EMULSION INTRAVENOUS PRN
Status: DISCONTINUED | OUTPATIENT
Start: 2022-09-09 | End: 2022-09-09 | Stop reason: SDUPTHER

## 2022-09-09 RX ORDER — HYDROCODONE BITARTRATE AND ACETAMINOPHEN 5; 325 MG/1; MG/1
1 TABLET ORAL EVERY 6 HOURS PRN
Qty: 12 TABLET | Refills: 0 | Status: SHIPPED | OUTPATIENT
Start: 2022-09-09 | End: 2022-09-12

## 2022-09-09 RX ORDER — SODIUM CHLORIDE 0.9 % (FLUSH) 0.9 %
5-40 SYRINGE (ML) INJECTION PRN
Status: DISCONTINUED | OUTPATIENT
Start: 2022-09-09 | End: 2022-09-09 | Stop reason: HOSPADM

## 2022-09-09 RX ORDER — PROPOFOL 10 MG/ML
INJECTION, EMULSION INTRAVENOUS CONTINUOUS PRN
Status: DISCONTINUED | OUTPATIENT
Start: 2022-09-09 | End: 2022-09-09 | Stop reason: SDUPTHER

## 2022-09-09 RX ORDER — SODIUM CHLORIDE, SODIUM LACTATE, POTASSIUM CHLORIDE, CALCIUM CHLORIDE 600; 310; 30; 20 MG/100ML; MG/100ML; MG/100ML; MG/100ML
INJECTION, SOLUTION INTRAVENOUS CONTINUOUS
Status: DISCONTINUED | OUTPATIENT
Start: 2022-09-09 | End: 2022-09-09 | Stop reason: HOSPADM

## 2022-09-09 RX ORDER — SODIUM CHLORIDE 9 MG/ML
INJECTION, SOLUTION INTRAVENOUS PRN
Status: DISCONTINUED | OUTPATIENT
Start: 2022-09-09 | End: 2022-09-09 | Stop reason: HOSPADM

## 2022-09-09 RX ORDER — BUPIVACAINE HYDROCHLORIDE AND EPINEPHRINE 2.5; 5 MG/ML; UG/ML
INJECTION, SOLUTION INFILTRATION; PERINEURAL PRN
Status: DISCONTINUED | OUTPATIENT
Start: 2022-09-09 | End: 2022-09-09 | Stop reason: ALTCHOICE

## 2022-09-09 RX ORDER — FENTANYL CITRATE 50 UG/ML
INJECTION, SOLUTION INTRAMUSCULAR; INTRAVENOUS PRN
Status: DISCONTINUED | OUTPATIENT
Start: 2022-09-09 | End: 2022-09-09 | Stop reason: SDUPTHER

## 2022-09-09 RX ADMIN — FENTANYL CITRATE 50 MCG: 50 INJECTION, SOLUTION INTRAMUSCULAR; INTRAVENOUS at 09:23

## 2022-09-09 RX ADMIN — SODIUM CHLORIDE, SODIUM LACTATE, POTASSIUM CHLORIDE, CALCIUM CHLORIDE: 600; 310; 30; 20 INJECTION, SOLUTION INTRAVENOUS at 09:20

## 2022-09-09 RX ADMIN — LIDOCAINE HYDROCHLORIDE 50 MG: 10 INJECTION, SOLUTION EPIDURAL; INFILTRATION; INTRACAUDAL; PERINEURAL at 09:23

## 2022-09-09 RX ADMIN — PROPOFOL 50 MG: 10 INJECTION, EMULSION INTRAVENOUS at 09:24

## 2022-09-09 RX ADMIN — FENTANYL CITRATE 50 MCG: 50 INJECTION, SOLUTION INTRAMUSCULAR; INTRAVENOUS at 10:02

## 2022-09-09 RX ADMIN — SODIUM CHLORIDE, SODIUM LACTATE, POTASSIUM CHLORIDE, CALCIUM CHLORIDE: 600; 310; 30; 20 INJECTION, SOLUTION INTRAVENOUS at 08:05

## 2022-09-09 RX ADMIN — PROPOFOL 180 MCG/KG/MIN: 10 INJECTION, EMULSION INTRAVENOUS at 09:24

## 2022-09-09 ASSESSMENT — PAIN - FUNCTIONAL ASSESSMENT: PAIN_FUNCTIONAL_ASSESSMENT: 0-10

## 2022-09-09 ASSESSMENT — ENCOUNTER SYMPTOMS: SHORTNESS OF BREATH: 0

## 2022-09-09 ASSESSMENT — LIFESTYLE VARIABLES: SMOKING_STATUS: 1

## 2022-09-09 NOTE — OP NOTE
Operative Note      Patient: Christina Andrews  YOB: 1950  MRN: [id number]    Date of Procedure: 9/9/2022    Pre-Op Diagnosis: Malignant neoplasm of urinary bladder, unspecified site ([facility] [C67.9]    Post-Op Diagnosis: Same       Procedure(s):  PORT INSERTION    Surgeon(s):  Mahnaz Mccarthy DO    Assistant:   * No surgical staff found *    Anesthesia: Monitor Anesthesia Care    Estimated Blood Loss (mL): Minimal    Complications: None    Specimens:   * No specimens in log *    Implants:  Implant Name Type Inv. Item Serial No.  Lot No. LRB No. Used Action   PORT INFUS PLAS SGL LUMN W/ 9.6FR MECCA CATH AIRGUARD VLV - [id number]  PORT INFUS PLAS SGL LUMN W/ 9.6FR MECCA CATH AIRGUARD VLV  [id number] 1 Implanted             Patient was taken to the main operating room and placed on the operating table supine. After the administration of appropriate IV sedation, the upper  chest and neck were prepped and draped to a sterile field. Timeout was undertaken. The RIJ vein was cannulated on the 1st pass with an 18-gauge thin-wall needle via sonogram guidance. Through this a flexible-tipped guidewire was advanced without problem. Appropriate positioning was confirmed by fluoroscopy. A small transverse incision was then made in the skin of the right upper chest and a subcutaneous pocket created for the port reservoir. A Power Port had been selected and this was delivered onto the field. It was then assembled to the port reservoir and locked in place with the locking ring. Each lumen was flushed with heparin saline. The catheter was then attached to the tunneler and pulled it through the subcutaneous tunnel, from the chest to the neck, without problem. The reservoir was placed in its pocket and secured to the deep tissue with interrupted 2-0 PDS suture. The catheter was measured and cut to appropriate length and cut at 25.5 cm.     A peel-away sheath and dilator assembly were advanced over the guidewire and the guidewire and dilator removed. The catheter was then advanced through the peel-away sheath and the peel-away sheath removed without problem. Appropriate positioning was confirmed by fluoroscopy. There was good blood return and easy flush of heparin saline through each lumen of the port. The small incision at the cannulation site was closed with a single 4-0 Monocryl subcuticular suture followed by Dermabond dressing. The main incision was closed with interrupted 3-0 Vicryl subcuticular suture followed by Dermabond as well. Sponge, needle and instrument count correct on 2 occasions. Estimated intraoperative blood loss minimal.    Mr. Lars Anne tolerated his surgery well and he was taken to PACU in satisfactory condition.       ________________________________  Alfa Bird DO      Electronically signed by Alfa Bird DO on 9/9/2022 at 10:14 AM

## 2022-09-09 NOTE — ANESTHESIA PRE PROCEDURE
Department of Anesthesiology  Preprocedure Note       Name:  Natalie Morgan   Age:  67 y.o.  :  1950                                          MRN:  [id number]         Date:  2022      Surgeon: Willian Beltran):  [address] [place] [address],     Procedure: Procedure(s):  PORT INSERTION    Medications prior to admission:   Prior to Admission medications    Medication Sig Start Date End Date Taking? Authorizing Provider   Ondansetron HCl (ZOFRAN PO) Take by mouth    Historical Provider, MD       Current medications:    No current facility-administered medications for this visit. No current outpatient medications on file.      Facility-Administered Medications Ordered in Other Visits   Medication Dose Route Frequency Provider Last Rate Last Admin    sodium chloride flush 0.9 % injection 5-40 mL  5-40 mL IntraVENous 2 times per day Dustin Bhatti,         sodium chloride flush 0.9 % injection 5-40 mL  5-40 mL IntraVENous PRN [address] [place] [address], DO        0.9 % sodium chloride infusion   IntraVENous PRN [address] [place] [address], DO        ceFAZolin (ANCEF) 2,000 mg in dextrose 5 % 100 mL IVPB  2,000 mg IntraVENous On Call to [address] [place] [address], DO        lactated ringers infusion   IntraVENous Continuous Solorio Tami, APRN - CRNA 100 mL/hr at 22 0805 New Bag at 22 0805       Allergies:  No Known Allergies    Problem List:    Patient Active Problem List   Diagnosis Code    Cigarette nicotine dependence without complication N69.729    Family history of colonic polyps Z83.71    Chronic low back pain with left-sided sciatica M54.42, G89.29    Colorectal polyps K63.5    [facility] lung disease ([facility] J44.9    Lung cancer screening declined by patient Z53.20    History of tobacco abuse Z87.891    Compression fracture of L1 vertebra with routine healing S32.010D    Compression fracture of L2 vertebra with routine healing S32.020D    Malignant neoplasm of overlapping sites of bladder ([facility] C67.8  Bladder cancer ([facility] C67.9       Past Medical History:        Diagnosis Date    COPD (chronic obstructive pulmonary disease) ([facility]     Nocturia 6/27/2019    Positive colorectal cancer screening using Cologuard test 10/8/2019       Past Surgical History:        Procedure Laterality Date    BLADDER TUMOR EXCISION      CATARACT REMOVAL Bilateral     COLONOSCOPY N/A 11/7/2019    Dr DAYANA Eddy-Tubulovillous AP, (-) dysplasia x 1, tubular AP, (-) dysplasia x 4, BCM x 1, 3 yr recall    CYSTOSCOPY N/A 6/29/2022    CYSTOSCOPY TRANSURETHRAL RESECTION BLADDER TUMOR GREATER THAN 5CM performed by Devin Arshad MD at [facility] Bilateral 6/29/2022    BILATERAL URETERAL CATHETERIZATION BILATERAL RETROGRADE PYELOGRAM performed by Devin Arshad MD at [facility] Right 6/29/2022    RIGHT URETERAL STENT INSERTION performed by Devin Arshad MD at [address] [place] [address] Right     shoulder, ball and joint    HUMERUS FRACTURE SURGERY Right     LEG SURGERY Right     femur fracture    SHOULDER SURGERY Right 1965    shoulder fracture, ORIF, football injury       Social History:    Social History     Tobacco Use    Smoking status: Every Day     Packs/day: 0.50     Years: 47.00     Pack years: 23.50     Types: Cigarettes    Smokeless tobacco: Never   Substance Use Topics    Alcohol use: Yes     Alcohol/week: 14.0 standard drinks     Types: 14 Cans of beer per week     Comment: 2 beer per night                                Ready to quit: Not Answered  Counseling given: Not Answered      Vital Signs (Current): There were no vitals filed for this visit.                                            BP Readings from Last 3 Encounters:   09/09/22 135/76   09/06/22 116/68   09/01/22 (!) 140/72       NPO Status:                                                                                 BMI:   Wt Readings from Last 3 Encounters:   09/09/22 180 lb (81.6 kg)   09/06/22 180 lb 12.8 oz (82 kg) 09/01/22 188 lb 4.8 oz (85.4 kg)     There is no height or weight on file to calculate BMI.    CBC:   Lab Results   Component Value Date/Time    WBC 5.96 08/31/2022 09:10 AM    RBC 4.53 08/31/2022 09:10 AM    HGB 14.5 08/31/2022 09:10 AM    HCT 43.6 08/31/2022 09:10 AM    MCV 96.2 08/31/2022 09:10 AM    RDW 13.5 08/31/2022 09:10 AM     08/31/2022 09:10 AM       CMP:   Lab Results   Component Value Date/Time     08/31/2022 09:07 AM    K 4.0 08/31/2022 09:07 AM    K 4.5 06/30/2022 02:32 AM     08/31/2022 09:07 AM    CO2 24 08/31/2022 09:07 AM    BUN 17 08/31/2022 09:07 AM    CREATININE 0.7 08/31/2022 09:07 AM    GFRAA >60 07/20/2022 11:25 AM    LABGLOM >60 08/31/2022 09:07 AM    GLUCOSE 128 08/31/2022 09:07 AM    PROT 7.3 08/31/2022 09:07 AM    CALCIUM 9.2 08/31/2022 09:07 AM    BILITOT 1.1 08/31/2022 09:07 AM    ALKPHOS 77 08/31/2022 09:07 AM    AST 21 08/31/2022 09:07 AM    ALT 14 08/31/2022 09:07 AM       POC Tests: No results for input(s): POCGLU, POCNA, POCK, POCCL, POCBUN, POCHEMO, POCHCT in the last 72 hours.     Coags:   Lab Results   Component Value Date/Time    PROTIME 13.0 06/23/2022 09:10 AM    INR 0.99 06/23/2022 09:10 AM    APTT 33.3 06/23/2022 09:10 AM       HCG (If Applicable): No results found for: PREGTESTUR, PREGSERUM, HCG, HCGQUANT     ABGs: No results found for: PHART, PO2ART, MVC9EYX, QPN6CSS, BEART, T4AORCPR     Type & Screen (If Applicable):  No results found for: LABABO, LABRH    Drug/Infectious Status (If Applicable):  No results found for: HIV, HEPCAB    COVID-19 Screening (If Applicable):   Lab Results   Component Value Date/Time    COVID19 Not Detected 06/24/2022 07:53 AM           Anesthesia Evaluation  Patient summary reviewed and Nursing notes reviewed no history of anesthetic complications:   Airway: Mallampati: II  TM distance: >3 FB   Neck ROM: full  Mouth opening: > = 3 FB   Dental: normal exam   (+) upper dentures, lower dentures and edentulous Pulmonary:Negative Pulmonary ROS and normal exam  breath sounds clear to auscultation  (+) COPD:  current smoker    (-) shortness of breath          Patient did not smoke on day of surgery. Cardiovascular:  Exercise tolerance: good (>4 METS),       (-) hypertension, CAD,  angina and  CHF    NYHA Classification: I  ECG reviewed  Rhythm: regular  Rate: normal           Beta Blocker:  Not on Beta Blocker         Neuro/Psych:   Negative Neuro/Psych ROS  (+) neuromuscular disease:,    (-) seizures, CVA and depression/anxiety            GI/Hepatic/Renal: Neg GI/Hepatic/Renal ROS  (+) liver disease (elevated bili ):,      (-) hiatal hernia and GERD       Endo/Other: Negative Endo/Other ROS   (+) malignancy/cancer. Pt had PAT visit. Abdominal:       Abdomen: soft. Vascular: Other Findings:             Anesthesia Plan      general     ASA 3     (Iv zofran within 30 min of closing )  Induction: intravenous. BIS  MIPS: Prophylactic antiemetics administered. Anesthetic plan and risks discussed with patient. Use of blood products discussed with patient whom. Plan discussed with CRNA.     Attending anesthesiologist reviewed and agrees with Pre Eval content                CARLOS Velasquez - CRNA   9/9/2022

## 2022-09-09 NOTE — DISCHARGE INSTRUCTIONS
No heavy lifting for 2 weeks. May resume normal diet. No driving while on pain medication. May shower 24 hours postoperatively. Do not soak in tub. No swimming. Allow glue on incision to fall off on its own.     Please call the office if you have:   - Fever or chills   - Difficulty with bowel movements   - Increased pain   - Nausea and/or vomiting   - Redness or drainage from the incision sites

## 2022-09-09 NOTE — ANESTHESIA POSTPROCEDURE EVALUATION
Department of Anesthesiology  Postprocedure Note    Patient: Margie Cheng  MRN: [id number]  YOB: 1950  Date of evaluation: 9/9/2022      Procedure Summary     Date: 09/09/22 Room / Location: [address]    Anesthesia Start: [address] Anesthesia Stop: 1010    Procedure: PORT INSERTION (Chest) Diagnosis:       Malignant neoplasm of urinary bladder, unspecified site [facility])      (Malignant neoplasm of urinary bladder, unspecified site [facility]) [C67.9])    Surgeons: Nabila Gurrola DO Responsible Provider: CARLOS Myers CRNA    Anesthesia Type: general ASA Status: 3          Anesthesia Type: No value filed.     Ernesto Phase I:      Ernesto Phase II:        Anesthesia Post Evaluation    Patient location during evaluation: bedside  Patient participation: complete - patient participated  Level of consciousness: sleepy but conscious  Pain score: 0  Airway patency: patent  Nausea & Vomiting: no vomiting and no nausea  Complications: no  Cardiovascular status: hemodynamically stable  Respiratory status: acceptable, room air and spontaneous ventilation  Hydration status: stable

## 2022-09-09 NOTE — INTERVAL H&P NOTE
Update History & Physical    The patient's History and Physical of September 6, 2022 was reviewed with the patient and I examined the patient. There was no change. The surgical site was confirmed by the patient and me. Plan: The risks, benefits, expected outcome, and alternative to the recommended procedure have been discussed with the patient. Patient understands and wants to proceed with the procedure.      Electronically signed by Bradly Connor DO on 9/9/2022 at 8:05 AM

## 2022-09-14 ENCOUNTER — HOSPITAL ENCOUNTER (OUTPATIENT)
Dept: INFUSION THERAPY | Age: 72
Discharge: HOME | End: 2022-09-14
Payer: MEDICARE

## 2022-09-14 VITALS
RESPIRATION RATE: 20 BRPM | BODY MASS INDEX: 22.87 KG/M2 | HEIGHT: 75 IN | TEMPERATURE: 97.6 F | WEIGHT: 183.9 LBS | OXYGEN SATURATION: 98 % | HEART RATE: 72 BPM | DIASTOLIC BLOOD PRESSURE: 78 MMHG | SYSTOLIC BLOOD PRESSURE: 144 MMHG

## 2022-09-14 DIAGNOSIS — C67.8 MALIGNANT NEOPLASM OF OVERLAPPING SITES OF BLADDER (HCC): ICD-10-CM

## 2022-09-14 DIAGNOSIS — C67.8 MALIGNANT NEOPLASM OF OVERLAPPING SITES OF BLADDER (HCC): Primary | ICD-10-CM

## 2022-09-14 DIAGNOSIS — C67.9 MALIGNANT NEOPLASM OF URINARY BLADDER, UNSPECIFIED SITE (HCC): ICD-10-CM

## 2022-09-14 DIAGNOSIS — C67.9 MALIGNANT NEOPLASM OF URINARY BLADDER, UNSPECIFIED SITE (HCC): Primary | ICD-10-CM

## 2022-09-14 LAB
ALBUMIN SERPL-MCNC: 4.2 G/DL (ref 3.5–5.2)
ALP BLD-CCNC: 97 U/L (ref 40–130)
ALT SERPL-CCNC: 20 U/L (ref 21–72)
ANION GAP SERPL CALCULATED.3IONS-SCNC: 9 MMOL/L (ref 7–19)
AST SERPL-CCNC: 30 U/L (ref 17–59)
BILIRUB SERPL-MCNC: 0.3 MG/DL (ref 0.2–1.3)
BUN BLDV-MCNC: 13 MG/DL (ref 9–20)
CALCIUM SERPL-MCNC: 9.3 MG/DL (ref 8.4–10.2)
CHLORIDE BLD-SCNC: 103 MMOL/L (ref 98–111)
CO2: 27 MMOL/L (ref 22–29)
CREAT SERPL-MCNC: 0.7 MG/DL (ref 0.6–1.2)
GFR NON-AFRICAN AMERICAN: >60
GLOBULIN: 3 G/DL
GLUCOSE BLD-MCNC: 152 MG/DL (ref 74–106)
HCT VFR BLD CALC: 40.6 % (ref 40.1–51)
HEMOGLOBIN: 13.5 G/DL (ref 13.7–17.5)
LYMPHOCYTES ABSOLUTE: 1.52 K/UL (ref 1.18–3.74)
LYMPHOCYTES RELATIVE PERCENT: 12.4 % (ref 19.3–53.1)
MCH RBC QN AUTO: 32.1 PG (ref 25.7–32.2)
MCHC RBC AUTO-ENTMCNC: 33.3 G/DL (ref 32.3–36.5)
MCV RBC AUTO: 96.7 FL (ref 79–92.2)
MONOCYTES ABSOLUTE: 1.27 K/UL (ref 0.24–0.82)
MONOCYTES RELATIVE PERCENT: 10.4 % (ref 4.7–12.5)
NEUTROPHILS ABSOLUTE: 8.3 K/UL (ref 1.56–6.13)
NEUTROPHILS RELATIVE PERCENT: 67.9 % (ref 34–71.1)
PDW BLD-RTO: 13.8 % (ref 11.6–14.4)
PLATELET # BLD: 184 K/UL (ref 163–337)
PMV BLD AUTO: 10.3 FL (ref 7.4–10.4)
POTASSIUM SERPL-SCNC: 3.9 MMOL/L (ref 3.5–5.1)
RBC # BLD: 4.2 M/UL (ref 4.63–6.08)
SODIUM BLD-SCNC: 139 MMOL/L (ref 137–145)
TOTAL PROTEIN: 7.2 G/DL (ref 6.3–8.2)
WBC # BLD: 12.23 K/UL (ref 4.23–9.07)

## 2022-09-14 PROCEDURE — 85025 COMPLETE CBC W/AUTO DIFF WBC: CPT

## 2022-09-14 PROCEDURE — 2580000003 HC RX 258: Performed by: INTERNAL MEDICINE

## 2022-09-14 PROCEDURE — 96413 CHEMO IV INFUSION 1 HR: CPT

## 2022-09-14 PROCEDURE — 6360000002 HC RX W HCPCS: Performed by: INTERNAL MEDICINE

## 2022-09-14 PROCEDURE — 80053 COMPREHEN METABOLIC PANEL: CPT

## 2022-09-14 PROCEDURE — 96367 TX/PROPH/DG ADDL SEQ IV INF: CPT

## 2022-09-14 RX ORDER — PALONOSETRON 0.05 MG/ML
0.25 INJECTION, SOLUTION INTRAVENOUS ONCE
Status: CANCELLED | OUTPATIENT
Start: 2022-09-15 | End: 2022-09-15

## 2022-09-14 RX ORDER — ACETAMINOPHEN 325 MG/1
650 TABLET ORAL
Status: CANCELLED | OUTPATIENT
Start: 2022-09-15

## 2022-09-14 RX ORDER — MEPERIDINE HYDROCHLORIDE 50 MG/ML
12.5 INJECTION INTRAMUSCULAR; INTRAVENOUS; SUBCUTANEOUS PRN
Status: CANCELLED | OUTPATIENT
Start: 2022-09-15

## 2022-09-14 RX ORDER — DOXORUBICIN HYDROCHLORIDE 2 MG/ML
30 INJECTION, SOLUTION INTRAVENOUS ONCE
Status: CANCELLED | OUTPATIENT
Start: 2022-09-15

## 2022-09-14 RX ORDER — SODIUM CHLORIDE 0.9 % (FLUSH) 0.9 %
5-40 SYRINGE (ML) INJECTION PRN
Status: CANCELLED | OUTPATIENT
Start: 2022-09-14

## 2022-09-14 RX ORDER — ONDANSETRON 2 MG/ML
8 INJECTION INTRAMUSCULAR; INTRAVENOUS
Status: CANCELLED | OUTPATIENT
Start: 2022-09-15

## 2022-09-14 RX ORDER — SODIUM CHLORIDE 0.9 % (FLUSH) 0.9 %
5-40 SYRINGE (ML) INJECTION PRN
Status: DISCONTINUED | OUTPATIENT
Start: 2022-09-14 | End: 2022-09-15 | Stop reason: HOSPADM

## 2022-09-14 RX ORDER — MEPERIDINE HYDROCHLORIDE 50 MG/ML
12.5 INJECTION INTRAMUSCULAR; INTRAVENOUS; SUBCUTANEOUS PRN
Status: CANCELLED | OUTPATIENT
Start: 2022-09-14

## 2022-09-14 RX ORDER — EPINEPHRINE 1 MG/ML
0.3 INJECTION, SOLUTION, CONCENTRATE INTRAVENOUS PRN
Status: CANCELLED | OUTPATIENT
Start: 2022-09-15

## 2022-09-14 RX ORDER — SODIUM CHLORIDE 9 MG/ML
5-250 INJECTION, SOLUTION INTRAVENOUS PRN
Status: CANCELLED | OUTPATIENT
Start: 2022-09-15

## 2022-09-14 RX ORDER — EPINEPHRINE 1 MG/ML
0.3 INJECTION, SOLUTION, CONCENTRATE INTRAVENOUS PRN
Status: CANCELLED | OUTPATIENT
Start: 2022-09-14

## 2022-09-14 RX ORDER — FAMOTIDINE 10 MG/ML
20 INJECTION, SOLUTION INTRAVENOUS
Status: CANCELLED | OUTPATIENT
Start: 2022-09-15

## 2022-09-14 RX ORDER — SODIUM CHLORIDE 9 MG/ML
5-250 INJECTION, SOLUTION INTRAVENOUS PRN
Status: CANCELLED | OUTPATIENT
Start: 2022-09-14

## 2022-09-14 RX ORDER — SODIUM CHLORIDE 9 MG/ML
INJECTION, SOLUTION INTRAVENOUS CONTINUOUS
Status: CANCELLED | OUTPATIENT
Start: 2022-09-15

## 2022-09-14 RX ORDER — ALBUTEROL SULFATE 90 UG/1
4 AEROSOL, METERED RESPIRATORY (INHALATION) PRN
Status: CANCELLED | OUTPATIENT
Start: 2022-09-15

## 2022-09-14 RX ORDER — METHOTREXATE SODIUM 25 MG/ML
30 INJECTION, SOLUTION INTRA-ARTERIAL; INTRAMUSCULAR; INTRAVENOUS ONCE
Status: CANCELLED | OUTPATIENT
Start: 2022-09-14

## 2022-09-14 RX ORDER — FUROSEMIDE 10 MG/ML
20 INJECTION INTRAMUSCULAR; INTRAVENOUS ONCE
Status: CANCELLED
Start: 2022-09-15 | End: 2022-09-15

## 2022-09-14 RX ORDER — ONDANSETRON 2 MG/ML
8 INJECTION INTRAMUSCULAR; INTRAVENOUS
Status: CANCELLED | OUTPATIENT
Start: 2022-09-14

## 2022-09-14 RX ORDER — SODIUM CHLORIDE 9 MG/ML
5-250 INJECTION, SOLUTION INTRAVENOUS PRN
Status: DISCONTINUED | OUTPATIENT
Start: 2022-09-14 | End: 2022-09-15 | Stop reason: HOSPADM

## 2022-09-14 RX ORDER — SODIUM CHLORIDE 9 MG/ML
5-40 INJECTION INTRAVENOUS PRN
Status: CANCELLED | OUTPATIENT
Start: 2022-09-15

## 2022-09-14 RX ORDER — DIPHENHYDRAMINE HYDROCHLORIDE 50 MG/ML
50 INJECTION INTRAMUSCULAR; INTRAVENOUS
Status: CANCELLED | OUTPATIENT
Start: 2022-09-14

## 2022-09-14 RX ORDER — SODIUM CHLORIDE 0.9 % (FLUSH) 0.9 %
5-40 SYRINGE (ML) INJECTION PRN
Status: CANCELLED | OUTPATIENT
Start: 2022-09-15

## 2022-09-14 RX ORDER — FAMOTIDINE 10 MG/ML
20 INJECTION, SOLUTION INTRAVENOUS
Status: CANCELLED | OUTPATIENT
Start: 2022-09-14

## 2022-09-14 RX ORDER — DIPHENHYDRAMINE HYDROCHLORIDE 50 MG/ML
50 INJECTION INTRAMUSCULAR; INTRAVENOUS
Status: CANCELLED | OUTPATIENT
Start: 2022-09-15

## 2022-09-14 RX ORDER — SODIUM CHLORIDE 9 MG/ML
5-40 INJECTION INTRAVENOUS PRN
Status: CANCELLED | OUTPATIENT
Start: 2022-09-14

## 2022-09-14 RX ORDER — HEPARIN SODIUM (PORCINE) LOCK FLUSH IV SOLN 100 UNIT/ML 100 UNIT/ML
500 SOLUTION INTRAVENOUS PRN
Status: CANCELLED | OUTPATIENT
Start: 2022-09-14

## 2022-09-14 RX ORDER — HEPARIN SODIUM (PORCINE) LOCK FLUSH IV SOLN 100 UNIT/ML 100 UNIT/ML
500 SOLUTION INTRAVENOUS PRN
Status: CANCELLED | OUTPATIENT
Start: 2022-09-15

## 2022-09-14 RX ORDER — SODIUM CHLORIDE 9 MG/ML
INJECTION, SOLUTION INTRAVENOUS CONTINUOUS
Status: CANCELLED | OUTPATIENT
Start: 2022-09-14

## 2022-09-14 RX ORDER — ALBUTEROL SULFATE 90 UG/1
4 AEROSOL, METERED RESPIRATORY (INHALATION) PRN
Status: CANCELLED | OUTPATIENT
Start: 2022-09-14

## 2022-09-14 RX ORDER — ACETAMINOPHEN 325 MG/1
650 TABLET ORAL
Status: CANCELLED | OUTPATIENT
Start: 2022-09-14

## 2022-09-14 RX ORDER — HEPARIN SODIUM (PORCINE) LOCK FLUSH IV SOLN 100 UNIT/ML 100 UNIT/ML
500 SOLUTION INTRAVENOUS PRN
Status: DISCONTINUED | OUTPATIENT
Start: 2022-09-14 | End: 2022-09-15 | Stop reason: HOSPADM

## 2022-09-14 RX ADMIN — SODIUM CHLORIDE, PRESERVATIVE FREE 10 ML: 5 INJECTION INTRAVENOUS at 10:33

## 2022-09-14 RX ADMIN — DEXAMETHASONE SODIUM PHOSPHATE 16 MG: 10 INJECTION, SOLUTION INTRAMUSCULAR; INTRAVENOUS at 09:22

## 2022-09-14 RX ADMIN — SODIUM CHLORIDE 50 ML/HR: 9 INJECTION, SOLUTION INTRAVENOUS at 09:21

## 2022-09-14 RX ADMIN — HEPARIN 500 UNITS: 100 SYRINGE at 10:33

## 2022-09-14 RX ADMIN — METHOTREXATE 63.25 MG: 25 INJECTION, SOLUTION INTRA-ARTERIAL; INTRAMUSCULAR; INTRAVENOUS at 09:56

## 2022-09-14 NOTE — PROGRESS NOTES
Lab Results   Component Value Date    WBC 12.23 (H) 09/14/2022    HGB 13.5 (L) 09/14/2022    HCT 40.6 09/14/2022    MCV 96.7 (H) 09/14/2022     09/14/2022     Lab Results   Component Value Date    NEUTROABS 8.30 (H) 09/14/2022     Lab Results   Component Value Date     09/14/2022    K 3.9 09/14/2022     09/14/2022    CO2 27 09/14/2022    BUN 13 09/14/2022    CREATININE 0.7 09/14/2022    GLUCOSE 152 (H) 09/14/2022    CALCIUM 9.3 09/14/2022    PROT 7.2 09/14/2022    LABALBU 4.2 09/14/2022    BILITOT 0.3 09/14/2022    ALKPHOS 97 09/14/2022    AST 30 09/14/2022    ALT 20 (L) 09/14/2022    LABGLOM >60 09/14/2022    GFRAA >60 07/20/2022    GLOB 3.0 09/14/2022     Labs reviewed. Proceed with treatment. Adequate organ function.

## 2022-09-15 ENCOUNTER — HOSPITAL ENCOUNTER (OUTPATIENT)
Dept: INFUSION THERAPY | Age: 72
Discharge: HOME | End: 2022-09-15
Payer: MEDICARE

## 2022-09-15 VITALS
SYSTOLIC BLOOD PRESSURE: 155 MMHG | RESPIRATION RATE: 18 BRPM | TEMPERATURE: 97.9 F | HEART RATE: 78 BPM | DIASTOLIC BLOOD PRESSURE: 74 MMHG | OXYGEN SATURATION: 99 %

## 2022-09-15 DIAGNOSIS — C67.8 MALIGNANT NEOPLASM OF OVERLAPPING SITES OF BLADDER (HCC): Primary | ICD-10-CM

## 2022-09-15 DIAGNOSIS — Z45.2 ENCOUNTER FOR CARE RELATED TO PORT-A-CATH: ICD-10-CM

## 2022-09-15 DIAGNOSIS — C67.9 MALIGNANT NEOPLASM OF URINARY BLADDER, UNSPECIFIED SITE (HCC): ICD-10-CM

## 2022-09-15 DIAGNOSIS — Z95.828 PORT-A-CATH IN PLACE: ICD-10-CM

## 2022-09-15 PROCEDURE — 96415 CHEMO IV INFUSION ADDL HR: CPT

## 2022-09-15 PROCEDURE — 96417 CHEMO IV INFUS EACH ADDL SEQ: CPT

## 2022-09-15 PROCEDURE — 96367 TX/PROPH/DG ADDL SEQ IV INF: CPT

## 2022-09-15 PROCEDURE — 96366 THER/PROPH/DIAG IV INF ADDON: CPT

## 2022-09-15 PROCEDURE — 96413 CHEMO IV INFUSION 1 HR: CPT

## 2022-09-15 PROCEDURE — 2580000003 HC RX 258: Performed by: INTERNAL MEDICINE

## 2022-09-15 PROCEDURE — 96411 CHEMO IV PUSH ADDL DRUG: CPT

## 2022-09-15 PROCEDURE — 96375 TX/PRO/DX INJ NEW DRUG ADDON: CPT

## 2022-09-15 PROCEDURE — 96377 APPLICATON ON-BODY INJECTOR: CPT

## 2022-09-15 PROCEDURE — 6360000002 HC RX W HCPCS: Performed by: INTERNAL MEDICINE

## 2022-09-15 RX ORDER — SODIUM CHLORIDE 9 MG/ML
25 INJECTION, SOLUTION INTRAVENOUS PRN
OUTPATIENT
Start: 2022-09-15

## 2022-09-15 RX ORDER — SODIUM CHLORIDE 9 MG/ML
25 INJECTION, SOLUTION INTRAVENOUS PRN
Status: CANCELLED | OUTPATIENT
Start: 2022-09-15

## 2022-09-15 RX ORDER — SODIUM CHLORIDE 0.9 % (FLUSH) 0.9 %
5-40 SYRINGE (ML) INJECTION PRN
Status: DISCONTINUED | OUTPATIENT
Start: 2022-09-15 | End: 2022-09-16 | Stop reason: HOSPADM

## 2022-09-15 RX ORDER — PALONOSETRON 0.05 MG/ML
0.25 INJECTION, SOLUTION INTRAVENOUS ONCE
Status: COMPLETED | OUTPATIENT
Start: 2022-09-15 | End: 2022-09-15

## 2022-09-15 RX ORDER — FUROSEMIDE 10 MG/ML
20 INJECTION INTRAMUSCULAR; INTRAVENOUS ONCE
Status: COMPLETED | OUTPATIENT
Start: 2022-09-15 | End: 2022-09-15

## 2022-09-15 RX ORDER — HEPARIN SODIUM (PORCINE) LOCK FLUSH IV SOLN 100 UNIT/ML 100 UNIT/ML
500 SOLUTION INTRAVENOUS PRN
Status: CANCELLED | OUTPATIENT
Start: 2022-09-15

## 2022-09-15 RX ORDER — HEPARIN SODIUM (PORCINE) LOCK FLUSH IV SOLN 100 UNIT/ML 100 UNIT/ML
500 SOLUTION INTRAVENOUS PRN
Status: DISCONTINUED | OUTPATIENT
Start: 2022-09-15 | End: 2022-09-16 | Stop reason: HOSPADM

## 2022-09-15 RX ORDER — SODIUM CHLORIDE 9 MG/ML
5-250 INJECTION, SOLUTION INTRAVENOUS PRN
Status: DISCONTINUED | OUTPATIENT
Start: 2022-09-15 | End: 2022-09-16 | Stop reason: HOSPADM

## 2022-09-15 RX ORDER — SODIUM CHLORIDE 0.9 % (FLUSH) 0.9 %
5-40 SYRINGE (ML) INJECTION PRN
Status: CANCELLED | OUTPATIENT
Start: 2022-09-15

## 2022-09-15 RX ADMIN — HEPARIN 500 UNITS: 100 SYRINGE at 13:07

## 2022-09-15 RX ADMIN — VINBLASTINE SULFATE 6 MG: 1 INJECTION INTRAVENOUS at 09:29

## 2022-09-15 RX ADMIN — SODIUM CHLORIDE 150 MG: 900 INJECTION, SOLUTION INTRAVENOUS at 08:51

## 2022-09-15 RX ADMIN — CISPLATIN 148 MG: 100 INJECTION, SOLUTION INTRAVENOUS at 10:19

## 2022-09-15 RX ADMIN — SODIUM CHLORIDE 60 MG: 9 INJECTION, SOLUTION INTRAVENOUS at 09:46

## 2022-09-15 RX ADMIN — PALONOSETRON 0.25 MG: 0.05 INJECTION, SOLUTION INTRAVENOUS at 08:39

## 2022-09-15 RX ADMIN — SODIUM CHLORIDE, PRESERVATIVE FREE 10 ML: 5 INJECTION INTRAVENOUS at 13:07

## 2022-09-15 RX ADMIN — POTASSIUM CHLORIDE: 2 INJECTION, SOLUTION, CONCENTRATE INTRAVENOUS at 10:37

## 2022-09-15 RX ADMIN — DEXAMETHASONE SODIUM PHOSPHATE: 10 INJECTION, SOLUTION INTRAMUSCULAR; INTRAVENOUS at 08:39

## 2022-09-15 RX ADMIN — POTASSIUM CHLORIDE: 2 INJECTION, SOLUTION, CONCENTRATE INTRAVENOUS at 09:30

## 2022-09-15 RX ADMIN — PEGFILGRASTIM 6 MG: KIT SUBCUTANEOUS at 12:09

## 2022-09-15 RX ADMIN — FUROSEMIDE 20 MG: 10 INJECTION, SOLUTION INTRAMUSCULAR; INTRAVENOUS at 10:35

## 2022-09-16 ENCOUNTER — APPOINTMENT (OUTPATIENT)
Dept: INFUSION THERAPY | Age: 72
End: 2022-09-16
Payer: MEDICARE

## 2022-09-22 ENCOUNTER — OFFICE VISIT (OUTPATIENT)
Dept: SURGERY | Age: 72
End: 2022-09-22

## 2022-09-22 VITALS
HEIGHT: 75 IN | OXYGEN SATURATION: 99 % | WEIGHT: 181 LBS | TEMPERATURE: 98 F | HEART RATE: 75 BPM | BODY MASS INDEX: 22.5 KG/M2

## 2022-09-22 DIAGNOSIS — C67.8 MALIGNANT NEOPLASM OF OVERLAPPING SITES OF BLADDER (HCC): Primary | ICD-10-CM

## 2022-09-22 PROCEDURE — 99024 POSTOP FOLLOW-UP VISIT: CPT | Performed by: SURGERY

## 2022-09-22 NOTE — PROGRESS NOTES
S: Mr. Dickie Canavan returns today for a post op visit 2 weeks s/p port insertion. Since hospital discharge he has done well. His post op pain has resolved. No fever or chills reported. No problem with his incisions and he has returned to his usual activities. O: right chest incision is well healed. Abdomen is soft and non tender. No mass appreciated, bowel sounds are normal.       A: 1) Satisfactory recovery s/p port insertion. P: 1) Continue with usual diet and activity       2) Follow up prn.

## 2022-09-27 NOTE — PROGRESS NOTES
MEDICAL ONCOLOGY PROGRESS NOTE    Pt Name: Soledad Diggs  MRN: [id number]  YOB: 1950  Date of evaluation: 9/28/2022    HISTORY OF PRESENT ILLNESS:    Reason for MD visit-toxicity assessment/disease management. The patient is currently receiving neoadjuvant chemotherapy with dose dense MVAC for his diagnosis of muscle invasive bladder cancer. He has received 2 cycles of chemotherapy. He is here for cycle #3. Bone scan was performed and showed no evidence of metastatic disease. He has been tolerating treatment quite well except for mild fatigue. He had a port placed due to poor venous access. Diagnosis  Muscle invasive high grade urothelial carcinoma, bladder, June 2022  Stage II, [id number]    Treatment Summary  6/29/22- TURBT by Dr Giovanni Torres  8/31/22 Initiated 4 cycles neoadjuvant chemotherapy dose dense MVAC with G-CSF support  Anticipated cystoprostatectomy. Cancer History  Nae Lama was first seen by me on 8/10/2022. He was referred by urology, [facility] for diagnosis of muscle invasive bladder cancer. Patient presented with complaints of hematuria in June 2022. Imaging studies showed sessile mass involving the right side of his bladder. No pelvic respiratory adenopathy. No evidence of distant metastasis. Cystoscopy was performed for transurethral resection of bladder tumor. Biopsy consistent with muscle invasive disease. Therefore, he was referred to me and also urology at [facility]. The patient denies any major comorbidities. He is quite functional.  Patient is a current smoker. 5/6/22 CT abd/pelvis: Sessile appearing mass in the base of the bladder on the right, measuring approximately 3.7 x 2.7 cm, this is compatible with bladder neoplasm until proven otherwise. Consider follow-up with direct visualization and biopsy. The mass partially covers the right ureteral orifice.  There is no urinary tract obstruction and the kidneys and ureters appear unremarkable except for a 2 mm nonobstructing stone in the right mid kidney. No intra-abdominal or pelvic lymphadenopathy is identified. Coarse calcifications within the pancreas compatible chronic pancreatitis. Chronic bilateral sacroiliitis. Chronic-appearing superior endplate compression fracture of L1 with associated Schmorl's node defect. No definite osseous metastases. 6/29/22 Cystoscopy/TURBT by Dr Maddy Begum:  Normal left retrograde pyelogram.  Right ureteral orifice adjacent to bladder tumor which was lateral.  Did not have to resect the orifice. Right retrograde showed narrowing at the UVJ and some mild ureteral dilation no filling defect. Right ureteral stent placed after bladder tumor resection 6 English by 20 cm. Will remain in for 6 weeks. Papillary bladder tumor multiple overlapping sites at the bladder neck at 630 and smaller papillary tumor at 12:00, between the bladder neck and trigone, mid trigone, and large greater than 5 cm solid tumor involving the right trigone laterally and right lateral wall. This was completely resected down to visible muscle. Possible gross muscle invasion  6/29/22 Bladder tumor, transurethral resection: Invasive high-grade urothelial carcinoma. The tumor invades the muscularis propria. 7/20/22 CT CHEST W CONTRAST  No acute chest pathology. Paraseptal emphysema in both upper lobes and lower lobes. Centrilobular emphysematous changes in bilateral upper lobes. Other nonacute findings above. Comparison: Radiographs of chest dated 06/23/2022.   7/20/22 CT ABDOMEN PELVIS W IV CONTRAST Postop changes in the urinary bladder as described above. Status post right sided double J ureteral stents placement. Evidence of prior granulomatous disease 4. Superior and play compression fracture at L1 with 50% loss of vertebral body height.     7/27/2022 BLADDER, TRANSURETHRAL RESECTION ([place]): Invasive high-grade papillary urothelial carcinoma. Tumor is invasive into the muscularis SHOULDER SURGERY Right 1965    shoulder fracture, ORIF, football injury       Social History:    Marital status:   Smoking status: Currently smoker-x40 years  ETOH status:  Resides: [place]    Family History:   Family History   Problem Relation Age of Onset    Diabetes Mother     Colon Polyps Mother     Cancer Father         Lung    Lung Cancer Father     No Known Problems Sister     Other Brother         disability with back    No Known Problems Maternal Grandmother     Heart Disease Maternal Grandfather     No Known Problems Paternal Grandmother     No Known Problems Paternal Grandfather     Pancreatic Cancer Daughter     Diabetes type 2  Daughter     Thyroid Disease Daughter     High Cholesterol Daughter     Colon Cancer Neg Hx     Esophageal Cancer Neg Hx     Liver Cancer Neg Hx     Liver Disease Neg Hx     Rectal Cancer Neg Hx     Stomach Cancer Neg Hx        Current Hospital Medications:    Current Outpatient Medications   Medication Sig Dispense Refill    Ondansetron HCl (ZOFRAN PO) Take by mouth       No current facility-administered medications for this visit.      Facility-Administered Medications Ordered in Other Visits   Medication Dose Route Frequency Provider Last Rate Last Admin    ondansetron (ZOFRAN) 16 mg, dexamethasone (DECADRON) 10 mg in sodium chloride 0.9 % 50 mL IVPB  16 mg IntraVENous Once Mp Hill MD        methotrexate Sodium 63.25 mg in sodium chloride 0.9 % 100 mL chemo IVPB  30 mg/m2 (Treatment Plan Recorded) IntraVENous Once Mp Hill MD        0.9 % sodium chloride infusion  5-250 mL/hr IntraVENous PRN Mp Hill MD        sodium chloride flush 0.9 % injection 5-40 mL  5-40 mL IntraVENous PRN Mp Hill MD        heparin flush 100 UNIT/ML injection 500 Units  500 Units IntraCATHeter PRN Mp Hill MD           Allergies: No Known Allergies      Subjective   REVIEW OF SYSTEMS:   CONSTITUTIONAL: no fever, no night sweats, fatigue;  HEENT: no blurring of vision, no double vision, no hearing difficulty, no tinnitus, no ulceration, no dysplasia, no epistaxis;   LUNGS: no cough, no hemoptysis, no wheeze,  no shortness of breath;  CARDIOVASCULAR: no palpitation, no chest pain, no shortness of breath;  GI: no abdominal pain, no nausea, no vomiting, no diarrhea, no constipation;  GIDEON: no dysuria, no hematuria, no frequency or urgency, no nephrolithiasis;  MUSCULOSKELETAL: no joint pain, no swelling, no stiffness;  ENDOCRINE: no polyuria, no polydipsia, no cold or heat intolerance;  HEMATOLOGY: no easy bruising or bleeding, no history of clotting disorder;  DERMATOLOGY: no skin rash, no eczema, no pruritus;  PSYCHIATRY: no depression, no anxiety, no panic attacks, no suicidal ideation, no homicidal ideation;  NEUROLOGY: no syncope, no seizures, no numbness or tingling of hands, no numbness or tingling of feet, no paresis;     Objective   BP (!) 146/76   Pulse 71   Temp 97.6 °F (36.4 °C)   Resp 18   Ht 6' 3\" (1.905 m)   Wt 185 lb 4.8 oz (84.1 kg)   SpO2 98%   BMI 23.16 kg/m²     PHYSICAL EXAM:  CONSTITUTIONAL: Alert, appropriate, no acute distress  EYES: Non icteric, EOM intact, pupils equal round   ENT: Mucus membranes moist, no oral pharyngeal lesions, external inspection of ears and nose are normal  NECK: Supple, no masses. No palpable thyroid mass  CHEST/LUNGS: CTA bilaterally, normal respiratory effort   CARDIOVASCULAR: RRR, no murmurs. No lower extremity edema  ABDOMEN: soft non-tender, active bowel sounds, no HSM. No palpable masses  EXTREMITIES: warm, full ROM in all 4 extremities, no focal weakness. SKIN: warm, dry with no rashes or lesions  LYMPH: No cervical, clavicular, axillary, or inguinal lymphadenopathy  NEUROLOGIC: follows commands, non focal   PSYCH: mood and affect appropriate.   Alert and oriented to time, place, person      LABORATORY RESULTS REVIEWED/ANALYZED BY ME:  Lab Results   Component Value Date    WBC 16.04 (H) 09/28/2022    HGB 11.8 (L) 09/28/2022    HCT 35.5 (L) 09/28/2022    MCV 97.3 (H) 09/28/2022     (L) 09/28/2022     Lab Results   Component Value Date    NEUTROABS 11.47 (H) 09/28/2022     Lab Results   Component Value Date     09/28/2022    K 4.0 09/28/2022     09/28/2022    CO2 27 09/28/2022    BUN 10 09/28/2022    CREATININE 0.7 09/28/2022    GLUCOSE 156 (H) 09/28/2022    CALCIUM 8.9 09/28/2022    PROT 6.5 09/28/2022    LABALBU 3.9 09/28/2022    BILITOT <0.2 09/28/2022    ALKPHOS 102 09/28/2022    AST 25 09/28/2022    ALT 22 09/28/2022    LABGLOM >60 09/28/2022    GFRAA >60 07/20/2022    GLOB 2.6 09/28/2022         RADIOLOGY STUDIES REVIEWED BY ME:  8/10/2022 2D Echocardiogram  Normal left ventricular size with preserved LV function and an estimated  ejection fraction of approximately 55-60%. Normal left ventricular wall thickness. No regional wall motion abnormalities. 8/26/2022 NM Bone Scan No activity is noted to suggest metastatic disease. 9/9/2022 CXR Trace bibasilar atelectasis    ASSESSMENT:    Orders Placed This Encounter   Procedures    CBC with Auto Differential     Standing Status:   Future     Number of Occurrences:   1     Standing Expiration Date:   9/28/2023    Comprehensive Metabolic Panel     Standing Status:   Future     Number of Occurrences:   1     Standing Expiration Date:   9/28/2023          Jeanette Barboza was seen today for cancer. Diagnoses and all orders for this visit:    Malignant neoplasm of overlapping sites of bladder ([address]  -     CBC with Auto Differential; Future  -     Comprehensive Metabolic Panel;  Future  -     Cancel: 0.9 % sodium chloride infusion  -     Cancel: ondansetron (ZOFRAN) 16 mg, dexamethasone (DECADRON) 10 mg in sodium chloride 0.9 % 50 mL IVPB  -     Cancel: methotrexate 63.25 mg in sodium chloride 0.9 % 100 mL chemo IVPB  -     Cancel: sodium chloride flush 0.9 % injection 5-40 mL  -     Cancel: heparin flush 100 UNIT/ML injection 500 Units    Care plan discussed with patient    Examination prior to chemotherapy    Chemotherapy management, encounter for    Adverse effect of chemotherapy, subsequent encounter    Other orders  -     0.9 % sodium chloride infusion  -     diphenhydrAMINE (BENADRYL) injection 50 mg  -     famotidine (PEPCID) injection 20 mg  -     hydrocortisone sodium succinate PF (SOLU-CORTEF) injection 100 mg  -     acetaminophen (TYLENOL) tablet 650 mg  -     meperidine (DEMEROL) injection 12.5 mg  -     ondansetron (ZOFRAN) injection 8 mg  -     EPINEPHrine PF 1 MG/ML injection (Anaphylaxis) 0.3 mg  -     albuterol sulfate HFA (PROVENTIL;VENTOLIN;PROAIR) 108 (90 Base) MCG/ACT inhaler 4 puff  -     sodium chloride (PF) 0.9 % injection 5-40 mL  -     0.9 % sodium chloride infusion  -     alteplase (CATHFLO) 2 mg in sterile water 2 mL injection  -     0.9 % sodium chloride infusion  -     potassium chloride 10 mEq, magnesium sulfate 1,000 mg in sodium chloride 0.9 % 500 mL IVPB  -     dexamethasone (DECADRON) 10 mg in sodium chloride 0.9 % 50 mL IVPB  -     furosemide (LASIX) injection 20 mg  -     fosaprepitant (EMEND) 150 mg in sodium chloride 0.9 % 150 mL IVPB  -     palonosetron (ALOXI) injection 0.25 mg  -     vinBLAStine (VELBAN) 6 mg in sodium chloride 0.9 % 50 mL chemo IVPB  -     DOXOrubicin HCl (ADRIAMYCIN) 64 mg in sodium chloride 0.9 % 100 mL chemo IVPB  -     CISplatin (PLATINOL) 148 mg in sodium chloride 0.9 % 500 mL chemo IVPB  -     pegfilgrastim (NEULASTA) on-body injector 6 mg  -     potassium chloride 10 mEq, magnesium sulfate 1,000 mg in sodium chloride 0.9 % 500 mL IVPB  -     0.9 % sodium chloride infusion  -     diphenhydrAMINE (BENADRYL) injection 50 mg  -     famotidine (PEPCID) injection 20 mg  -     hydrocortisone sodium succinate PF (SOLU-CORTEF) injection 100 mg  -     acetaminophen (TYLENOL) tablet 650 mg  -     meperidine (DEMEROL) injection 12.5 mg  -     ondansetron (ZOFRAN) injection 8 mg  - EPINEPHrine PF 1 MG/ML injection (Anaphylaxis) 0.3 mg  -     albuterol sulfate HFA (PROVENTIL;VENTOLIN;PROAIR) 108 (90 Base) MCG/ACT inhaler 4 puff  -     sodium chloride flush 0.9 % injection 5-40 mL  -     sodium chloride (PF) 0.9 % injection 5-40 mL  -     0.9 % sodium chloride infusion  -     heparin flush 100 UNIT/ML injection 500 Units  -     alteplase (CATHFLO) 2 mg in sterile water 2 mL injection       [id number] muscle invasive bladder cancer, June 2022  Discussed NCCN guidelines. Recommend consultation [place] with urology. Recommend neoadjuvant chemotherapy dose dense MVAC x4 cycles with G-CSF support.  -Completed bone scan for staging that showed no evidence of metastatic disease. Patient has complaints of back pain  -8/15/2022-consultation [facility]rology [facility]. Plans for cystoprostatectomy after neoadjuvant chemotherapy. Treatment regimen:  ddMVAC, G-CSF support    Proceed cycle #3 dose dense MVAC with G-CSF support    Treatment related toxicity-fatigue. Denies any nausea. No vomiting. No diarrhea. Neutrophil leukocytosis-secondary to G-CSF  -Afebrile  -Continue to monitor    PLAN:  RTC with MD in treatment room 2 weeks  C# 3/4 neoadjuvant chemotherapy with dose dense Methotrexate, Vinblastine, Adriamycin, Cisplatin + GCSF today every 14 days  Continue Zofran and Phenergan as needed for nausea  Proceed with Dr [facility] for surgery         ICosmo am pre charting  as Medical Assistant for Abigail Burnett MD. Electronically signed by Cosmo Brooks MA on 9/28/2022 at 1:44 PM CDT. Kat Bills am scribing for Abigail Burnett MD. Electronically signed by Fredi Mattson RN on 9/28/2022 at 9:31 AM CDT. I, Dr Sergei Scott, personally performed the services described in this documentation as scribed by Fredi Mattson RN in my presence and is both accurate and complete.     I have seen, examined and reviewed this patient medication list, appropriate labs and imaging studies. I reviewed relevant medical records and others physicians notes. I discussed the plans of care with the patient. I answered all the questions to the patients satisfaction. I have also reviewed the chief complaint (CC) and part of the history (History of Present Illness (HPI), Past Family Social History [facility]), or Review of Systems (ROS) and made changes when appropriated.        (Please note that portions of this note were completed with a voice recognition program. Efforts were made to edit the dictations but occasionally words are mis-transcribed.)  Electronically signed by Camila Jennings MD on 9/28/2022 at 9:38 AM

## 2022-09-28 ENCOUNTER — HOSPITAL ENCOUNTER (OUTPATIENT)
Dept: INFUSION THERAPY | Age: 72
Discharge: HOME | End: 2022-09-28
Payer: MEDICARE

## 2022-09-28 ENCOUNTER — OFFICE VISIT (OUTPATIENT)
Dept: HEMATOLOGY | Age: 72
End: 2022-09-28
Payer: MEDICARE

## 2022-09-28 VITALS
HEIGHT: 75 IN | TEMPERATURE: 97.6 F | RESPIRATION RATE: 18 BRPM | OXYGEN SATURATION: 98 % | DIASTOLIC BLOOD PRESSURE: 76 MMHG | HEART RATE: 71 BPM | SYSTOLIC BLOOD PRESSURE: 146 MMHG | BODY MASS INDEX: 23.04 KG/M2 | WEIGHT: 185.3 LBS

## 2022-09-28 DIAGNOSIS — C67.8 MALIGNANT NEOPLASM OF OVERLAPPING SITES OF BLADDER (HCC): Primary | ICD-10-CM

## 2022-09-28 DIAGNOSIS — T45.1X5D ADVERSE EFFECT OF CHEMOTHERAPY, SUBSEQUENT ENCOUNTER: ICD-10-CM

## 2022-09-28 DIAGNOSIS — Z01.818 EXAMINATION PRIOR TO CHEMOTHERAPY: ICD-10-CM

## 2022-09-28 DIAGNOSIS — Z71.89 CARE PLAN DISCUSSED WITH PATIENT: ICD-10-CM

## 2022-09-28 DIAGNOSIS — C67.9 MALIGNANT NEOPLASM OF URINARY BLADDER, UNSPECIFIED SITE (HCC): ICD-10-CM

## 2022-09-28 DIAGNOSIS — Z51.11 CHEMOTHERAPY MANAGEMENT, ENCOUNTER FOR: ICD-10-CM

## 2022-09-28 LAB
ALBUMIN SERPL-MCNC: 3.9 G/DL (ref 3.5–5.2)
ALP BLD-CCNC: 102 U/L (ref 40–130)
ALT SERPL-CCNC: 22 U/L (ref 21–72)
ANION GAP SERPL CALCULATED.3IONS-SCNC: 9 MMOL/L (ref 7–19)
AST SERPL-CCNC: 25 U/L (ref 17–59)
BILIRUB SERPL-MCNC: <0.2 MG/DL (ref 0.2–1.3)
BUN BLDV-MCNC: 10 MG/DL (ref 9–20)
CALCIUM SERPL-MCNC: 8.9 MG/DL (ref 8.4–10.2)
CHLORIDE BLD-SCNC: 103 MMOL/L (ref 98–111)
CO2: 27 MMOL/L (ref 22–29)
CREAT SERPL-MCNC: 0.7 MG/DL (ref 0.6–1.2)
GFR NON-AFRICAN AMERICAN: >60
GLOBULIN: 2.6 G/DL
GLUCOSE BLD-MCNC: 156 MG/DL (ref 74–106)
HCT VFR BLD CALC: 35.5 % (ref 40.1–51)
HEMOGLOBIN: 11.8 G/DL (ref 13.7–17.5)
LYMPHOCYTES ABSOLUTE: 1.58 K/UL (ref 1.18–3.74)
LYMPHOCYTES RELATIVE PERCENT: 9.9 % (ref 19.3–53.1)
MCH RBC QN AUTO: 32.3 PG (ref 25.7–32.2)
MCHC RBC AUTO-ENTMCNC: 33.2 G/DL (ref 32.3–36.5)
MCV RBC AUTO: 97.3 FL (ref 79–92.2)
MONOCYTES ABSOLUTE: 1.2 K/UL (ref 0.24–0.82)
MONOCYTES RELATIVE PERCENT: 7.5 % (ref 4.7–12.5)
NEUTROPHILS ABSOLUTE: 11.47 K/UL (ref 1.56–6.13)
NEUTROPHILS RELATIVE PERCENT: 71.4 % (ref 34–71.1)
PDW BLD-RTO: 14.6 % (ref 11.6–14.4)
PLATELET # BLD: 137 K/UL (ref 163–337)
PMV BLD AUTO: 10.4 FL (ref 7.4–10.4)
POTASSIUM SERPL-SCNC: 4 MMOL/L (ref 3.5–5.1)
RBC # BLD: 3.65 M/UL (ref 4.63–6.08)
SODIUM BLD-SCNC: 139 MMOL/L (ref 137–145)
TOTAL PROTEIN: 6.5 G/DL (ref 6.3–8.2)
WBC # BLD: 16.04 K/UL (ref 4.23–9.07)

## 2022-09-28 PROCEDURE — 96367 TX/PROPH/DG ADDL SEQ IV INF: CPT

## 2022-09-28 PROCEDURE — 96413 CHEMO IV INFUSION 1 HR: CPT

## 2022-09-28 PROCEDURE — 1123F ACP DISCUSS/DSCN MKR DOCD: CPT | Performed by: INTERNAL MEDICINE

## 2022-09-28 PROCEDURE — 99214 OFFICE O/P EST MOD 30 MIN: CPT | Performed by: INTERNAL MEDICINE

## 2022-09-28 PROCEDURE — 6360000002 HC RX W HCPCS: Performed by: INTERNAL MEDICINE

## 2022-09-28 PROCEDURE — 85025 COMPLETE CBC W/AUTO DIFF WBC: CPT

## 2022-09-28 PROCEDURE — 2580000003 HC RX 258: Performed by: INTERNAL MEDICINE

## 2022-09-28 PROCEDURE — 80053 COMPREHEN METABOLIC PANEL: CPT

## 2022-09-28 RX ORDER — EPINEPHRINE 1 MG/ML
0.3 INJECTION, SOLUTION, CONCENTRATE INTRAVENOUS PRN
Status: CANCELLED | OUTPATIENT
Start: 2022-09-29

## 2022-09-28 RX ORDER — ALBUTEROL SULFATE 90 UG/1
4 AEROSOL, METERED RESPIRATORY (INHALATION) PRN
Status: CANCELLED | OUTPATIENT
Start: 2022-09-28

## 2022-09-28 RX ORDER — ACETAMINOPHEN 325 MG/1
650 TABLET ORAL
Status: CANCELLED | OUTPATIENT
Start: 2022-09-28

## 2022-09-28 RX ORDER — MEPERIDINE HYDROCHLORIDE 50 MG/ML
12.5 INJECTION INTRAMUSCULAR; INTRAVENOUS; SUBCUTANEOUS PRN
Status: CANCELLED | OUTPATIENT
Start: 2022-09-28

## 2022-09-28 RX ORDER — SODIUM CHLORIDE 9 MG/ML
5-250 INJECTION, SOLUTION INTRAVENOUS PRN
Status: CANCELLED | OUTPATIENT
Start: 2022-09-29

## 2022-09-28 RX ORDER — SODIUM CHLORIDE 9 MG/ML
5-250 INJECTION, SOLUTION INTRAVENOUS PRN
Status: DISCONTINUED | OUTPATIENT
Start: 2022-09-28 | End: 2022-09-29 | Stop reason: HOSPADM

## 2022-09-28 RX ORDER — SODIUM CHLORIDE 9 MG/ML
INJECTION, SOLUTION INTRAVENOUS CONTINUOUS
Status: CANCELLED | OUTPATIENT
Start: 2022-09-29

## 2022-09-28 RX ORDER — FAMOTIDINE 10 MG/ML
20 INJECTION, SOLUTION INTRAVENOUS
Status: CANCELLED | OUTPATIENT
Start: 2022-09-29

## 2022-09-28 RX ORDER — FUROSEMIDE 10 MG/ML
20 INJECTION INTRAMUSCULAR; INTRAVENOUS ONCE
Status: CANCELLED
Start: 2022-09-29 | End: 2022-09-29

## 2022-09-28 RX ORDER — MEPERIDINE HYDROCHLORIDE 50 MG/ML
12.5 INJECTION INTRAMUSCULAR; INTRAVENOUS; SUBCUTANEOUS PRN
Status: CANCELLED | OUTPATIENT
Start: 2022-09-29

## 2022-09-28 RX ORDER — HEPARIN SODIUM (PORCINE) LOCK FLUSH IV SOLN 100 UNIT/ML 100 UNIT/ML
500 SOLUTION INTRAVENOUS PRN
Status: CANCELLED | OUTPATIENT
Start: 2022-09-29

## 2022-09-28 RX ORDER — FAMOTIDINE 10 MG/ML
20 INJECTION, SOLUTION INTRAVENOUS
Status: CANCELLED | OUTPATIENT
Start: 2022-09-28

## 2022-09-28 RX ORDER — SODIUM CHLORIDE 9 MG/ML
5-40 INJECTION INTRAVENOUS PRN
Status: CANCELLED | OUTPATIENT
Start: 2022-09-28

## 2022-09-28 RX ORDER — SODIUM CHLORIDE 0.9 % (FLUSH) 0.9 %
5-40 SYRINGE (ML) INJECTION PRN
Status: DISCONTINUED | OUTPATIENT
Start: 2022-09-28 | End: 2022-09-29 | Stop reason: HOSPADM

## 2022-09-28 RX ORDER — ONDANSETRON 2 MG/ML
8 INJECTION INTRAMUSCULAR; INTRAVENOUS
Status: CANCELLED | OUTPATIENT
Start: 2022-09-28

## 2022-09-28 RX ORDER — SODIUM CHLORIDE 9 MG/ML
5-250 INJECTION, SOLUTION INTRAVENOUS PRN
Status: CANCELLED | OUTPATIENT
Start: 2022-09-28

## 2022-09-28 RX ORDER — SODIUM CHLORIDE 0.9 % (FLUSH) 0.9 %
5-40 SYRINGE (ML) INJECTION PRN
Status: CANCELLED | OUTPATIENT
Start: 2022-09-28

## 2022-09-28 RX ORDER — SODIUM CHLORIDE 9 MG/ML
5-40 INJECTION INTRAVENOUS PRN
Status: CANCELLED | OUTPATIENT
Start: 2022-09-29

## 2022-09-28 RX ORDER — ALBUTEROL SULFATE 90 UG/1
4 AEROSOL, METERED RESPIRATORY (INHALATION) PRN
Status: CANCELLED | OUTPATIENT
Start: 2022-09-29

## 2022-09-28 RX ORDER — SODIUM CHLORIDE 0.9 % (FLUSH) 0.9 %
5-40 SYRINGE (ML) INJECTION PRN
Status: CANCELLED | OUTPATIENT
Start: 2022-09-29

## 2022-09-28 RX ORDER — ONDANSETRON 2 MG/ML
8 INJECTION INTRAMUSCULAR; INTRAVENOUS
Status: CANCELLED | OUTPATIENT
Start: 2022-09-29

## 2022-09-28 RX ORDER — SODIUM CHLORIDE 9 MG/ML
INJECTION, SOLUTION INTRAVENOUS CONTINUOUS
Status: CANCELLED | OUTPATIENT
Start: 2022-09-28

## 2022-09-28 RX ORDER — EPINEPHRINE 1 MG/ML
0.3 INJECTION, SOLUTION, CONCENTRATE INTRAVENOUS PRN
Status: CANCELLED | OUTPATIENT
Start: 2022-09-28

## 2022-09-28 RX ORDER — HEPARIN SODIUM (PORCINE) LOCK FLUSH IV SOLN 100 UNIT/ML 100 UNIT/ML
500 SOLUTION INTRAVENOUS PRN
Status: DISCONTINUED | OUTPATIENT
Start: 2022-09-28 | End: 2022-09-29 | Stop reason: HOSPADM

## 2022-09-28 RX ORDER — DIPHENHYDRAMINE HYDROCHLORIDE 50 MG/ML
50 INJECTION INTRAMUSCULAR; INTRAVENOUS
Status: CANCELLED | OUTPATIENT
Start: 2022-09-28

## 2022-09-28 RX ORDER — HEPARIN SODIUM (PORCINE) LOCK FLUSH IV SOLN 100 UNIT/ML 100 UNIT/ML
500 SOLUTION INTRAVENOUS PRN
Status: CANCELLED | OUTPATIENT
Start: 2022-09-28

## 2022-09-28 RX ORDER — PALONOSETRON 0.05 MG/ML
0.25 INJECTION, SOLUTION INTRAVENOUS ONCE
Status: CANCELLED | OUTPATIENT
Start: 2022-09-29 | End: 2022-09-29

## 2022-09-28 RX ORDER — DIPHENHYDRAMINE HYDROCHLORIDE 50 MG/ML
50 INJECTION INTRAMUSCULAR; INTRAVENOUS
Status: CANCELLED | OUTPATIENT
Start: 2022-09-29

## 2022-09-28 RX ORDER — ACETAMINOPHEN 325 MG/1
650 TABLET ORAL
Status: CANCELLED | OUTPATIENT
Start: 2022-09-29

## 2022-09-28 RX ADMIN — SODIUM CHLORIDE, PRESERVATIVE FREE 10 ML: 5 INJECTION INTRAVENOUS at 11:04

## 2022-09-28 RX ADMIN — METHOTREXATE 63.25 MG: 25 INJECTION, SOLUTION INTRA-ARTERIAL; INTRAMUSCULAR; INTRAVENOUS at 10:32

## 2022-09-28 RX ADMIN — DEXAMETHASONE SODIUM PHOSPHATE 16 MG: 10 INJECTION, SOLUTION INTRAMUSCULAR; INTRAVENOUS at 09:59

## 2022-09-28 RX ADMIN — SODIUM CHLORIDE 20 ML/HR: 9 INJECTION, SOLUTION INTRAVENOUS at 09:58

## 2022-09-28 RX ADMIN — HEPARIN 500 UNITS: 100 SYRINGE at 11:04

## 2022-09-29 ENCOUNTER — HOSPITAL ENCOUNTER (OUTPATIENT)
Dept: INFUSION THERAPY | Age: 72
Discharge: HOME | End: 2022-09-29
Payer: MEDICARE

## 2022-09-29 VITALS
TEMPERATURE: 98 F | HEART RATE: 70 BPM | OXYGEN SATURATION: 99 % | DIASTOLIC BLOOD PRESSURE: 76 MMHG | SYSTOLIC BLOOD PRESSURE: 132 MMHG | RESPIRATION RATE: 18 BRPM

## 2022-09-29 DIAGNOSIS — C67.8 MALIGNANT NEOPLASM OF OVERLAPPING SITES OF BLADDER (HCC): Primary | ICD-10-CM

## 2022-09-29 DIAGNOSIS — C67.9 MALIGNANT NEOPLASM OF URINARY BLADDER, UNSPECIFIED SITE (HCC): ICD-10-CM

## 2022-09-29 PROCEDURE — 96366 THER/PROPH/DIAG IV INF ADDON: CPT

## 2022-09-29 PROCEDURE — 96411 CHEMO IV PUSH ADDL DRUG: CPT

## 2022-09-29 PROCEDURE — 96413 CHEMO IV INFUSION 1 HR: CPT

## 2022-09-29 PROCEDURE — 96368 THER/DIAG CONCURRENT INF: CPT

## 2022-09-29 PROCEDURE — 96367 TX/PROPH/DG ADDL SEQ IV INF: CPT

## 2022-09-29 PROCEDURE — 96377 APPLICATON ON-BODY INJECTOR: CPT

## 2022-09-29 PROCEDURE — 96375 TX/PRO/DX INJ NEW DRUG ADDON: CPT

## 2022-09-29 PROCEDURE — 96361 HYDRATE IV INFUSION ADD-ON: CPT

## 2022-09-29 PROCEDURE — 2580000003 HC RX 258: Performed by: INTERNAL MEDICINE

## 2022-09-29 PROCEDURE — 96415 CHEMO IV INFUSION ADDL HR: CPT

## 2022-09-29 PROCEDURE — 96417 CHEMO IV INFUS EACH ADDL SEQ: CPT

## 2022-09-29 PROCEDURE — 96360 HYDRATION IV INFUSION INIT: CPT

## 2022-09-29 PROCEDURE — 6360000002 HC RX W HCPCS: Performed by: INTERNAL MEDICINE

## 2022-09-29 RX ORDER — SODIUM CHLORIDE 0.9 % (FLUSH) 0.9 %
5-40 SYRINGE (ML) INJECTION PRN
Status: DISCONTINUED | OUTPATIENT
Start: 2022-09-29 | End: 2022-09-30 | Stop reason: HOSPADM

## 2022-09-29 RX ORDER — SODIUM CHLORIDE 9 MG/ML
5-250 INJECTION, SOLUTION INTRAVENOUS PRN
Status: DISCONTINUED | OUTPATIENT
Start: 2022-09-29 | End: 2022-09-30 | Stop reason: HOSPADM

## 2022-09-29 RX ORDER — HEPARIN SODIUM (PORCINE) LOCK FLUSH IV SOLN 100 UNIT/ML 100 UNIT/ML
500 SOLUTION INTRAVENOUS PRN
Status: DISCONTINUED | OUTPATIENT
Start: 2022-09-29 | End: 2022-09-30 | Stop reason: HOSPADM

## 2022-09-29 RX ORDER — PALONOSETRON 0.05 MG/ML
0.25 INJECTION, SOLUTION INTRAVENOUS ONCE
Status: COMPLETED | OUTPATIENT
Start: 2022-09-29 | End: 2022-09-29

## 2022-09-29 RX ORDER — FUROSEMIDE 10 MG/ML
20 INJECTION INTRAMUSCULAR; INTRAVENOUS ONCE
Status: COMPLETED | OUTPATIENT
Start: 2022-09-29 | End: 2022-09-29

## 2022-09-29 RX ADMIN — CISPLATIN 148 MG: 1 INJECTION INTRAVENOUS at 10:37

## 2022-09-29 RX ADMIN — PEGFILGRASTIM 6 MG: KIT SUBCUTANEOUS at 12:19

## 2022-09-29 RX ADMIN — PALONOSETRON HYDROCHLORIDE 0.25 MG: 0.25 INJECTION, SOLUTION INTRAVENOUS at 08:52

## 2022-09-29 RX ADMIN — FUROSEMIDE 20 MG: 10 INJECTION, SOLUTION INTRAMUSCULAR; INTRAVENOUS at 10:54

## 2022-09-29 RX ADMIN — HEPARIN 500 UNITS: 100 SYRINGE at 13:31

## 2022-09-29 RX ADMIN — SODIUM CHLORIDE, PRESERVATIVE FREE 10 ML: 5 INJECTION INTRAVENOUS at 13:31

## 2022-09-29 RX ADMIN — DEXAMETHASONE SODIUM PHOSPHATE: 10 INJECTION, SOLUTION INTRAMUSCULAR; INTRAVENOUS at 08:52

## 2022-09-29 RX ADMIN — POTASSIUM CHLORIDE: 2 INJECTION, SOLUTION, CONCENTRATE INTRAVENOUS at 10:54

## 2022-09-29 RX ADMIN — FOSAPREPITANT 150 MG: 150 INJECTION, POWDER, LYOPHILIZED, FOR SOLUTION INTRAVENOUS at 09:03

## 2022-09-29 RX ADMIN — POTASSIUM CHLORIDE: 2 INJECTION, SOLUTION, CONCENTRATE INTRAVENOUS at 09:46

## 2022-09-29 RX ADMIN — VINBLASTINE SULFATE 6 MG: 1 INJECTION INTRAVENOUS at 09:47

## 2022-09-29 RX ADMIN — SODIUM CHLORIDE 64 MG: 9 INJECTION, SOLUTION INTRAVENOUS at 10:05

## 2022-09-30 DIAGNOSIS — C67.8 MALIGNANT NEOPLASM OF OVERLAPPING SITES OF BLADDER (HCC): Primary | ICD-10-CM

## 2022-09-30 NOTE — PROGRESS NOTES
Dr Suni Thakkar spoke to Dr Emma Wheatley (office fellow)/[facility] who recommended for patient to have CT chest and CT urogram in 1 month here. Call to Mrs Haider Santana to explain above who voiced understanding.

## 2022-10-10 NOTE — PROGRESS NOTES
MEDICAL ONCOLOGY PROGRESS NOTE    Pt Name: Denise Berman  MRN: [id number]  YOB: 1950  Date of evaluation: 10/12/2022    HISTORY OF PRESENT ILLNESS:    Reason for MD visit-toxicity assessment/disease management. The patient is currently receiving neoadjuvant chemotherapy with dose dense MVAC for his diagnosis of muscle invasive bladder cancer. He has received 2 cycles of chemotherapy. He is here for cycle #4 dose dense MVAC. He has been tolerated with complaints of some fatigue. Denies any other major side effects. His CT scans are scheduled to assess disease status after completion of neoadjuvant chemotherapy. He has appointment with urology at [facility]. Diagnosis  Muscle invasive high grade urothelial carcinoma, bladder, June 2022  Stage II, [id number]    Treatment Summary  6/29/22- TURBT by Dr Kristina Quispe  8/31/22-10/13/2022-completion of 4 cycles neoadjuvant chemotherapy dose dense MVAC with G-CSF support  Anticipated cystoprostatectomy. Cancer History  Adalgisa Pruitt was first seen by me on 8/10/2022. He was referred by urology, [facility] for diagnosis of muscle invasive bladder cancer. Patient presented with complaints of hematuria in June 2022. Imaging studies showed sessile mass involving the right side of his bladder. No pelvic respiratory adenopathy. No evidence of distant metastasis. Cystoscopy was performed for transurethral resection of bladder tumor. Biopsy consistent with muscle invasive disease. Therefore, he was referred to me and also urology at [facility]. The patient denies any major comorbidities. He is quite functional.  Patient is a current smoker. 5/6/22 CT abd/pelvis: Sessile appearing mass in the base of the bladder on the right, measuring approximately 3.7 x 2.7 cm, this is compatible with bladder neoplasm until proven otherwise. Consider follow-up with direct visualization and biopsy. The mass partially covers the right ureteral orifice. There is no urinary tract obstruction and the kidneys and ureters appear unremarkable except for a 2 mm nonobstructing stone in the right mid kidney. No intra-abdominal or pelvic lymphadenopathy is identified. Coarse calcifications within the pancreas compatible chronic pancreatitis. Chronic bilateral sacroiliitis. Chronic-appearing superior endplate compression fracture of L1 with associated Schmorl's node defect. No definite osseous metastases. 6/29/22 Cystoscopy/TURBT by Dr Federico Hurst:  Normal left retrograde pyelogram.  Right ureteral orifice adjacent to bladder tumor which was lateral.  Did not have to resect the orifice. Right retrograde showed narrowing at the UVJ and some mild ureteral dilation no filling defect. Right ureteral stent placed after bladder tumor resection 6 [place] by 20 cm. Will remain in for 6 weeks. Papillary bladder tumor multiple overlapping sites at the bladder neck at 630 and smaller papillary tumor at 12:00, between the bladder neck and trigone, mid trigone, and large greater than 5 cm solid tumor involving the right trigone laterally and right lateral wall. This was completely resected down to visible muscle. Possible gross muscle invasion  6/29/22 Bladder tumor, transurethral resection: Invasive high-grade urothelial carcinoma. The tumor invades the muscularis propria. 7/20/22 CT CHEST W CONTRAST  No acute chest pathology. Paraseptal emphysema in both upper lobes and lower lobes. Centrilobular emphysematous changes in bilateral upper lobes. Other nonacute findings above. Comparison: Radiographs of chest dated 06/23/2022.   7/20/22 CT ABDOMEN PELVIS W IV CONTRAST Postop changes in the urinary bladder as described above. Status post right sided double J ureteral stents placement. Evidence of prior granulomatous disease 4. Superior and play compression fracture at L1 with 50% loss of vertebral body height.     7/27/2022 BLADDER, TRANSURETHRAL RESECTION ([place]):  Invasive high-grade papillary urothelial carcinoma. Tumor is invasive into the muscularis propria.  08/10/22- he was first seen by me. Discussed NCCN guidelines. Recommend consultation [place] with urology. Recommend neoadjuvant chemotherapy dose dense MVAC x4 cycles with G-CSF support. 8/15/2022-the patient was seen by Brandt Peres urology at [facility]. Plans for surgery after neoadjuvant chemotherapy. The patient will need repeat CT chest and CT urogram.  8/23/2022 2D Echocardiogram  Normal left ventricular size with preserved LV function and an estimated  ejection fraction of approximately 55-60%. Normal left ventricular wall thickness. No regional wall motion abnormalities. 8/26/2022 NM Bone Scan No activity is noted to suggest metastatic disease. 8/31/22 Initiated 4 cycles neoadjuvant chemotherapy dose dense MVAC with G-CSF support  9/9/2022 CXR Trace bibasilar atelectasis  10/13/2022-anticipated completion of 4 cycles neoadjuvant chemotherapy with dose dense MVAC. Patient will follow-up with CT scans and urology for surgery.     Past Medical History:    Past Medical History:   Diagnosis Date    COPD (chronic obstructive pulmonary disease) ([address]     Nocturia 6/27/2019    Positive colorectal cancer screening using Cologuard test 10/8/2019       Past Surgical History:    Past Surgical History:   Procedure Laterality Date    BLADDER TUMOR EXCISION      CATARACT REMOVAL Bilateral     COLONOSCOPY N/A 11/7/2019    Dr Cassidy Eddy-Tubulovillous AP, (-) dysplasia x 1, tubular AP, (-) dysplasia x 4, BCM x 1, 3 yr recall    CYSTOSCOPY N/A 6/29/2022    CYSTOSCOPY TRANSURETHRAL RESECTION BLADDER TUMOR GREATER THAN 5CM performed by Christopher Valel MD at [address] Bilateral 6/29/2022    BILATERAL URETERAL CATHETERIZATION BILATERAL RETROGRADE PYELOGRAM performed by Christopher Valle MD at [address] Right 6/29/2022    RIGHT URETERAL STENT INSERTION performed by Christopher Valle MD at [facility] OR    FRACTURE SURGERY Right     shoulder, ball and joint    HUMERUS FRACTURE SURGERY Right     LEG SURGERY Right     femur fracture    PORT SURGERY N/A 9/9/2022    PORT INSERTION performed by Eulogio Curran DO at [address] Right 1965    shoulder fracture, ORIF, football injury       Social History:    Marital status:   Smoking status: Currently smoker-x40 years  ETOH status:  Resides: [place]    Family History:   Family History   Problem Relation Age of Onset    Diabetes Mother     Colon Polyps Mother     Cancer Father         Lung    Lung Cancer Father     No Known Problems Sister     Other Brother         disability with back    No Known Problems Maternal Grandmother     Heart Disease Maternal Grandfather     No Known Problems Paternal Grandmother     No Known Problems Paternal Grandfather     Pancreatic Cancer Daughter     Diabetes type 2  Daughter     Thyroid Disease Daughter     High Cholesterol Daughter     Colon Cancer Neg Hx     Esophageal Cancer Neg Hx     Liver Cancer Neg Hx     Liver Disease Neg Hx     Rectal Cancer Neg Hx     Stomach Cancer Neg Hx        Current Hospital Medications:    Current Outpatient Medications   Medication Sig Dispense Refill    Ondansetron HCl (ZOFRAN PO) Take by mouth       No current facility-administered medications for this visit.      Facility-Administered Medications Ordered in Other Visits   Medication Dose Route Frequency Provider Last Rate Last Admin    0.9 % sodium chloride infusion  5-250 mL/hr IntraVENous PRN Mariano Muñoz MD        ondansetron (ZOFRAN) 16 mg, dexamethasone (DECADRON) 10 mg in sodium chloride 0.9 % 50 mL IVPB  16 mg IntraVENous Once Mariano Muñoz MD        methotrexate Sodium 63.25 mg in sodium chloride 0.9 % 100 mL chemo IVPB  30 mg/m2 (Treatment Plan Recorded) IntraVENous Once Mariano Muñoz MD        sodium chloride flush 0.9 % injection 5-40 mL  5-40 mL IntraVENous PRN Miracle Garner, MD        heparin flush 100 UNIT/ML injection 500 Units  500 Units IntraCATHeter PRN Dionne Lizama MD           Allergies: No Known Allergies      Subjective   REVIEW OF SYSTEMS:   CONSTITUTIONAL: no fever, no night sweats,  fatigue;  HEENT: no blurring of vision, no double vision, no hearing difficulty, no tinnitus, no ulceration, no dysplasia, no epistaxis;   LUNGS: no cough, no hemoptysis, no wheeze,  no shortness of breath;  CARDIOVASCULAR: no palpitation, no chest pain, no shortness of breath;  GI: no abdominal pain, no nausea, no vomiting, no diarrhea, no constipation;  GIDEON: mild dysuria, no hematuria, no frequency or urgency, no nephrolithiasis;  MUSCULOSKELETAL: mild back pain, no joint pain, no swelling, no stiffness;  ENDOCRINE: no polyuria, no polydipsia, no cold or heat intolerance;  HEMATOLOGY: no easy bruising or bleeding, no history of clotting disorder;  DERMATOLOGY: no skin rash, no eczema, no pruritus;  PSYCHIATRY: no depression, no anxiety, no panic attacks, no suicidal ideation, no homicidal ideation;  NEUROLOGY: no syncope, no seizures, no numbness or tingling of hands, no numbness or tingling of feet, no paresis;      Objective   BP (!) 143/70   Pulse 77   Temp 97.9 °F (36.6 °C)   Resp 20   Ht 6' 3\" (1.905 m)   Wt 182 lb 9.6 oz (82.8 kg)   SpO2 97%   BMI 22.82 kg/m²     PHYSICAL EXAM:  CONSTITUTIONAL: Alert, appropriate, no acute distress  EYES: Non icteric, EOM intact, pupils equal round   ENT: Mucus membranes moist, no oral pharyngeal lesions, external inspection of ears and nose are normal  NECK: Supple, no masses. No palpable thyroid mass  CHEST/LUNGS: CTA bilaterally, normal respiratory effort   CARDIOVASCULAR: RRR, no murmurs. No lower extremity edema  ABDOMEN: soft non-tender, active bowel sounds, no HSM. No palpable masses  EXTREMITIES: warm, full ROM in all 4 extremities, no focal weakness.   SKIN: warm, dry with no rashes or lesions  LYMPH: No cervical, clavicular, axillary, or inguinal lymphadenopathy  NEUROLOGIC: follows commands, non focal   PSYCH: mood and affect appropriate. Alert and oriented to time, place, person      LABORATORY RESULTS REVIEWED/ANALYZED BY ME:  Lab Results   Component Value Date    WBC 13.09 (H) 10/12/2022    HGB 10.9 (L) 10/12/2022    HCT 32.4 (L) 10/12/2022    MCV 96.7 (H) 10/12/2022     (L) 10/12/2022     Lab Results   Component Value Date    NEUTROABS 9.29 (H) 10/12/2022     Lab Results   Component Value Date     10/12/2022    K 4.1 10/12/2022     10/12/2022    CO2 26 10/12/2022    BUN 12 10/12/2022    CREATININE 0.7 10/12/2022    GLUCOSE 119 (H) 10/12/2022    CALCIUM 9.1 10/12/2022    PROT 6.5 10/12/2022    LABALBU 4.1 10/12/2022    BILITOT <0.2 10/12/2022    ALKPHOS 99 10/12/2022    AST 27 10/12/2022    ALT 23 10/12/2022    LABGLOM >60 10/12/2022    GFRAA >60 07/20/2022    GLOB 2.4 10/12/2022           RADIOLOGY STUDIES REVIEWED BY ME:  None    ASSESSMENT:    Orders Placed This Encounter   Procedures    CBC with Auto Differential     Standing Status:   Future     Number of Occurrences:   1     Standing Expiration Date:   10/12/2023    Comprehensive Metabolic Panel     Standing Status:   Future     Number of Occurrences:   1     Standing Expiration Date:   10/12/2023            Daiva Phalen was seen today for bladder cancer. Diagnoses and all orders for this visit:    Malignant neoplasm of overlapping sites of bladder ([facility] [address]  -     CBC with Auto Differential; Future  -     Comprehensive Metabolic Panel;  Future  -     Cancel: 0.9 % sodium chloride infusion  -     Cancel: ondansetron (ZOFRAN) 16 mg, dexamethasone (DECADRON) 10 mg in sodium chloride 0.9 % 50 mL IVPB  -     Cancel: methotrexate 63.25 mg in sodium chloride 0.9 % 100 mL chemo IVPB  -     Cancel: sodium chloride flush 0.9 % injection 5-40 mL  -     Cancel: heparin flush 100 UNIT/ML injection 500 Units    Chemotherapy management, encounter for    Adverse effect of chemotherapy, subsequent encounter    Care plan discussed with patient    Other orders  -     0.9 % sodium chloride infusion  -     diphenhydrAMINE (BENADRYL) injection 50 mg  -     famotidine (PEPCID) injection 20 mg  -     hydrocortisone sodium succinate PF (SOLU-CORTEF) injection 100 mg  -     acetaminophen (TYLENOL) tablet 650 mg  -     meperidine (DEMEROL) injection 12.5 mg  -     ondansetron (ZOFRAN) injection 8 mg  -     EPINEPHrine PF 1 MG/ML injection (Anaphylaxis) 0.3 mg  -     albuterol sulfate HFA (PROVENTIL;VENTOLIN;PROAIR) 108 (90 Base) MCG/ACT inhaler 4 puff  -     sodium chloride (PF) 0.9 % injection 5-40 mL  -     0.9 % sodium chloride infusion  -     alteplase (CATHFLO) 2 mg in sterile water 2 mL injection  -     0.9 % sodium chloride infusion  -     potassium chloride 10 mEq, magnesium sulfate 1,000 mg in sodium chloride 0.9 % 500 mL IVPB  -     dexamethasone (DECADRON) 10 mg in sodium chloride 0.9 % 50 mL IVPB  -     furosemide (LASIX) injection 20 mg  -     fosaprepitant (EMEND) 150 mg in sodium chloride 0.9 % 150 mL IVPB  -     palonosetron (ALOXI) injection 0.25 mg  -     vinBLAStine (VELBAN) 6 mg in sodium chloride 0.9 % 50 mL chemo IVPB  -     DOXOrubicin HCl (ADRIAMYCIN) 64 mg in sodium chloride 0.9 % 100 mL chemo IVPB  -     CISplatin (PLATINOL) 148 mg in sodium chloride 0.9 % 500 mL chemo IVPB  -     pegfilgrastim (NEULASTA) on-body injector 6 mg  -     potassium chloride 10 mEq, magnesium sulfate 1,000 mg in sodium chloride 0.9 % 500 mL IVPB  -     0.9 % sodium chloride infusion  -     diphenhydrAMINE (BENADRYL) injection 50 mg  -     famotidine (PEPCID) injection 20 mg  -     hydrocortisone sodium succinate PF (SOLU-CORTEF) injection 100 mg  -     acetaminophen (TYLENOL) tablet 650 mg  -     meperidine (DEMEROL) injection 12.5 mg  -     ondansetron (ZOFRAN) injection 8 mg  -     EPINEPHrine PF 1 MG/ML injection (Anaphylaxis) 0.3 mg  -     albuterol sulfate HFA (PROVENTIL;VENTOLIN;PROAIR) 108 (90 Base) MCG/ACT inhaler 4 puff  -     sodium chloride flush 0.9 % injection 5-40 mL  -     sodium chloride (PF) 0.9 % injection 5-40 mL  -     0.9 % sodium chloride infusion  -     heparin flush 100 UNIT/ML injection 500 Units  -     alteplase (CATHFLO) 2 mg in sterile water 2 mL injection         [id number] muscle invasive bladder cancer, June 2022  Discussed NCCN guidelines. Recommend consultation [place] with urology. Recommend neoadjuvant chemotherapy dose dense MVAC x4 cycles with G-CSF support.  -Completed bone scan for staging that showed no evidence of metastatic disease. Patient has complaints of back pain  -8/15/2022-consultation urology at [address] [place] [address]. Plans for cystoprostatectomy after neoadjuvant chemotherapy. Treatment regimen:  ddMVAC, G-CSF support    Proceed cycle #4 dose dense MVAC with G-CSF support  -Proceed with cycle number 4-day 1.  -Proceed with CT chest abdomen pelvis    Treatment related toxicity-fatigue. Denies any nausea. No vomiting. No diarrhea. Neutrophil leukocytosis-secondary to G-CSF  -Afebrile  -Continue to monitor    PLAN:  RTC with MD after 11/4  C# 4/4 neoadjuvant chemotherapy with dose dense Methotrexate, Vinblastine, Adriamycin, Cisplatin + GCSF today every 14 days  Continue Zofran and Phenergan as needed for nausea  CT chest and CT urogram prior to next visit on  11/4  Proceed with Dr Julissa Justice Urology for surgery     IAna am pre charting  as Medical Assistant for Paola Lawson MD. Electronically signed by Ana Noguera MA on 10/12/2022 at 3:38 PM CDT. Alirio Sanchez am scribing for Paola Lawson MD. Electronically signed by Samuel Torres, LUZ MARIA on 10/12/2022 at 8:59 AM CDT. I, Dr Reshma Archuleta, personally performed the services described in this documentation as scribed by Samuel oTrres, RN in my presence and is both accurate and complete.   I have seen, examined and reviewed this patient medication list, appropriate labs and imaging studies. I reviewed relevant medical records and others physicians notes. I discussed the plans of care with the patient. I answered all the questions to the patients satisfaction. I have also reviewed the chief complaint (CC) and part of the history (History of Present Illness (HPI), Past Family Social History [facility]), or Review of Systems (ROS) and made changes when appropriated.        (Please note that portions of this note were completed with a voice recognition program. Efforts were made to edit the dictations but occasionally words are mis-transcribed.)  Electronically signed by Tiffanie Alfred MD on 10/12/2022 at 9:20 AM

## 2022-10-12 ENCOUNTER — HOSPITAL ENCOUNTER (OUTPATIENT)
Dept: INFUSION THERAPY | Age: 72
Discharge: HOME | End: 2022-10-12
Payer: MEDICARE

## 2022-10-12 ENCOUNTER — OFFICE VISIT (OUTPATIENT)
Dept: HEMATOLOGY | Age: 72
End: 2022-10-12
Payer: MEDICARE

## 2022-10-12 VITALS
BODY MASS INDEX: 22.7 KG/M2 | HEART RATE: 77 BPM | TEMPERATURE: 97.9 F | SYSTOLIC BLOOD PRESSURE: 143 MMHG | OXYGEN SATURATION: 97 % | DIASTOLIC BLOOD PRESSURE: 70 MMHG | RESPIRATION RATE: 20 BRPM | WEIGHT: 182.6 LBS | HEIGHT: 75 IN

## 2022-10-12 DIAGNOSIS — Z51.11 CHEMOTHERAPY MANAGEMENT, ENCOUNTER FOR: ICD-10-CM

## 2022-10-12 DIAGNOSIS — T45.1X5A ANTINEOPLASTIC CHEMOTHERAPY INDUCED ANEMIA: ICD-10-CM

## 2022-10-12 DIAGNOSIS — Z01.818 EXAMINATION PRIOR TO CHEMOTHERAPY: ICD-10-CM

## 2022-10-12 DIAGNOSIS — T45.1X5D ADVERSE EFFECT OF CHEMOTHERAPY, SUBSEQUENT ENCOUNTER: ICD-10-CM

## 2022-10-12 DIAGNOSIS — D64.81 ANTINEOPLASTIC CHEMOTHERAPY INDUCED ANEMIA: ICD-10-CM

## 2022-10-12 DIAGNOSIS — C67.9 MALIGNANT NEOPLASM OF URINARY BLADDER, UNSPECIFIED SITE (HCC): ICD-10-CM

## 2022-10-12 DIAGNOSIS — C67.8 MALIGNANT NEOPLASM OF OVERLAPPING SITES OF BLADDER (HCC): Primary | ICD-10-CM

## 2022-10-12 DIAGNOSIS — Z71.89 CARE PLAN DISCUSSED WITH PATIENT: ICD-10-CM

## 2022-10-12 LAB
ALBUMIN SERPL-MCNC: 4.1 G/DL (ref 3.5–5.2)
ALP BLD-CCNC: 99 U/L (ref 40–130)
ALT SERPL-CCNC: 23 U/L (ref 21–72)
ANION GAP SERPL CALCULATED.3IONS-SCNC: 10 MMOL/L (ref 7–19)
AST SERPL-CCNC: 27 U/L (ref 17–59)
BILIRUB SERPL-MCNC: <0.2 MG/DL (ref 0.2–1.3)
BUN BLDV-MCNC: 12 MG/DL (ref 9–20)
CALCIUM SERPL-MCNC: 9.1 MG/DL (ref 8.4–10.2)
CHLORIDE BLD-SCNC: 105 MMOL/L (ref 98–111)
CO2: 26 MMOL/L (ref 22–29)
CREAT SERPL-MCNC: 0.7 MG/DL (ref 0.6–1.2)
GFR NON-AFRICAN AMERICAN: >60
GLOBULIN: 2.4 G/DL
GLUCOSE BLD-MCNC: 119 MG/DL (ref 74–106)
HCT VFR BLD CALC: 32.4 % (ref 40.1–51)
HEMOGLOBIN: 10.9 G/DL (ref 13.7–17.5)
LYMPHOCYTES ABSOLUTE: 1.46 K/UL (ref 1.18–3.74)
LYMPHOCYTES RELATIVE PERCENT: 11.2 % (ref 19.3–53.1)
MCH RBC QN AUTO: 32.5 PG (ref 25.7–32.2)
MCHC RBC AUTO-ENTMCNC: 33.6 G/DL (ref 32.3–36.5)
MCV RBC AUTO: 96.7 FL (ref 79–92.2)
MONOCYTES ABSOLUTE: 1.3 K/UL (ref 0.24–0.82)
MONOCYTES RELATIVE PERCENT: 9.9 % (ref 4.7–12.5)
NEUTROPHILS ABSOLUTE: 9.29 K/UL (ref 1.56–6.13)
NEUTROPHILS RELATIVE PERCENT: 71 % (ref 34–71.1)
PDW BLD-RTO: 15.2 % (ref 11.6–14.4)
PLATELET # BLD: 159 K/UL (ref 163–337)
PMV BLD AUTO: 10.4 FL (ref 7.4–10.4)
POTASSIUM SERPL-SCNC: 4.1 MMOL/L (ref 3.5–5.1)
RBC # BLD: 3.35 M/UL (ref 4.63–6.08)
SODIUM BLD-SCNC: 141 MMOL/L (ref 137–145)
TOTAL PROTEIN: 6.5 G/DL (ref 6.3–8.2)
WBC # BLD: 13.09 K/UL (ref 4.23–9.07)

## 2022-10-12 PROCEDURE — 96367 TX/PROPH/DG ADDL SEQ IV INF: CPT

## 2022-10-12 PROCEDURE — 2580000003 HC RX 258: Performed by: INTERNAL MEDICINE

## 2022-10-12 PROCEDURE — 99214 OFFICE O/P EST MOD 30 MIN: CPT | Performed by: INTERNAL MEDICINE

## 2022-10-12 PROCEDURE — 85025 COMPLETE CBC W/AUTO DIFF WBC: CPT

## 2022-10-12 PROCEDURE — 36415 COLL VENOUS BLD VENIPUNCTURE: CPT

## 2022-10-12 PROCEDURE — 80053 COMPREHEN METABOLIC PANEL: CPT

## 2022-10-12 PROCEDURE — 6360000002 HC RX W HCPCS: Performed by: INTERNAL MEDICINE

## 2022-10-12 PROCEDURE — 96413 CHEMO IV INFUSION 1 HR: CPT

## 2022-10-12 PROCEDURE — 1123F ACP DISCUSS/DSCN MKR DOCD: CPT | Performed by: INTERNAL MEDICINE

## 2022-10-12 RX ORDER — SODIUM CHLORIDE 9 MG/ML
INJECTION, SOLUTION INTRAVENOUS CONTINUOUS
Status: CANCELLED | OUTPATIENT
Start: 2022-10-13

## 2022-10-12 RX ORDER — SODIUM CHLORIDE 9 MG/ML
5-40 INJECTION INTRAVENOUS PRN
Status: CANCELLED | OUTPATIENT
Start: 2022-10-13

## 2022-10-12 RX ORDER — FUROSEMIDE 10 MG/ML
20 INJECTION INTRAMUSCULAR; INTRAVENOUS ONCE
Status: CANCELLED
Start: 2022-10-13 | End: 2022-10-13

## 2022-10-12 RX ORDER — ACETAMINOPHEN 325 MG/1
650 TABLET ORAL
Status: CANCELLED | OUTPATIENT
Start: 2022-10-13

## 2022-10-12 RX ORDER — SODIUM CHLORIDE 9 MG/ML
5-250 INJECTION, SOLUTION INTRAVENOUS PRN
Status: CANCELLED | OUTPATIENT
Start: 2022-10-13

## 2022-10-12 RX ORDER — SODIUM CHLORIDE 9 MG/ML
5-250 INJECTION, SOLUTION INTRAVENOUS PRN
Status: CANCELLED | OUTPATIENT
Start: 2022-10-12

## 2022-10-12 RX ORDER — ACETAMINOPHEN 325 MG/1
650 TABLET ORAL
Status: CANCELLED | OUTPATIENT
Start: 2022-10-12

## 2022-10-12 RX ORDER — MEPERIDINE HYDROCHLORIDE 50 MG/ML
12.5 INJECTION INTRAMUSCULAR; INTRAVENOUS; SUBCUTANEOUS PRN
Status: CANCELLED | OUTPATIENT
Start: 2022-10-13

## 2022-10-12 RX ORDER — HEPARIN SODIUM (PORCINE) LOCK FLUSH IV SOLN 100 UNIT/ML 100 UNIT/ML
500 SOLUTION INTRAVENOUS PRN
Status: CANCELLED | OUTPATIENT
Start: 2022-10-13

## 2022-10-12 RX ORDER — SODIUM CHLORIDE 9 MG/ML
5-40 INJECTION INTRAVENOUS PRN
Status: CANCELLED | OUTPATIENT
Start: 2022-10-12

## 2022-10-12 RX ORDER — DIPHENHYDRAMINE HYDROCHLORIDE 50 MG/ML
50 INJECTION INTRAMUSCULAR; INTRAVENOUS
Status: CANCELLED | OUTPATIENT
Start: 2022-10-13

## 2022-10-12 RX ORDER — SODIUM CHLORIDE 9 MG/ML
5-250 INJECTION, SOLUTION INTRAVENOUS PRN
Status: DISCONTINUED | OUTPATIENT
Start: 2022-10-12 | End: 2022-10-13 | Stop reason: HOSPADM

## 2022-10-12 RX ORDER — ALBUTEROL SULFATE 90 UG/1
4 AEROSOL, METERED RESPIRATORY (INHALATION) PRN
Status: CANCELLED | OUTPATIENT
Start: 2022-10-13

## 2022-10-12 RX ORDER — FAMOTIDINE 10 MG/ML
20 INJECTION, SOLUTION INTRAVENOUS
Status: CANCELLED | OUTPATIENT
Start: 2022-10-13

## 2022-10-12 RX ORDER — SODIUM CHLORIDE 0.9 % (FLUSH) 0.9 %
5-40 SYRINGE (ML) INJECTION PRN
Status: DISCONTINUED | OUTPATIENT
Start: 2022-10-12 | End: 2022-10-13 | Stop reason: HOSPADM

## 2022-10-12 RX ORDER — ONDANSETRON 2 MG/ML
8 INJECTION INTRAMUSCULAR; INTRAVENOUS
Status: CANCELLED | OUTPATIENT
Start: 2022-10-13

## 2022-10-12 RX ORDER — EPINEPHRINE 1 MG/ML
0.3 INJECTION, SOLUTION, CONCENTRATE INTRAVENOUS PRN
Status: CANCELLED | OUTPATIENT
Start: 2022-10-12

## 2022-10-12 RX ORDER — SODIUM CHLORIDE 0.9 % (FLUSH) 0.9 %
5-40 SYRINGE (ML) INJECTION PRN
Status: CANCELLED | OUTPATIENT
Start: 2022-10-13

## 2022-10-12 RX ORDER — SODIUM CHLORIDE 0.9 % (FLUSH) 0.9 %
5-40 SYRINGE (ML) INJECTION PRN
Status: CANCELLED | OUTPATIENT
Start: 2022-10-12

## 2022-10-12 RX ORDER — ALBUTEROL SULFATE 90 UG/1
4 AEROSOL, METERED RESPIRATORY (INHALATION) PRN
Status: CANCELLED | OUTPATIENT
Start: 2022-10-12

## 2022-10-12 RX ORDER — SODIUM CHLORIDE 9 MG/ML
INJECTION, SOLUTION INTRAVENOUS CONTINUOUS
Status: CANCELLED | OUTPATIENT
Start: 2022-10-12

## 2022-10-12 RX ORDER — ONDANSETRON 2 MG/ML
8 INJECTION INTRAMUSCULAR; INTRAVENOUS
Status: CANCELLED | OUTPATIENT
Start: 2022-10-12

## 2022-10-12 RX ORDER — HEPARIN SODIUM (PORCINE) LOCK FLUSH IV SOLN 100 UNIT/ML 100 UNIT/ML
500 SOLUTION INTRAVENOUS PRN
Status: DISCONTINUED | OUTPATIENT
Start: 2022-10-12 | End: 2022-10-13 | Stop reason: HOSPADM

## 2022-10-12 RX ORDER — HEPARIN SODIUM (PORCINE) LOCK FLUSH IV SOLN 100 UNIT/ML 100 UNIT/ML
500 SOLUTION INTRAVENOUS PRN
Status: CANCELLED | OUTPATIENT
Start: 2022-10-12

## 2022-10-12 RX ORDER — EPINEPHRINE 1 MG/ML
0.3 INJECTION, SOLUTION, CONCENTRATE INTRAVENOUS PRN
Status: CANCELLED | OUTPATIENT
Start: 2022-10-13

## 2022-10-12 RX ORDER — PALONOSETRON 0.05 MG/ML
0.25 INJECTION, SOLUTION INTRAVENOUS ONCE
Status: CANCELLED | OUTPATIENT
Start: 2022-10-13 | End: 2022-10-13

## 2022-10-12 RX ORDER — MEPERIDINE HYDROCHLORIDE 50 MG/ML
12.5 INJECTION INTRAMUSCULAR; INTRAVENOUS; SUBCUTANEOUS PRN
Status: CANCELLED | OUTPATIENT
Start: 2022-10-12

## 2022-10-12 RX ORDER — DIPHENHYDRAMINE HYDROCHLORIDE 50 MG/ML
50 INJECTION INTRAMUSCULAR; INTRAVENOUS
Status: CANCELLED | OUTPATIENT
Start: 2022-10-12

## 2022-10-12 RX ORDER — FAMOTIDINE 10 MG/ML
20 INJECTION, SOLUTION INTRAVENOUS
Status: CANCELLED | OUTPATIENT
Start: 2022-10-12

## 2022-10-12 RX ADMIN — Medication 500 UNITS: at 10:24

## 2022-10-12 RX ADMIN — METHOTREXATE 63.25 MG: 25 INJECTION, SOLUTION INTRA-ARTERIAL; INTRAMUSCULAR; INTRAVENOUS at 09:54

## 2022-10-12 RX ADMIN — SODIUM CHLORIDE, PRESERVATIVE FREE 10 ML: 5 INJECTION INTRAVENOUS at 10:24

## 2022-10-12 RX ADMIN — DEXAMETHASONE SODIUM PHOSPHATE 16 MG: 10 INJECTION, SOLUTION INTRAMUSCULAR; INTRAVENOUS at 09:29

## 2022-10-12 RX ADMIN — SODIUM CHLORIDE 100 ML/HR: 9 INJECTION, SOLUTION INTRAVENOUS at 09:28

## 2022-10-13 ENCOUNTER — HOSPITAL ENCOUNTER (OUTPATIENT)
Dept: INFUSION THERAPY | Age: 72
Discharge: HOME | End: 2022-10-13
Payer: MEDICARE

## 2022-10-13 VITALS
SYSTOLIC BLOOD PRESSURE: 150 MMHG | TEMPERATURE: 98.2 F | HEART RATE: 69 BPM | BODY MASS INDEX: 22.82 KG/M2 | OXYGEN SATURATION: 96 % | DIASTOLIC BLOOD PRESSURE: 76 MMHG | HEIGHT: 75 IN | RESPIRATION RATE: 18 BRPM

## 2022-10-13 DIAGNOSIS — C67.9 MALIGNANT NEOPLASM OF URINARY BLADDER, UNSPECIFIED SITE (HCC): ICD-10-CM

## 2022-10-13 DIAGNOSIS — C67.8 MALIGNANT NEOPLASM OF OVERLAPPING SITES OF BLADDER (HCC): Primary | ICD-10-CM

## 2022-10-13 PROCEDURE — 2580000003 HC RX 258: Performed by: INTERNAL MEDICINE

## 2022-10-13 PROCEDURE — 96413 CHEMO IV INFUSION 1 HR: CPT

## 2022-10-13 PROCEDURE — 96375 TX/PRO/DX INJ NEW DRUG ADDON: CPT

## 2022-10-13 PROCEDURE — 96377 APPLICATON ON-BODY INJECTOR: CPT

## 2022-10-13 PROCEDURE — 96411 CHEMO IV PUSH ADDL DRUG: CPT

## 2022-10-13 PROCEDURE — 6360000002 HC RX W HCPCS: Performed by: INTERNAL MEDICINE

## 2022-10-13 PROCEDURE — 96367 TX/PROPH/DG ADDL SEQ IV INF: CPT

## 2022-10-13 PROCEDURE — 96366 THER/PROPH/DIAG IV INF ADDON: CPT

## 2022-10-13 PROCEDURE — 96417 CHEMO IV INFUS EACH ADDL SEQ: CPT

## 2022-10-13 PROCEDURE — 96415 CHEMO IV INFUSION ADDL HR: CPT

## 2022-10-13 RX ORDER — SODIUM CHLORIDE 0.9 % (FLUSH) 0.9 %
5-40 SYRINGE (ML) INJECTION PRN
Status: DISCONTINUED | OUTPATIENT
Start: 2022-10-13 | End: 2022-10-14 | Stop reason: HOSPADM

## 2022-10-13 RX ORDER — PALONOSETRON 0.05 MG/ML
0.25 INJECTION, SOLUTION INTRAVENOUS ONCE
Status: COMPLETED | OUTPATIENT
Start: 2022-10-13 | End: 2022-10-13

## 2022-10-13 RX ORDER — FUROSEMIDE 10 MG/ML
20 INJECTION INTRAMUSCULAR; INTRAVENOUS ONCE
Status: COMPLETED | OUTPATIENT
Start: 2022-10-13 | End: 2022-10-13

## 2022-10-13 RX ORDER — HEPARIN SODIUM (PORCINE) LOCK FLUSH IV SOLN 100 UNIT/ML 100 UNIT/ML
500 SOLUTION INTRAVENOUS PRN
Status: DISCONTINUED | OUTPATIENT
Start: 2022-10-13 | End: 2022-10-14 | Stop reason: HOSPADM

## 2022-10-13 RX ORDER — SODIUM CHLORIDE 9 MG/ML
5-250 INJECTION, SOLUTION INTRAVENOUS PRN
Status: DISCONTINUED | OUTPATIENT
Start: 2022-10-13 | End: 2022-10-14 | Stop reason: HOSPADM

## 2022-10-13 RX ADMIN — SODIUM CHLORIDE, PRESERVATIVE FREE 10 ML: 5 INJECTION INTRAVENOUS at 13:21

## 2022-10-13 RX ADMIN — PALONOSETRON HYDROCHLORIDE 0.25 MG: 0.25 INJECTION, SOLUTION INTRAVENOUS at 08:58

## 2022-10-13 RX ADMIN — SODIUM CHLORIDE 64 MG: 9 INJECTION, SOLUTION INTRAVENOUS at 10:05

## 2022-10-13 RX ADMIN — SODIUM CHLORIDE 150 MG: 900 INJECTION, SOLUTION INTRAVENOUS at 09:08

## 2022-10-13 RX ADMIN — DEXAMETHASONE SODIUM PHOSPHATE: 10 INJECTION, SOLUTION INTRAMUSCULAR; INTRAVENOUS at 08:59

## 2022-10-13 RX ADMIN — POTASSIUM CHLORIDE: 2 INJECTION, SOLUTION, CONCENTRATE INTRAVENOUS at 11:44

## 2022-10-13 RX ADMIN — POTASSIUM CHLORIDE: 2 INJECTION, SOLUTION, CONCENTRATE INTRAVENOUS at 10:36

## 2022-10-13 RX ADMIN — VINBLASTINE SULFATE 6 MG: 1 INJECTION INTRAVENOUS at 09:50

## 2022-10-13 RX ADMIN — Medication 500 UNITS: at 13:21

## 2022-10-13 RX ADMIN — PEGFILGRASTIM 6 MG: KIT SUBCUTANEOUS at 11:45

## 2022-10-13 RX ADMIN — FUROSEMIDE 20 MG: 10 INJECTION, SOLUTION INTRAMUSCULAR; INTRAVENOUS at 11:43

## 2022-10-13 RX ADMIN — SODIUM CHLORIDE 148 MG: 0.9 INJECTION, SOLUTION INTRAVENOUS at 10:37

## 2022-11-04 ENCOUNTER — HOSPITAL ENCOUNTER (OUTPATIENT)
Dept: CT IMAGING | Age: 72
Discharge: HOME | End: 2022-11-04
Payer: MEDICARE

## 2022-11-04 ENCOUNTER — HOSPITAL ENCOUNTER (OUTPATIENT)
Dept: INFUSION THERAPY | Age: 72
Discharge: HOME | End: 2022-11-04
Payer: MEDICARE

## 2022-11-04 DIAGNOSIS — Z45.2 ENCOUNTER FOR CARE RELATED TO PORT-A-CATH: ICD-10-CM

## 2022-11-04 DIAGNOSIS — C67.8 MALIGNANT NEOPLASM OF OVERLAPPING SITES OF BLADDER (HCC): ICD-10-CM

## 2022-11-04 DIAGNOSIS — Z95.828 PORT-A-CATH IN PLACE: Primary | ICD-10-CM

## 2022-11-04 PROCEDURE — 6360000002 HC RX W HCPCS: Performed by: INTERNAL MEDICINE

## 2022-11-04 PROCEDURE — 96523 IRRIG DRUG DELIVERY DEVICE: CPT

## 2022-11-04 PROCEDURE — 2580000003 HC RX 258: Performed by: INTERNAL MEDICINE

## 2022-11-04 PROCEDURE — 6360000004 HC RX CONTRAST MEDICATION: Performed by: INTERNAL MEDICINE

## 2022-11-04 PROCEDURE — 74178 CT ABD&PLV WO CNTR FLWD CNTR: CPT | Performed by: INTERNAL MEDICINE

## 2022-11-04 PROCEDURE — 71260 CT THORAX DX C+: CPT

## 2022-11-04 RX ORDER — HEPARIN SODIUM (PORCINE) LOCK FLUSH IV SOLN 100 UNIT/ML 100 UNIT/ML
500 SOLUTION INTRAVENOUS PRN
OUTPATIENT
Start: 2022-11-04

## 2022-11-04 RX ORDER — SODIUM CHLORIDE 9 MG/ML
25 INJECTION, SOLUTION INTRAVENOUS PRN
OUTPATIENT
Start: 2022-11-04

## 2022-11-04 RX ORDER — SODIUM CHLORIDE 0.9 % (FLUSH) 0.9 %
5-40 SYRINGE (ML) INJECTION PRN
Status: DISCONTINUED | OUTPATIENT
Start: 2022-11-04 | End: 2022-11-05 | Stop reason: HOSPADM

## 2022-11-04 RX ORDER — SODIUM CHLORIDE 0.9 % (FLUSH) 0.9 %
5-40 SYRINGE (ML) INJECTION PRN
OUTPATIENT
Start: 2022-11-04

## 2022-11-04 RX ORDER — HEPARIN SODIUM (PORCINE) LOCK FLUSH IV SOLN 100 UNIT/ML 100 UNIT/ML
500 SOLUTION INTRAVENOUS PRN
Status: DISCONTINUED | OUTPATIENT
Start: 2022-11-04 | End: 2022-11-05 | Stop reason: HOSPADM

## 2022-11-04 RX ADMIN — HEPARIN 500 UNITS: 100 SYRINGE at 09:03

## 2022-11-04 RX ADMIN — IOPAMIDOL 75 ML: 755 INJECTION, SOLUTION INTRAVENOUS at 09:17

## 2022-11-04 RX ADMIN — SODIUM CHLORIDE, PRESERVATIVE FREE 10 ML: 5 INJECTION INTRAVENOUS at 09:03

## 2022-11-08 NOTE — PROGRESS NOTES
MEDICAL ONCOLOGY PROGRESS NOTE    Pt Name: Fozia Hameed  MRN: [id number]  YOB: 1950  Date of evaluation: 11/9/2022    HISTORY OF PRESENT ILLNESS:    Reason for MD visit-toxicity assessment/disease management. The patient is currently receiving neoadjuvant chemotherapy with dose dense MVAC for his diagnosis of muscle invasive bladder cancer. He has now completed 4 cycles of dose dense MVAC. He is a scheduled for cystoprostatectomy at [facility] on 12/15/2022. He is still struggling with significant fatigue postchemotherapy. He had repeat CT chest abdomen pelvis to assess disease status. No evidence of recurrent disease. Findings of 3 mm left upper lobe nodule. Attention to follow-up. Diagnosis  Muscle invasive high grade urothelial carcinoma, bladder, June 2022  Stage II, [id number]    Treatment Summary  6/29/22- TURBT by Dr Cyndi Farooq  8/31/22-10/13/2022-completion of 4 cycles neoadjuvant chemotherapy dose dense MVAC with G-CSF support  Anticipating cystoprostatectomy with Dr. Rajendra Luis at [address] [place] [address] Megan was first seen by me on 8/10/2022. He was referred by urology, [facility] for diagnosis of muscle invasive bladder cancer. Patient presented with complaints of hematuria in June 2022. Imaging studies showed sessile mass involving the right side of his bladder. No pelvic respiratory adenopathy. No evidence of distant metastasis. Cystoscopy was performed for transurethral resection of bladder tumor. Biopsy consistent with muscle invasive disease. Therefore, he was referred to me and also urology at [facility]. The patient denies any major comorbidities. He is quite functional.  Patient is a current smoker. 5/6/22 CT abd/pelvis: Sessile appearing mass in the base of the bladder on the right, measuring approximately 3.7 x 2.7 cm, this is compatible with bladder neoplasm until proven otherwise.  Consider follow-up with direct visualization and biopsy. The mass partially covers the right ureteral orifice. There is no urinary tract obstruction and the kidneys and ureters appear unremarkable except for a 2 mm nonobstructing stone in the right mid kidney. No intra-abdominal or pelvic lymphadenopathy is identified. Coarse calcifications within the pancreas compatible chronic pancreatitis. Chronic bilateral sacroiliitis. Chronic-appearing superior endplate compression fracture of L1 with associated Schmorl's node defect. No definite osseous metastases. 6/29/22 Cystoscopy/TURBT by Dr Malik Santillan:  Normal left retrograde pyelogram.  Right ureteral orifice adjacent to bladder tumor which was lateral.  Did not have to resect the orifice. Right retrograde showed narrowing at the UVJ and some mild ureteral dilation no filling defect. Right ureteral stent placed after bladder tumor resection 6 German by 20 cm. Will remain in for 6 weeks. Papillary bladder tumor multiple overlapping sites at the bladder neck at 630 and smaller papillary tumor at 12:00, between the bladder neck and trigone, mid trigone, and large greater than 5 cm solid tumor involving the right trigone laterally and right lateral wall. This was completely resected down to visible muscle. Possible gross muscle invasion  6/29/22 Bladder tumor, transurethral resection: Invasive high-grade urothelial carcinoma. The tumor invades the muscularis propria. 7/20/22 CT CHEST W CONTRAST  No acute chest pathology. Paraseptal emphysema in both upper lobes and lower lobes. Centrilobular emphysematous changes in bilateral upper lobes. Other nonacute findings above. Comparison: Radiographs of chest dated 06/23/2022.   7/20/22 CT ABDOMEN PELVIS W IV CONTRAST Postop changes in the urinary bladder as described above. Status post right sided double J ureteral stents placement. Evidence of prior granulomatous disease 4.  Superior and play compression fracture at L1 with 50% loss of vertebral body height.     7/27/2022 BLADDER, TRANSURETHRAL RESECTION ([facility]): Invasive high-grade papillary urothelial carcinoma. Tumor is invasive into the muscularis propria.  08/10/22- he was first seen by me. Discussed NCCN guidelines. Recommend consultation [place] with urology. Recommend neoadjuvant chemotherapy dose dense MVAC x4 cycles with G-CSF support. 8/15/2022-the patient was seen by Adair Cabrales, urology at [facility]. Plans for surgery after neoadjuvant chemotherapy. The patient will need repeat CT chest and CT urogram.  8/23/2022 2D Echocardiogram  Normal left ventricular size with preserved LV function and an estimated  ejection fraction of approximately 55-60%. Normal left ventricular wall thickness. No regional wall motion abnormalities. 8/26/2022 NM Bone Scan No activity is noted to suggest metastatic disease. 8/31/22 Initiated 4 cycles neoadjuvant chemotherapy dose dense MVAC with G-CSF support  9/9/2022 CXR Trace bibasilar atelectasis  10/13/2022-completion of 4 cycles neoadjuvant chemotherapy with dose dense MVAC. Patient will follow-up with CT scans and urology for surgery. 11/4/2022 CT Chest W Contrast Mild emphysematous change with a 3 mm calcified nodule in the left upper lobe. Atherosclerotic change. Interval placement of a Right-sided MediPort. No significant interval change. 11/4/2022 CT Urogram There has been interval removal of a right double-J ureteric stent. Mild right hydroureteronephrosis is recognized with the 2 mm stone in the proximal ureter. Previously recognized asymmetric urinary bladder wall thickening on the right cannot be properly evaluated due to lack of urinary bladder distention.     Past Medical History:    Past Medical History:   Diagnosis Date    Bladder cancer ([address] 06/2022    COPD (chronic obstructive pulmonary disease) (HCC)     Nocturia 06/27/2019    Positive colorectal cancer screening using Cologuard test 10/08/2019       Past Surgical History:    Past Surgical History:   Procedure Laterality Date    BLADDER TUMOR EXCISION      CATARACT REMOVAL Bilateral     COLONOSCOPY N/A 11/7/2019    Dr Sade Eddy-Tubulovillous AP, (-) dysplasia x 1, tubular AP, (-) dysplasia x 4, BCM x 1, 3 yr recall    CYSTOSCOPY N/A 6/29/2022    CYSTOSCOPY TRANSURETHRAL RESECTION BLADDER TUMOR GREATER THAN 5CM performed by Honorio Stubbs MD at [address] Bilateral 6/29/2022    BILATERAL URETERAL CATHETERIZATION BILATERAL RETROGRADE PYELOGRAM performed by Honorio Stubbs MD at [address] Right 6/29/2022    RIGHT URETERAL STENT INSERTION performed by Honorio Stubbs MD at [facility][note 3] Right     shoulder, ball and joint    HUMERUS FRACTURE SURGERY Right     LEG SURGERY Right     femur fracture    PORT SURGERY N/A 9/9/2022    [facility] Hema Henry DO [facility] [address] [facility]    shoulder fracture, ORIF, football injury       Social History:    Marital status:   Smoking status: Currently smoker-x40 years  ETOH status:  Resides: [place]    Family History:   Family History   Problem Relation Age of Onset    Diabetes Mother     Colon Polyps Mother     Cancer Father         Lung    Lung Cancer Father     No Known Problems Sister     Other Brother         disability with back    No Known Problems Maternal Grandmother     Heart Disease Maternal Grandfather     No Known Problems Paternal Grandmother     No Known Problems Paternal Grandfather     Pancreatic Cancer Daughter     Diabetes type 2  Daughter     Thyroid Disease Daughter     High Cholesterol Daughter     Colon Cancer Neg Hx     Esophageal Cancer Neg Hx     Liver Cancer Neg Hx     Liver Disease Neg Hx     Rectal Cancer Neg Hx     Stomach Cancer Neg Hx        Current Hospital Medications:    Current Outpatient Medications   Medication Sig Dispense Refill    Cyanocobalamin (VITAMIN B 12 PO) Take 4,000 mg by mouth once      Ondansetron HCl (ZOFRAN PO) Take by mouth       No current facility-administered medications for this visit. Allergies: No Known Allergies      Subjective   REVIEW OF SYSTEMS:   CONSTITUTIONAL: no fever, no night sweats, fatigue;  HEENT: no blurring of vision, no double vision, no hearing difficulty, no tinnitus, no ulceration, no dysplasia, no epistaxis;   LUNGS: no cough, no hemoptysis, no wheeze,  no shortness of breath;  CARDIOVASCULAR: no palpitation, no chest pain, no shortness of breath;  GI: no abdominal pain, no nausea, no vomiting, no diarrhea, mild constipation;  GIDEON: kidney pain,no dysuria, no hematuria, no frequency or urgency, no nephrolithiasis;  MUSCULOSKELETAL: no joint pain, no swelling, no stiffness;  ENDOCRINE: no polyuria, no polydipsia, no cold or heat intolerance;  HEMATOLOGY: no easy bruising or bleeding, no history of clotting disorder;  DERMATOLOGY: no skin rash, no eczema, no pruritus;  PSYCHIATRY: no depression, no anxiety, no panic attacks, no suicidal ideation, no homicidal ideation;  NEUROLOGY: no syncope, no seizures, no numbness or tingling of hands, no numbness or tingling of feet, no paresis;       Objective   BP (!) 140/78   Pulse 57   Ht 6' 3\" (1.905 m)   Wt 170 lb (77.1 kg)   SpO2 98%   BMI 21.25 kg/m²   Wt Readings from Last 3 Encounters:   11/09/22 170 lb (77.1 kg)   10/12/22 182 lb 9.6 oz (82.8 kg)   09/28/22 185 lb 4.8 oz (84.1 kg)       PHYSICAL EXAM:  CONSTITUTIONAL: Alert, appropriate, no acute distress  EYES: Non icteric, EOM intact, pupils equal round   ENT: Mucus membranes moist, no oral pharyngeal lesions, external inspection of ears and nose are normal  NECK: Supple, no masses. No palpable thyroid mass  CHEST/LUNGS: CTA bilaterally, normal respiratory effort   CARDIOVASCULAR: RRR, no murmurs. No lower extremity edema  ABDOMEN: soft non-tender, active bowel sounds, no HSM.   No palpable masses  EXTREMITIES: warm, full ROM in all 4 extremities, no focal weakness. SKIN: warm, dry with no rashes or lesions  LYMPH: No cervical, clavicular, axillary, or inguinal lymphadenopathy  NEUROLOGIC: follows commands, non focal   PSYCH: mood and affect appropriate. Alert and oriented to time, place, person      LABORATORY RESULTS REVIEWED/ANALYZED BY ME:  Lab Results   Component Value Date    WBC 5.61 11/09/2022    HGB 11.0 (L) 11/09/2022    HCT 33.8 (L) 11/09/2022    .7 (H) 11/09/2022     (L) 11/09/2022     Lab Results   Component Value Date    NEUTROABS 3.32 11/09/2022     RADIOLOGY STUDIES REVIEWED BY ME:  11/4/2022 CT Chest W Contrast Mild emphysematous change with a 3 mm calcified nodule in the left upper lobe. Atherosclerotic change. Interval placement of a Right-sided MediPort. No significant interval change. 11/4/2022 CT Urogram There has been interval removal of a right double-J ureteric stent. Mild right hydroureteronephrosis is recognized with the 2 mm stone in the proximal ureter. Previously recognized asymmetric urinary bladder wall thickening on the right cannot be properly evaluated due to lack of urinary bladder distention. ASSESSMENT:    No orders of the defined types were placed in this encounter. [place] was seen today for follow-up. Diagnoses and all orders for this visit:    Malignant neoplasm of overlapping sites of bladder [facility])    Care plan discussed with patient    Adverse effect of chemotherapy, subsequent encounter           [id number] muscle invasive bladder cancer, June 2022  Discussed NCCN guidelines. Recommend consultation [place] with urology. Recommend neoadjuvant chemotherapy dose dense MVAC x4 cycles with G-CSF support.  -Completed bone scan for staging that showed no evidence of metastatic disease. Patient has complaints of back pain  -8/15/2022-consultation urology at [address] [place] [address]. Plans for cystoprostatectomy after neoadjuvant chemotherapy.   10/13/22- Completion MVAC regimen   -CT C/A/P- no evidence of Progression  11/4/2022 CT Chest W Contrast Mild emphysematous change with a 3 mm calcified nodule in the left upper lobe. Atherosclerotic change. Interval placement of a Right-sided MediPort. No significant interval change. 11/4/2022 CT Urogram There has been interval removal of a right double-J ureteric stent. Mild right hydroureteronephrosis is recognized with the 2 mm stone in the proximal ureter. Previously recognized asymmetric urinary bladder wall thickening on the right cannot be properly evaluated due to lack of urinary bladder distention. Dec 15th,2022- Anticipate Surgery-OR Radical Cystoprostatectomy, Bilateral Pelvic Lymph Node Dissection with Ileal Conduit on 12/15/2022    Treatment related toxicity-fatigue. Denies any nausea. No vomiting. No diarrhea. Neutrophil leukocytosis-secondary to G-CSF  Resolved  Lab Results   Component Value Date    WBC 5.61 11/09/2022    HGB 11.0 (L) 11/09/2022    HCT 33.8 (L) 11/09/2022    .7 (H) 11/09/2022     (L) 11/09/2022   -Afebrile  -Continue to monitor    DENIS Pulmonary nodule, subcentimeter  -CT Chest  3mm DENIS nodule  Repeat CT chest in 4 months, around March 2023    PLAN:  RTC with MD Jan 2023  Repeat CT scan Chest March 2023  Reviewed from [address] [place] [address] recommendations  Proceed with Dr Adolfo Tellez Urology for surgery-SCHED 12/15/22       Abran DELEON am pre charting  as Medical Assistant for Sami Eugene MD. Electronically signed by Abran Hicks MA on 11/9/2022 at 5:28 PM CST. Gasper Ellison am scribing for Sami Eugene MD. Electronically signed by Meryl Blevins RN on 11/9/2022 at 12:01 PM CST. I, Dr Abi Guadarrama, personally performed the services described in this documentation as scribed by Meryl Blevins, LUZ MARIA in my presence and is both accurate and complete. I have seen, examined and reviewed this patient medication list, appropriate labs and imaging studies.  I reviewed relevant medical records and others physicians notes. I discussed the plans of care with the patient. I answered all the questions to the patients satisfaction. I have also reviewed the chief complaint (CC) and part of the history (History of Present Illness (HPI), Past Family Social History [facility]), or Review of Systems (ROS) and made changes when appropriated.        (Please note that portions of this note were completed with a voice recognition program. Efforts were made to edit the dictations but occasionally words are mis-transcribed.)  Electronically signed by Kamlesh Liang MD on 11/9/2022 at 12:08 PM

## 2022-11-09 ENCOUNTER — HOSPITAL ENCOUNTER (OUTPATIENT)
Dept: INFUSION THERAPY | Age: 72
Discharge: HOME | End: 2022-11-09
Payer: MEDICARE

## 2022-11-09 ENCOUNTER — OFFICE VISIT (OUTPATIENT)
Dept: HEMATOLOGY | Age: 72
End: 2022-11-09
Payer: MEDICARE

## 2022-11-09 VITALS
HEART RATE: 57 BPM | SYSTOLIC BLOOD PRESSURE: 140 MMHG | WEIGHT: 170 LBS | HEIGHT: 75 IN | DIASTOLIC BLOOD PRESSURE: 78 MMHG | OXYGEN SATURATION: 98 % | BODY MASS INDEX: 21.14 KG/M2

## 2022-11-09 DIAGNOSIS — Z71.89 CARE PLAN DISCUSSED WITH PATIENT: ICD-10-CM

## 2022-11-09 DIAGNOSIS — C67.8 MALIGNANT NEOPLASM OF OVERLAPPING SITES OF BLADDER (HCC): ICD-10-CM

## 2022-11-09 DIAGNOSIS — T45.1X5D ADVERSE EFFECT OF CHEMOTHERAPY, SUBSEQUENT ENCOUNTER: ICD-10-CM

## 2022-11-09 DIAGNOSIS — C67.8 MALIGNANT NEOPLASM OF OVERLAPPING SITES OF BLADDER (HCC): Primary | ICD-10-CM

## 2022-11-09 LAB
BASOPHILS ABSOLUTE: 0.09 K/UL (ref 0.01–0.08)
BASOPHILS RELATIVE PERCENT: 1.6 % (ref 0.1–1.2)
EOSINOPHILS ABSOLUTE: 0.11 K/UL (ref 0.04–0.54)
EOSINOPHILS RELATIVE PERCENT: 2 % (ref 0.7–7)
HCT VFR BLD CALC: 33.8 % (ref 40.1–51)
HEMOGLOBIN: 11 G/DL (ref 13.7–17.5)
LYMPHOCYTES ABSOLUTE: 1.01 K/UL (ref 1.18–3.74)
LYMPHOCYTES RELATIVE PERCENT: 18 % (ref 19.3–53.1)
MCH RBC QN AUTO: 33.4 PG (ref 25.7–32.2)
MCHC RBC AUTO-ENTMCNC: 32.5 G/DL (ref 32.3–36.5)
MCV RBC AUTO: 102.7 FL (ref 79–92.2)
MONOCYTES ABSOLUTE: 1.06 K/UL (ref 0.24–0.82)
MONOCYTES RELATIVE PERCENT: 18.9 % (ref 4.7–12.5)
NEUTROPHILS ABSOLUTE: 3.32 K/UL (ref 1.56–6.13)
NEUTROPHILS RELATIVE PERCENT: 59.1 % (ref 34–71.1)
PDW BLD-RTO: 19 % (ref 11.6–14.4)
PLATELET # BLD: 101 K/UL (ref 163–337)
PMV BLD AUTO: 12.2 FL (ref 7.4–10.4)
RBC # BLD: 3.29 M/UL (ref 4.63–6.08)
WBC # BLD: 5.61 K/UL (ref 4.23–9.07)

## 2022-11-09 PROCEDURE — 1123F ACP DISCUSS/DSCN MKR DOCD: CPT | Performed by: INTERNAL MEDICINE

## 2022-11-09 PROCEDURE — 36415 COLL VENOUS BLD VENIPUNCTURE: CPT

## 2022-11-09 PROCEDURE — 99214 OFFICE O/P EST MOD 30 MIN: CPT | Performed by: INTERNAL MEDICINE

## 2022-11-09 PROCEDURE — 85025 COMPLETE CBC W/AUTO DIFF WBC: CPT

## 2022-11-09 PROCEDURE — 99211 OFF/OP EST MAY X REQ PHY/QHP: CPT

## 2022-12-01 ENCOUNTER — HOSPITAL ENCOUNTER (OUTPATIENT)
Dept: INFUSION THERAPY | Age: 72
Discharge: HOME | End: 2022-12-01
Payer: MEDICARE

## 2022-12-01 DIAGNOSIS — C67.8 MALIGNANT NEOPLASM OF OVERLAPPING SITES OF BLADDER (HCC): ICD-10-CM

## 2022-12-01 LAB
BASOPHILS ABSOLUTE: 0.09 K/UL (ref 0.01–0.08)
BASOPHILS RELATIVE PERCENT: 1.5 % (ref 0.1–1.2)
EOSINOPHILS ABSOLUTE: 0.42 K/UL (ref 0.04–0.54)
EOSINOPHILS RELATIVE PERCENT: 7.1 % (ref 0.7–7)
HCT VFR BLD CALC: 37 % (ref 40.1–51)
HEMOGLOBIN: 11.9 G/DL (ref 13.7–17.5)
LYMPHOCYTES ABSOLUTE: 1.17 K/UL (ref 1.18–3.74)
LYMPHOCYTES RELATIVE PERCENT: 19.7 % (ref 19.3–53.1)
MCH RBC QN AUTO: 34.6 PG (ref 25.7–32.2)
MCHC RBC AUTO-ENTMCNC: 32.2 G/DL (ref 32.3–36.5)
MCV RBC AUTO: 107.6 FL (ref 79–92.2)
MONOCYTES ABSOLUTE: 0.62 K/UL (ref 0.24–0.82)
MONOCYTES RELATIVE PERCENT: 10.4 % (ref 4.7–12.5)
NEUTROPHILS ABSOLUTE: 3.62 K/UL (ref 1.56–6.13)
NEUTROPHILS RELATIVE PERCENT: 60.8 % (ref 34–71.1)
PDW BLD-RTO: 15.7 % (ref 11.6–14.4)
PLATELET # BLD: 88 K/UL (ref 163–337)
PMV BLD AUTO: 10.6 FL (ref 7.4–10.4)
RBC # BLD: 3.44 M/UL (ref 4.63–6.08)
WBC # BLD: 5.95 K/UL (ref 4.23–9.07)

## 2022-12-01 PROCEDURE — 85025 COMPLETE CBC W/AUTO DIFF WBC: CPT

## 2022-12-01 PROCEDURE — 36415 COLL VENOUS BLD VENIPUNCTURE: CPT

## 2023-01-10 NOTE — PROGRESS NOTES
MEDICAL ONCOLOGY PROGRESS NOTE    Pt Name: Kesha Renteria  MRN: [id number]  YOB: 1950  Date of evaluation: 1/11/2023    HISTORY OF PRESENT ILLNESS:    Reason for MD visit-toxicity assessment/disease management. The patient is currently receiving neoadjuvant chemotherapy with dose dense MVAC for his diagnosis of muscle invasive bladder cancer. He has now completed 4 cycles of dose dense MVAC. He is s/p cystoprostatectomy at [facility] on 12/15/2022 with a complete path response. He is here today to discuss pathology report and further recommendations. Diagnosis  Muscle invasive high grade urothelial carcinoma, bladder, June 2022  Stage II, [id number]    Treatment Summary  6/29/22- TURBT by Dr Karlos Talbot  8/31/22-10/13/2022-completion of 4 cycles neoadjuvant chemotherapy dose dense MVAC with G-CSF support  12/15/22 cystoprostatectomy with Dr. Sherry Ibarra at [address] [place] [address] was first seen by me on 8/10/2022. He was referred by urology, [facility] for diagnosis of muscle invasive bladder cancer. Patient presented with complaints of hematuria in June 2022. Imaging studies showed sessile mass involving the right side of his bladder. No pelvic respiratory adenopathy. No evidence of distant metastasis. Cystoscopy was performed for transurethral resection of bladder tumor. Biopsy consistent with muscle invasive disease. Therefore, he was referred to me and also urology at [address] [place] [address]. The patient denies any major comorbidities. He is quite functional.  Patient is a current smoker. 5/6/22 CT abd/pelvis: Sessile appearing mass in the base of the bladder on the right, measuring approximately 3.7 x 2.7 cm, this is compatible with bladder neoplasm until proven otherwise. Consider follow-up with direct visualization and biopsy. The mass partially covers the right ureteral orifice.  There is no urinary tract obstruction and the kidneys and ureters appear unremarkable except for a 2 mm nonobstructing stone in the right mid kidney. No intra-abdominal or pelvic lymphadenopathy is identified. Coarse calcifications within the pancreas compatible chronic pancreatitis. Chronic bilateral sacroiliitis. Chronic-appearing superior endplate compression fracture of L1 with associated Schmorl's node defect. No definite osseous metastases. 6/29/22 Cystoscopy/TURBT by Dr Gary Gillespie:  Normal left retrograde pyelogram.  Right ureteral orifice adjacent to bladder tumor which was lateral.  Did not have to resect the orifice. Right retrograde showed narrowing at the UVJ and some mild ureteral dilation no filling defect. Right ureteral stent placed after bladder tumor resection 6 [place] by 20 cm. Will remain in for 6 weeks. Papillary bladder tumor multiple overlapping sites at the bladder neck at 630 and smaller papillary tumor at 12:00, between the bladder neck and trigone, mid trigone, and large greater than 5 cm solid tumor involving the right trigone laterally and right lateral wall. This was completely resected down to visible muscle. Possible gross muscle invasion  6/29/22 Bladder tumor, transurethral resection: Invasive high-grade urothelial carcinoma. The tumor invades the muscularis propria. 7/20/22 CT CHEST W CONTRAST  No acute chest pathology. Paraseptal emphysema in both upper lobes and lower lobes. Centrilobular emphysematous changes in bilateral upper lobes. Other nonacute findings above. Comparison: Radiographs of chest dated 06/23/2022.   7/20/22 CT ABDOMEN PELVIS W IV CONTRAST Postop changes in the urinary bladder as described above. Status post right sided double J ureteral stents placement. Evidence of prior granulomatous disease 4. Superior and play compression fracture at L1 with 50% loss of vertebral body height.     7/27/2022 BLADDER, TRANSURETHRAL RESECTION ([place]): Invasive high-grade papillary urothelial carcinoma. Tumor is invasive into the muscularis propria.  08/10/22- he was first seen by me. Discussed NCCN guidelines. Recommend consultation [place] with urology. Recommend neoadjuvant chemotherapy dose dense MVAC x4 cycles with G-CSF support. 8/15/2022-the patient was seen by Antoinette Saini, urology at [facility]. Plans for surgery after neoadjuvant chemotherapy. The patient will need repeat CT chest and CT urogram.  8/23/2022 2D Echocardiogram  Normal left ventricular size with preserved LV function and an estimated  ejection fraction of approximately 55-60%. Normal left ventricular wall thickness. No regional wall motion abnormalities. 8/26/2022 NM Bone Scan No activity is noted to suggest metastatic disease. 8/31/22 Initiated 4 cycles neoadjuvant chemotherapy dose dense MVAC with G-CSF support  9/9/2022 CXR Trace bibasilar atelectasis  10/13/2022-completion of 4 cycles neoadjuvant chemotherapy with dose dense MVAC. Patient will follow-up with CT scans and urology for surgery. 11/4/2022 CT Chest W Contrast Mild emphysematous change with a 3 mm calcified nodule in the left upper lobe. Atherosclerotic change. Interval placement of a Right-sided MediPort. No significant interval change. 11/4/2022 CT Urogram There has been interval removal of a right double-J ureteric stent. Mild right hydroureteronephrosis is recognized with the 2 mm stone in the proximal ureter. Previously recognized asymmetric urinary bladder wall thickening on the right cannot be properly evaluated due to lack of urinary bladder distention. 12/15/22 cystoprostatectomy with Dr. Antoinette Saini at [facility]. Pathology showed complete pathologic response. 16 pelvic lymph nodes negative for metastatic disease. Final pathologic stage [id number]   1/11/2023-I reviewed pathology report. Patient had a complete pathologic response. Recommended follow-up as per NCCN guidelines.   No need for adjuvant Nivolumab  Past Medical History:    Past Medical History: Diagnosis Date    Bladder cancer ([facility] [address] 06/2022    COPD (chronic obstructive pulmonary disease) (HCC)     Nocturia 06/27/2019    Positive colorectal cancer screening using Cologuard test 10/08/2019       Past Surgical History:    Past Surgical History:   Procedure Laterality Date    BLADDER TUMOR EXCISION      CATARACT REMOVAL Bilateral     COLONOSCOPY N/A 11/7/2019    Dr Gage Eddy-Tubulovillous AP, (-) dysplasia x 1, tubular AP, (-) dysplasia x 4, BCM x 1, 3 yr recall    CYSTOSCOPY N/A 6/29/2022    CYSTOSCOPY TRANSURETHRAL RESECTION BLADDER TUMOR GREATER THAN 5CM performed by Bonne Felty, MD at [address] Bilateral 6/29/2022    BILATERAL URETERAL CATHETERIZATION BILATERAL RETROGRADE PYELOGRAM performed by Bonne Felty, MD at [address] Right 6/29/2022    RIGHT URETERAL STENT INSERTION performed by Bonne Felty, MD at [address] Right     shoulder, ball and joint    HUMERUS FRACTURE SURGERY Right     LEG SURGERY Right     femur fracture    PORT SURGERY N/A 9/9/2022    PORT INSERTION performed by Sandro Vega DO at [address] [address] Right 1965    shoulder fracture, ORIF, football injury       Social History:    Marital status:   Smoking status: Currently smoker-x40 years  ETOH status:  [facility]ides: [facility]    Family History:   Family History   Problem Relation Age of Onset    Diabetes Mother     Colon Polyps Mother     Cancer Father         Lung    Lung Cancer Father     No Known Problems Sister     Other Brother         disability with back    No Known Problems Maternal Grandmother     Heart Disease Maternal Grandfather     No Known Problems Paternal Grandmother     No Known Problems Paternal Grandfather     Pancreatic Cancer Daughter     Diabetes type 2  Daughter     Thyroid Disease Daughter     High Cholesterol Daughter     Colon Cancer Neg Hx     Esophageal Cancer Neg Hx     Liver Cancer Neg Hx     Liver Disease Neg Hx     Rectal Cancer Neg Hx     Stomach Cancer Neg Hx        Current Hospital Medications:    Current Outpatient Medications   Medication Sig Dispense Refill    apixaban (ELIQUIS) 2.5 MG TABS tablet Take 2.5 mg by mouth every 12 hours      levoFLOXacin (LEVAQUIN) 500 MG tablet Take 500 mg by mouth daily      sodium bicarbonate 325 MG tablet Take 325 mg by mouth 3 times daily      Cyanocobalamin (VITAMIN B 12 PO) Take 4,000 mg by mouth once      Ondansetron HCl (ZOFRAN PO) Take by mouth       No current facility-administered medications for this visit.      Facility-Administered Medications Ordered in Other Visits   Medication Dose Route Frequency Provider Last Rate Last Admin    sodium chloride flush 0.9 % injection 5-40 mL  5-40 mL IntraVENous PRN Mariano Muñoz MD        heparin flush 100 UNIT/ML injection 500 Units  500 Units IntraCATHeter PRN Mariano Muñoz MD           Allergies: No Known Allergies      Subjective   REVIEW OF SYSTEMS:   CONSTITUTIONAL: no fever, no night sweats, fatigue;  HEENT: no blurring of vision, no double vision, no hearing difficulty, no tinnitus, no ulceration, no dysplasia, no epistaxis;   LUNGS: no cough, no hemoptysis, no wheeze,  no shortness of breath;  CARDIOVASCULAR: no palpitation, no chest pain, no shortness of breath;  GI: no abdominal pain, no nausea, no vomiting, no diarrhea, no constipation;  GIDEON: no dysuria, no hematuria, no frequency or urgency, no nephrolithiasis;  MUSCULOSKELETAL: no joint pain, no swelling, no stiffness;  ENDOCRINE: no polyuria, no polydipsia, no cold or heat intolerance;  HEMATOLOGY: no easy bruising or bleeding, no history of clotting disorder;  DERMATOLOGY: no skin rash, no eczema, no pruritus;  PSYCHIATRY: no depression, no anxiety, no panic attacks, no suicidal ideation, no homicidal ideation;  NEUROLOGY: no syncope, no seizures, no numbness or tingling of hands, no numbness or tingling of feet, no paresis;       Objective   /68   Pulse 63   Temp 98 °F (36.7 °C) (Oral)   Ht 6' 3\" (1.905 m)   Wt 163 lb (73.9 kg)   SpO2 98%   BMI 20.37 kg/m²     PHYSICAL EXAM:  CONSTITUTIONAL: Alert, appropriate, no acute distress  EYES: Non icteric, EOM intact, pupils equal round   ENT: Mucus membranes moist, no oral pharyngeal lesions, external inspection of ears and nose are normal  NECK: Supple, no masses. No palpable thyroid mass  CHEST/LUNGS: CTA bilaterally, normal respiratory effort   CARDIOVASCULAR: RRR, no murmurs. No lower extremity edema  ABDOMEN: soft non-tender, active bowel sounds, no HSM. No palpable masses  EXTREMITIES: warm, full ROM in all 4 extremities, no focal weakness. SKIN: warm, dry with no rashes or lesions  LYMPH: No cervical, clavicular, axillary, or inguinal lymphadenopathy  NEUROLOGIC: follows commands, non focal   PSYCH: mood and affect appropriate.   Alert and oriented to time, place, person      LABORATORY RESULTS REVIEWED/ANALYZED BY ME:  Lab Results   Component Value Date    WBC 5.14 01/11/2023    HGB 11.6 (L) 01/11/2023    HCT 35.2 (L) 01/11/2023    .7 (H) 01/11/2023     (L) 01/11/2023     Lab Results   Component Value Date    NEUTROABS 3.09 01/11/2023       RADIOLOGY STUDIES REVIEWED BY ME:  None    ASSESSMENT:    Orders Placed This Encounter   Procedures    CT ABDOMEN PELVIS W IV CONTRAST Additional Contrast? Oral     Standing Status:   Future     Standing Expiration Date:   7/11/2024     Scheduling Instructions:      [facility] March 2023     Order Specific Question:   Additional Contrast?     Answer:   Oral     Order Specific Question:   STAT Creatinine as needed:     Answer:   Yes     Order Specific Question:   Reason for exam:     Answer:   Compare previous scans for restaging following surgery    CT CHEST W CONTRAST     Standing Status:   Future     Standing Expiration Date:   7/11/2024     Scheduling Instructions:      [facility] March 2023     Order Specific Question:   STAT Creatinine as needed:     Answer:   Yes     Order Specific Question:   Reason for exam:     Answer:   Compare previous scans for restaging following surgery    Comprehensive Metabolic Panel     Standing Status:   Future     Standing Expiration Date:   1/11/2024    Vitamin B12     Standing Status:   Future     Standing Expiration Date:   1/11/2024              Teresa Ricardo was seen today for follow-up. Diagnoses and all orders for this visit:    Malignant neoplasm of overlapping sites of bladder ([address]  -     CT ABDOMEN PELVIS W IV CONTRAST Additional Contrast? Oral; Future  -     CT CHEST W CONTRAST; Future  -     Comprehensive Metabolic Panel; Future    Care plan discussed with patient    Antineoplastic chemotherapy induced anemia  -     Vitamin B12; Future             [id number] MIBC, June 2022  Dec 15th,2022- Anticipate Surgery-OR Radical Cystoprostatectomy, Bilateral Pelvic Lymph Node Dissection with Ileal Conduit on 12/15/2022      [place] follow-up recommendations:  CT abd/pelvis every 3-6 months for 2 years, then annually to year 5, then u/s annually to year 8;  - Chest imaging with CXR or CT every 3-6 months for 2 years, then annually to year 5; next chest imaging for this patient  - Blood and urine studies: BMP/CMP +/- CBC at each visit in year 1, then annually to year 5. Vitamin B12 level annually in year 2-5;    DENIS Pulmonary nodule, subcentimeter  -CT Chest  3mm DENIS nodule  Repeat CT chest in 4 months, around March 2023    PLAN:  RTC with MD in March after scans  CBC CMP B12 today  Repeat CT scan Chest, Abdomen, Pelvis March 2023  Port flush today and every 8 weeks      IAbran am pre charting  as Medical Assistant for Sami Eugene MD. Electronically signed by Abran Hicks MA on 1/11/2023 at 10:08 AM CST. Stephanie Huang am scribing for Sami Eugene MD. Electronically signed by Nael Reynolds RN on 1/11/2023 at 11:48 AM CST.     I, Dr Abi Guadarrama, personally performed the services described in this documentation as scribed by Chris Bolden RN in my presence and is both accurate and complete. I have seen, examined and reviewed this patient medication list, appropriate labs and imaging studies. I reviewed relevant medical records and others physicians notes. I discussed the plans of care with the patient. I answered all the questions to the patients satisfaction. I have also reviewed the chief complaint (CC) and part of the history (History of Present Illness (HPI), Past Family Social History [facility]), or Review of Systems (ROS) and made changes when appropriated. (Please note that portions of this note were completed with a voice recognition program. Efforts were made to edit the dictations but occasionally words are mis-transcribed. )Electronically signed by Janae Hernandez MD on 1/11/2023 at 12:16 PM

## 2023-01-11 ENCOUNTER — HOSPITAL ENCOUNTER (OUTPATIENT)
Dept: INFUSION THERAPY | Age: 73
Discharge: HOME | End: 2023-01-11
Payer: MEDICARE

## 2023-01-11 ENCOUNTER — OFFICE VISIT (OUTPATIENT)
Dept: HEMATOLOGY | Age: 73
End: 2023-01-11

## 2023-01-11 VITALS
DIASTOLIC BLOOD PRESSURE: 68 MMHG | HEIGHT: 75 IN | SYSTOLIC BLOOD PRESSURE: 114 MMHG | OXYGEN SATURATION: 98 % | TEMPERATURE: 98 F | HEART RATE: 63 BPM | WEIGHT: 163 LBS | BODY MASS INDEX: 20.27 KG/M2

## 2023-01-11 DIAGNOSIS — D64.81 ANTINEOPLASTIC CHEMOTHERAPY INDUCED ANEMIA: ICD-10-CM

## 2023-01-11 DIAGNOSIS — T45.1X5A ANTINEOPLASTIC CHEMOTHERAPY INDUCED ANEMIA: ICD-10-CM

## 2023-01-11 DIAGNOSIS — Z71.89 CARE PLAN DISCUSSED WITH PATIENT: ICD-10-CM

## 2023-01-11 DIAGNOSIS — Z95.828 PORT-A-CATH IN PLACE: ICD-10-CM

## 2023-01-11 DIAGNOSIS — C67.8 MALIGNANT NEOPLASM OF OVERLAPPING SITES OF BLADDER (HCC): Primary | ICD-10-CM

## 2023-01-11 DIAGNOSIS — Z45.2 ENCOUNTER FOR CARE RELATED TO PORT-A-CATH: ICD-10-CM

## 2023-01-11 DIAGNOSIS — C67.8 MALIGNANT NEOPLASM OF OVERLAPPING SITES OF BLADDER (HCC): ICD-10-CM

## 2023-01-11 LAB
ALBUMIN SERPL-MCNC: 4.6 G/DL (ref 3.5–5.2)
ALP BLD-CCNC: 72 U/L (ref 40–130)
ALT SERPL-CCNC: 12 U/L (ref 5–41)
ANION GAP SERPL CALCULATED.3IONS-SCNC: 13 MMOL/L (ref 7–19)
AST SERPL-CCNC: 16 U/L (ref 5–40)
BASOPHILS ABSOLUTE: 0.05 K/UL (ref 0.01–0.08)
BASOPHILS RELATIVE PERCENT: 1 % (ref 0.1–1.2)
BILIRUB SERPL-MCNC: 0.3 MG/DL (ref 0.2–1.2)
BUN BLDV-MCNC: 21 MG/DL (ref 8–23)
CALCIUM SERPL-MCNC: 9.7 MG/DL (ref 8.8–10.2)
CHLORIDE BLD-SCNC: 101 MMOL/L (ref 98–111)
CO2: 25 MMOL/L (ref 22–29)
CREAT SERPL-MCNC: 0.8 MG/DL (ref 0.5–1.2)
EOSINOPHILS ABSOLUTE: 0.21 K/UL (ref 0.04–0.54)
EOSINOPHILS RELATIVE PERCENT: 4.1 % (ref 0.7–7)
GFR SERPL CREATININE-BSD FRML MDRD: >60 ML/MIN/{1.73_M2}
GLUCOSE BLD-MCNC: 82 MG/DL (ref 74–109)
HCT VFR BLD CALC: 35.2 % (ref 40.1–51)
HEMOGLOBIN: 11.6 G/DL (ref 13.7–17.5)
LYMPHOCYTES ABSOLUTE: 1.14 K/UL (ref 1.18–3.74)
LYMPHOCYTES RELATIVE PERCENT: 22.2 % (ref 19.3–53.1)
MCH RBC QN AUTO: 34.8 PG (ref 25.7–32.2)
MCHC RBC AUTO-ENTMCNC: 33 G/DL (ref 32.3–36.5)
MCV RBC AUTO: 105.7 FL (ref 79–92.2)
MONOCYTES ABSOLUTE: 0.64 K/UL (ref 0.24–0.82)
MONOCYTES RELATIVE PERCENT: 12.5 % (ref 4.7–12.5)
NEUTROPHILS ABSOLUTE: 3.09 K/UL (ref 1.56–6.13)
NEUTROPHILS RELATIVE PERCENT: 60 % (ref 34–71.1)
PDW BLD-RTO: 12 % (ref 11.6–14.4)
PLATELET # BLD: 122 K/UL (ref 163–337)
PMV BLD AUTO: 11 FL (ref 7.4–10.4)
POTASSIUM SERPL-SCNC: 4.5 MMOL/L (ref 3.5–5)
RBC # BLD: 3.33 M/UL (ref 4.63–6.08)
SODIUM BLD-SCNC: 139 MMOL/L (ref 136–145)
TOTAL PROTEIN: 7.1 G/DL (ref 6.6–8.7)
VITAMIN B-12: 215 PG/ML (ref 211–946)
WBC # BLD: 5.14 K/UL (ref 4.23–9.07)

## 2023-01-11 PROCEDURE — 36415 COLL VENOUS BLD VENIPUNCTURE: CPT | Performed by: INTERNAL MEDICINE

## 2023-01-11 PROCEDURE — 6360000002 HC RX W HCPCS: Performed by: INTERNAL MEDICINE

## 2023-01-11 PROCEDURE — 99212 OFFICE O/P EST SF 10 MIN: CPT

## 2023-01-11 PROCEDURE — 85025 COMPLETE CBC W/AUTO DIFF WBC: CPT

## 2023-01-11 PROCEDURE — 36415 COLL VENOUS BLD VENIPUNCTURE: CPT

## 2023-01-11 PROCEDURE — 96523 IRRIG DRUG DELIVERY DEVICE: CPT

## 2023-01-11 PROCEDURE — 2580000003 HC RX 258: Performed by: INTERNAL MEDICINE

## 2023-01-11 RX ORDER — SODIUM CHLORIDE 0.9 % (FLUSH) 0.9 %
5-40 SYRINGE (ML) INJECTION PRN
Status: DISCONTINUED | OUTPATIENT
Start: 2023-01-11 | End: 2023-01-12 | Stop reason: HOSPADM

## 2023-01-11 RX ORDER — LEVOFLOXACIN 500 MG/1
500 TABLET, FILM COATED ORAL DAILY
COMMUNITY
Start: 2022-12-29

## 2023-01-11 RX ORDER — SODIUM BICARBONATE 325 MG/1
325 TABLET ORAL 3 TIMES DAILY
COMMUNITY
Start: 2022-12-19 | End: 2023-01-19

## 2023-01-11 RX ORDER — HEPARIN SODIUM (PORCINE) LOCK FLUSH IV SOLN 100 UNIT/ML 100 UNIT/ML
500 SOLUTION INTRAVENOUS PRN
Status: DISCONTINUED | OUTPATIENT
Start: 2023-01-11 | End: 2023-01-12 | Stop reason: HOSPADM

## 2023-01-11 RX ORDER — SODIUM CHLORIDE 0.9 % (FLUSH) 0.9 %
5-40 SYRINGE (ML) INJECTION PRN
OUTPATIENT
Start: 2023-01-11

## 2023-01-11 RX ORDER — HEPARIN SODIUM (PORCINE) LOCK FLUSH IV SOLN 100 UNIT/ML 100 UNIT/ML
500 SOLUTION INTRAVENOUS PRN
OUTPATIENT
Start: 2023-01-11

## 2023-01-11 RX ORDER — SODIUM CHLORIDE 9 MG/ML
25 INJECTION, SOLUTION INTRAVENOUS PRN
OUTPATIENT
Start: 2023-01-11

## 2023-01-11 RX ADMIN — Medication 10 ML: at 13:00

## 2023-01-11 RX ADMIN — HEPARIN 500 UNITS: 100 SYRINGE at 13:00

## 2023-02-08 ENCOUNTER — HOSPITAL ENCOUNTER (OUTPATIENT)
Dept: CT IMAGING | Age: 73
Discharge: HOME | End: 2023-02-08
Payer: MEDICARE

## 2023-02-08 ENCOUNTER — HOSPITAL ENCOUNTER (OUTPATIENT)
Dept: INFUSION THERAPY | Age: 73
Discharge: HOME | End: 2023-02-08
Payer: MEDICARE

## 2023-02-08 DIAGNOSIS — Z45.2 ENCOUNTER FOR CARE RELATED TO PORT-A-CATH: ICD-10-CM

## 2023-02-08 DIAGNOSIS — C67.8 MALIGNANT NEOPLASM OF OVERLAPPING SITES OF BLADDER (HCC): ICD-10-CM

## 2023-02-08 DIAGNOSIS — Z95.828 PORT-A-CATH IN PLACE: Primary | ICD-10-CM

## 2023-02-08 PROCEDURE — 2580000003 HC RX 258: Performed by: INTERNAL MEDICINE

## 2023-02-08 PROCEDURE — 74177 CT ABD & PELVIS W/CONTRAST: CPT

## 2023-02-08 PROCEDURE — 71260 CT THORAX DX C+: CPT

## 2023-02-08 PROCEDURE — 6360000004 HC RX CONTRAST MEDICATION: Performed by: INTERNAL MEDICINE

## 2023-02-08 PROCEDURE — 6360000002 HC RX W HCPCS: Performed by: INTERNAL MEDICINE

## 2023-02-08 PROCEDURE — 96523 IRRIG DRUG DELIVERY DEVICE: CPT

## 2023-02-08 RX ORDER — HEPARIN SODIUM (PORCINE) LOCK FLUSH IV SOLN 100 UNIT/ML 100 UNIT/ML
500 SOLUTION INTRAVENOUS PRN
OUTPATIENT
Start: 2023-02-08

## 2023-02-08 RX ORDER — SODIUM CHLORIDE 0.9 % (FLUSH) 0.9 %
5-40 SYRINGE (ML) INJECTION PRN
OUTPATIENT
Start: 2023-02-08

## 2023-02-08 RX ORDER — SODIUM CHLORIDE 9 MG/ML
25 INJECTION, SOLUTION INTRAVENOUS PRN
OUTPATIENT
Start: 2023-02-08

## 2023-02-08 RX ORDER — SODIUM CHLORIDE 0.9 % (FLUSH) 0.9 %
5-40 SYRINGE (ML) INJECTION PRN
Status: DISCONTINUED | OUTPATIENT
Start: 2023-02-08 | End: 2023-02-09 | Stop reason: HOSPADM

## 2023-02-08 RX ORDER — HEPARIN SODIUM (PORCINE) LOCK FLUSH IV SOLN 100 UNIT/ML 100 UNIT/ML
500 SOLUTION INTRAVENOUS PRN
Status: DISCONTINUED | OUTPATIENT
Start: 2023-02-08 | End: 2023-02-09 | Stop reason: HOSPADM

## 2023-02-08 RX ADMIN — IOPAMIDOL 70 ML: 755 INJECTION, SOLUTION INTRAVENOUS at 09:13

## 2023-02-08 RX ADMIN — SODIUM CHLORIDE, PRESERVATIVE FREE 10 ML: 5 INJECTION INTRAVENOUS at 09:12

## 2023-02-08 RX ADMIN — HEPARIN 500 UNITS: 100 SYRINGE at 09:12

## 2023-03-13 NOTE — PROGRESS NOTES
MEDICAL ONCOLOGY PROGRESS NOTE    Pt Name: Denise Berman  MRN: [id number]  YOB: 1950  Date of evaluation: 3/15/2023    HISTORY OF PRESENT ILLNESS:    Reason for MD visit-toxicity assessment/disease management. The patient is currently receiving neoadjuvant chemotherapy with dose dense MVAC for his diagnosis of muscle invasive bladder cancer. He has now completed 4 cycles of dose dense MVAC. He is s/p cystoprostatectomy at [facility] on 12/15/2022 with a complete path response. He had CT scans for surveillance performed. He denies any new complaints. Diagnosis  Muscle invasive high grade urothelial carcinoma, bladder, June 2022  Stage II, [id number]    Treatment Summary  6/29/22- TURBT by Dr Kristina Quispe  8/31/22-10/13/2022-completion of 4 cycles neoadjuvant chemotherapy dose dense MVAC with G-CSF support  12/15/22 cystoprostatectomy with Dr. Sapna Bains at [address] was first seen by me on 8/10/2022. He was referred by urology, [facility] for diagnosis of muscle invasive bladder cancer. Patient presented with complaints of hematuria in June 2022. Imaging studies showed sessile mass involving the right side of his bladder. No pelvic respiratory adenopathy. No evidence of distant metastasis. Cystoscopy was performed for transurethral resection of bladder tumor. Biopsy consistent with muscle invasive disease. Therefore, he was referred to me and also urology at [facility]. The patient denies any major comorbidities. He is quite functional.  Patient is a current smoker. 5/6/22 CT abd/pelvis: Sessile appearing mass in the base of the bladder on the right, measuring approximately 3.7 x 2.7 cm, this is compatible with bladder neoplasm until proven otherwise. Consider follow-up with direct visualization and biopsy. The mass partially covers the right ureteral orifice.  There is no urinary tract obstruction and the kidneys and ureters appear unremarkable except for a 2 mm nonobstructing stone in the right mid kidney. No intra-abdominal or pelvic lymphadenopathy is identified. Coarse calcifications within the pancreas compatible chronic pancreatitis. Chronic bilateral sacroiliitis. Chronic-appearing superior endplate compression fracture of L1 with associated Schmorl's node defect. No definite osseous metastases. 6/29/22 Cystoscopy/TURBT by Dr Cyndi Farooq:  Normal left retrograde pyelogram.  Right ureteral orifice adjacent to bladder tumor which was lateral.  Did not have to resect the orifice. Right retrograde showed narrowing at the UVJ and some mild ureteral dilation no filling defect. Right ureteral stent placed after bladder tumor resection 6 [place] by 20 cm. Will remain in for 6 weeks. Papillary bladder tumor multiple overlapping sites at the bladder neck at 630 and smaller papillary tumor at 12:00, between the bladder neck and trigone, mid trigone, and large greater than 5 cm solid tumor involving the right trigone laterally and right lateral wall. This was completely resected down to visible muscle. Possible gross muscle invasion  6/29/22 Bladder tumor, transurethral resection: Invasive high-grade urothelial carcinoma. The tumor invades the muscularis propria. 7/20/22 CT CHEST W CONTRAST  No acute chest pathology. Paraseptal emphysema in both upper lobes and lower lobes. Centrilobular emphysematous changes in bilateral upper lobes. Other nonacute findings above. Comparison: Radiographs of chest dated 06/23/2022.   7/20/22 CT ABDOMEN PELVIS W IV CONTRAST Postop changes in the urinary bladder as described above. Status post right sided double J ureteral stents placement. Evidence of prior granulomatous disease 4. Superior and play compression fracture at L1 with 50% loss of vertebral body height.     7/27/2022 BLADDER, TRANSURETHRAL RESECTION ([place]): Invasive high-grade papillary urothelial carcinoma. Tumor is invasive into the muscularis propria.  08/10/22- he was first seen by me. Discussed NCCN guidelines. Recommend consultation [place] with urology. Recommend neoadjuvant chemotherapy dose dense MVAC x4 cycles with G-CSF support. 8/15/2022-the patient was seen by Sandra Curran, urology at [facility]. Plans for surgery after neoadjuvant chemotherapy. The patient will need repeat CT chest and CT urogram.  8/23/2022 2D Echocardiogram  Normal left ventricular size with preserved LV function and an estimated  ejection fraction of approximately 55-60%. Normal left ventricular wall thickness. No regional wall motion abnormalities. 8/26/2022 NM Bone Scan No activity is noted to suggest metastatic disease. 8/31/22 Initiated 4 cycles neoadjuvant chemotherapy dose dense MVAC with G-CSF support  9/9/2022 CXR Trace bibasilar atelectasis  10/13/2022-completion of 4 cycles neoadjuvant chemotherapy with dose dense MVAC. Patient will follow-up with CT scans and urology for surgery. 11/4/2022 CT Chest W Contrast Mild emphysematous change with a 3 mm calcified nodule in the left upper lobe. Atherosclerotic change. Interval placement of a Right-sided MediPort. No significant interval change. 11/4/2022 CT Urogram There has been interval removal of a right double-J ureteric stent. Mild right hydroureteronephrosis is recognized with the 2 mm stone in the proximal ureter. Previously recognized asymmetric urinary bladder wall thickening on the right cannot be properly evaluated due to lack of urinary bladder distention. 12/15/22 cystoprostatectomy with Dr. Sandra Curran at [facility]. Pathology showed complete pathologic response. 16 pelvic lymph nodes negative for metastatic disease. Final pathologic stage [id number]   1/11/2023-I reviewed pathology report. Patient had a complete pathologic response. Recommended follow-up as per NCCN guidelines.   No need for adjuvant Nivolumab  2/8/23 CT Chest, Abdomen, Pelvis W IV Contrast No acute abnormality or metastatic disease identified within the chest,abdomen, or pelvis. Status post cystectomy with bilateral ureteral diversion and RIGHT lower quadrant urostomy. No hydronephrosis is seen. Pulmonary emphysema. Evidence of prior granulomatous disease.                  Past Medical History:    Past Medical History:   Diagnosis Date    Bladder cancer (HCC) 06/2022    COPD (chronic obstructive pulmonary disease) (HCC)     Nocturia 06/27/2019    Positive colorectal cancer screening using Cologuard test 10/08/2019       Past Surgical History:    Past Surgical History:   Procedure Laterality Date    BLADDER TUMOR EXCISION      CATARACT REMOVAL Bilateral     COLONOSCOPY N/A 11/7/2019    Dr DAYANA Eddy-Tubulovillous AP, (-) dysplasia x 1, tubular AP, (-) dysplasia x 4, BCM x 1, 3 yr recall    CYSTOSCOPY N/A 6/29/2022    CYSTOSCOPY TRANSURETHRAL RESECTION BLADDER TUMOR GREATER THAN 5CM performed by David King MD at [facility] OR    CYSTOSCOPY Bilateral 6/29/2022    BILATERAL URETERAL CATHETERIZATION BILATERAL RETROGRADE PYELOGRAM performed by David King MD at [facility] OR    CYSTOSCOPY Right 6/29/2022    RIGHT URETERAL STENT INSERTION performed by David King MD at [facility] OR    FRACTURE SURGERY Right     shoulder, ball and joint    HUMERUS FRACTURE SURGERY Right     LEG SURGERY Right     femur fracture    PORT SURGERY N/A 9/9/2022    PORT INSERTION performed by Dustin Bhatti DO at [facility] OR    SHOULDER SURGERY Right 1965    shoulder fracture, ORIF, football injury       Social History:    Marital status:   Smoking status: Currently smoker-x40 years  ETOH status:  Resides: [place], [place]    Family History:   Family History   Problem Relation Age of Onset    Diabetes Mother     Colon Polyps Mother     Cancer Father         Lung    Lung Cancer Father     No Known Problems Sister     Other Brother         disability with back    No Known Problems Maternal Grandmother     Heart Disease  Maternal Grandfather     No Known Problems Paternal Grandmother     No Known Problems Paternal Grandfather     Pancreatic Cancer Daughter     Diabetes type 2  Daughter     Thyroid Disease Daughter     High Cholesterol Daughter     Colon Cancer Neg Hx     Esophageal Cancer Neg Hx     Liver Cancer Neg Hx     Liver Disease Neg Hx     Rectal Cancer Neg Hx     Stomach Cancer Neg Hx        Current Hospital Medications:    Current Outpatient Medications   Medication Sig Dispense Refill    apixaban (ELIQUIS) 2.5 MG TABS tablet Take 2.5 mg by mouth every 12 hours      levoFLOXacin (LEVAQUIN) 500 MG tablet Take 500 mg by mouth daily (Patient not taking: Reported on 3/15/2023)      Cyanocobalamin (VITAMIN B 12 PO) Take 4,000 mg by mouth once (Patient not taking: Reported on 3/15/2023)      Ondansetron HCl (ZOFRAN PO) Take by mouth (Patient not taking: Reported on 3/15/2023)       No current facility-administered medications for this visit.      Facility-Administered Medications Ordered in Other Visits   Medication Dose Route Frequency Provider Last Rate Last Admin    sodium chloride flush 0.9 % injection 5-40 mL  5-40 mL IntraVENous PRN Santiago Pavon MD        heparin flush 100 UNIT/ML injection 500 Units  500 Units IntraCATHeter PRN Santiago Pavon MD           Allergies: No Known Allergies      Subjective   REVIEW OF SYSTEMS:   CONSTITUTIONAL: no fever, no night sweats, fatigue;  HEENT: no blurring of vision, no double vision, no hearing difficulty, no tinnitus, no ulceration, no dysplasia, no epistaxis;   LUNGS: no cough, no hemoptysis, no wheeze,  no shortness of breath;  CARDIOVASCULAR: no palpitation, no chest pain, no shortness of breath;  GI: no abdominal pain, no nausea, no vomiting, no diarrhea, no constipation;  GIDEON: no dysuria, no hematuria, no frequency or urgency, no nephrolithiasis;  MUSCULOSKELETAL: no joint pain, no swelling, no stiffness;  ENDOCRINE: no polyuria, no polydipsia, no cold or heat intolerance;  HEMATOLOGY: no easy bruising or bleeding, no history of clotting disorder;  DERMATOLOGY: no skin rash, no eczema, no pruritus;  PSYCHIATRY: no depression, no anxiety, no panic attacks, no suicidal ideation, no homicidal ideation;  NEUROLOGY: no syncope, no seizures, no numbness or tingling of hands, no numbness or tingling of feet, no paresis;       Objective   /66   Pulse 63   Ht 6' 3\" (1.905 m)   Wt 174 lb (78.9 kg)   SpO2 96%   BMI 21.75 kg/m²     PHYSICAL EXAM:  CONSTITUTIONAL: Alert, appropriate, no acute distress  EYES: Non icteric, EOM intact, pupils equal round   ENT: Mucus membranes moist, no oral pharyngeal lesions, external inspection of ears and nose are normal  NECK: Supple, no masses. No palpable thyroid mass  CHEST/LUNGS: CTA bilaterally, normal respiratory effort   CARDIOVASCULAR: RRR, no murmurs. No lower extremity edema  ABDOMEN: soft non-tender, active bowel sounds, no HSM. No palpable masses  EXTREMITIES: warm, full ROM in all 4 extremities, no focal weakness. SKIN: warm, dry with no rashes or lesions  LYMPH: No cervical, clavicular, axillary, or inguinal lymphadenopathy  NEUROLOGIC: follows commands, non focal   PSYCH: mood and affect appropriate. Alert and oriented to time, place, person      LABORATORY RESULTS REVIEWED/ANALYZED BY ME:  Lab Results   Component Value Date    WBC 5.14 01/11/2023    HGB 11.6 (L) 01/11/2023    HCT 35.2 (L) 01/11/2023    .7 (H) 01/11/2023     (L) 01/11/2023     Lab Results   Component Value Date    NEUTROABS 3.09 01/11/2023       RADIOLOGY STUDIES REVIEWED BY ME:  2/8/23 CT Chest, Abdomen, Pelvis W IV  Contrast No acute abnormality or metastatic disease identified within the chest,abdomen, or pelvis. Status post cystectomy with bilateral ureteral diversion and RIGHT lower quadrant urostomy. No hydronephrosis is seen. Pulmonary emphysema. Evidence of prior granulomatous disease.     ASSESSMENT:    Orders Placed This Encounter   Procedures    CT UROGRAM     Standing Status:   Future     Standing Expiration Date:   9/15/2024     Scheduling Instructions:      SCHED EARLY AUG     Order Specific Question:   Reason for exam:     Answer:   COMPARE TO PREVIOUS SCANS FOR SURVEILLANCE    CT CHEST W CONTRAST     Standing Status:   Future     Standing Expiration Date:   9/15/2024     Scheduling Instructions:      SCHED EARLY AUG     Order Specific Question:   STAT Creatinine as needed:     Answer:   Yes     Order Specific Question:   Reason for exam:     Answer:   COMPARE TO PREVIOUS SCANS FOR SURVEILLANCE    Comprehensive Metabolic Panel     Standing Status:   Future     Standing Expiration Date:   3/15/2024    Vitamin B12     Standing Status:   Future     Standing Expiration Date:   3/15/2024        Jayson Sarmiento was seen today for follow-up. Diagnoses and all orders for this visit:    Malignant neoplasm of overlapping sites of bladder [facility])  -     Comprehensive Metabolic Panel; Future  -     Vitamin B12; Future  -     CT UROGRAM; Future  -     CT CHEST W CONTRAST; Future    Care plan discussed with patient    Encounter for follow-up surveillance of bladder cancer  -     Comprehensive Metabolic Panel; Future  -     Vitamin B12; Future  -     CT UROGRAM; Future  -     CT CHEST W CONTRAST; Future           [id number] MIBC, June 2022  Dec 15th,2022- Anticipate Surgery-OR Radical Cystoprostatectomy, Bilateral Pelvic Lymph Node Dissection with Ileal Conduit on 12/15/2022    2/8/23 CT Chest, Abdomen, Pelvis W IV  Contrast No acute abnormality or metastatic disease identified within the chest,abdomen, or pelvis. Status post cystectomy with bilateral ureteral diversion and RIGHT lower quadrant urostomy. No hydronephrosis is seen. Pulmonary emphysema. Evidence of prior granulomatous disease.   [place] follow-up recommendations:  CT abd/pelvis every 3-6 months for 2 years, then annually to year 11, then u/s annually to year 10;  - Chest imaging with CXR or CT every 3-6 months for 2 years, then annually to year 5; next chest imaging for this patient  - Blood and urine studies: BMP/CMP +/- CBC at each visit in year 1, then annually to year 5. Vitamin B12 level annually in year 2-5;    DENIS Pulmonary nodule, subcentimeter  -CT Chest  3mm DENIS nodule  Repeat CT chest in 4 months, around March 2023    PLAN:  RTC with MD end of August  CBC CMP B12 today  Repeat CT scan Chest and CT urogram early Aug 2023  Continue follow-up with Dr Alvaro Dutton, Aug 2023  Port flush today and every 8 weeks    ISilas am pre charting  as Medical Assistant for Abhijit Hicks MD. Electronically signed by Silas El MA on 3/15/2023 at 3:45 PM CDT. Kale Szymanski am scribing for Abhijit Hicks MD. Electronically signed by Stacy Robles RN on 3/15/2023 at 12:06 PM CDT. I, Dr Romo Overall, personally performed the services described in this documentation as scribed by Stacy Robles RN in my presence and is both accurate and complete. I have seen, examined and reviewed this patient medication list, appropriate labs and imaging studies. I reviewed relevant medical records and others physicians notes. I discussed the plans of care with the patient. I answered all the questions to the patients satisfaction. I have also reviewed the chief complaint (CC) and part of the history (History of Present Illness (HPI), Past Family Social History [facility]), or Review of Systems (ROS) and made changes when appropriated. (Please note that portions of this note were completed with a voice recognition program. Efforts were made to edit the dictations but occasionally words are mis-transcribed. )Electronically signed by Abhijit Hicks MD on 3/15/2023 at 12:11 PM

## 2023-03-15 ENCOUNTER — OFFICE VISIT (OUTPATIENT)
Dept: HEMATOLOGY | Age: 73
End: 2023-03-15
Payer: COMMERCIAL

## 2023-03-15 ENCOUNTER — HOSPITAL ENCOUNTER (OUTPATIENT)
Dept: INFUSION THERAPY | Age: 73
Discharge: HOME | End: 2023-03-15
Payer: COMMERCIAL

## 2023-03-15 VITALS
BODY MASS INDEX: 21.64 KG/M2 | WEIGHT: 174 LBS | HEART RATE: 63 BPM | HEIGHT: 75 IN | OXYGEN SATURATION: 96 % | SYSTOLIC BLOOD PRESSURE: 124 MMHG | DIASTOLIC BLOOD PRESSURE: 66 MMHG

## 2023-03-15 DIAGNOSIS — Z08 ENCOUNTER FOR FOLLOW-UP SURVEILLANCE OF BLADDER CANCER: ICD-10-CM

## 2023-03-15 DIAGNOSIS — C67.8 MALIGNANT NEOPLASM OF OVERLAPPING SITES OF BLADDER (HCC): ICD-10-CM

## 2023-03-15 DIAGNOSIS — Z85.51 ENCOUNTER FOR FOLLOW-UP SURVEILLANCE OF BLADDER CANCER: ICD-10-CM

## 2023-03-15 DIAGNOSIS — Z45.2 ENCOUNTER FOR CARE RELATED TO PORT-A-CATH: ICD-10-CM

## 2023-03-15 DIAGNOSIS — C67.8 MALIGNANT NEOPLASM OF OVERLAPPING SITES OF BLADDER (HCC): Primary | ICD-10-CM

## 2023-03-15 DIAGNOSIS — Z95.828 PORT-A-CATH IN PLACE: ICD-10-CM

## 2023-03-15 DIAGNOSIS — Z71.89 CARE PLAN DISCUSSED WITH PATIENT: ICD-10-CM

## 2023-03-15 LAB
ALBUMIN SERPL-MCNC: 4.3 G/DL (ref 3.5–5.2)
ALP SERPL-CCNC: 78 U/L (ref 40–130)
ALT SERPL-CCNC: 16 U/L (ref 21–72)
ANION GAP SERPL CALCULATED.3IONS-SCNC: 7 MMOL/L (ref 7–19)
AST SERPL-CCNC: 23 U/L (ref 17–59)
BILIRUB SERPL-MCNC: 1.4 MG/DL (ref 0.2–1.3)
BUN SERPL-MCNC: 21 MG/DL (ref 9–20)
CALCIUM SERPL-MCNC: 9.5 MG/DL (ref 8.4–10.2)
CHLORIDE SERPL-SCNC: 105 MMOL/L (ref 98–111)
CO2 SERPL-SCNC: 27 MMOL/L (ref 22–29)
CREAT SERPL-MCNC: 0.8 MG/DL (ref 0.6–1.2)
ERYTHROCYTE [DISTWIDTH] IN BLOOD BY AUTOMATED COUNT: 12 % (ref 11.6–14.4)
GLOBULIN: 2.7 G/DL
GLUCOSE SERPL-MCNC: 86 MG/DL (ref 74–106)
HCT VFR BLD AUTO: 39.2 % (ref 40.1–51)
HGB BLD-MCNC: 13 G/DL (ref 13.7–17.5)
LYMPHOCYTES # BLD: 1.17 K/UL (ref 1.18–3.74)
LYMPHOCYTES NFR BLD: 19.6 % (ref 19.3–53.1)
MCH RBC QN AUTO: 32.4 PG (ref 25.7–32.2)
MCHC RBC AUTO-ENTMCNC: 33.2 G/DL (ref 32.3–36.5)
MCV RBC AUTO: 97.8 FL (ref 79–92.2)
MONOCYTES # BLD: 0.65 K/UL (ref 0.24–0.82)
MONOCYTES NFR BLD: 10.9 % (ref 4.7–12.5)
NEUTROPHILS # BLD: 3.84 K/UL (ref 1.56–6.13)
NEUTS SEG NFR BLD: 64.5 % (ref 34–71.1)
PLATELET # BLD AUTO: 136 K/UL (ref 163–337)
PMV BLD AUTO: 10.6 FL (ref 7.4–10.4)
POTASSIUM SERPL-SCNC: 4 MMOL/L (ref 3.5–5.1)
PROT SERPL-MCNC: 7 G/DL (ref 6.3–8.2)
RBC # BLD AUTO: 4.01 M/UL (ref 4.63–6.08)
SODIUM SERPL-SCNC: 139 MMOL/L (ref 137–145)
VIT B12 SERPL-MCNC: 221 PG/ML (ref 211–946)
WBC # BLD AUTO: 5.96 K/UL (ref 4.23–9.07)

## 2023-03-15 PROCEDURE — 99212 OFFICE O/P EST SF 10 MIN: CPT

## 2023-03-15 PROCEDURE — G8427 DOCREV CUR MEDS BY ELIG CLIN: HCPCS | Performed by: INTERNAL MEDICINE

## 2023-03-15 PROCEDURE — 80053 COMPREHEN METABOLIC PANEL: CPT

## 2023-03-15 PROCEDURE — 85025 COMPLETE CBC W/AUTO DIFF WBC: CPT

## 2023-03-15 PROCEDURE — 99213 OFFICE O/P EST LOW 20 MIN: CPT | Performed by: INTERNAL MEDICINE

## 2023-03-15 PROCEDURE — G8420 CALC BMI NORM PARAMETERS: HCPCS | Performed by: INTERNAL MEDICINE

## 2023-03-15 PROCEDURE — 4004F PT TOBACCO SCREEN RCVD TLK: CPT | Performed by: INTERNAL MEDICINE

## 2023-03-15 PROCEDURE — 36415 COLL VENOUS BLD VENIPUNCTURE: CPT

## 2023-03-15 PROCEDURE — 1123F ACP DISCUSS/DSCN MKR DOCD: CPT | Performed by: INTERNAL MEDICINE

## 2023-03-15 PROCEDURE — 6360000002 HC RX W HCPCS: Performed by: INTERNAL MEDICINE

## 2023-03-15 PROCEDURE — 96523 IRRIG DRUG DELIVERY DEVICE: CPT

## 2023-03-15 PROCEDURE — G8484 FLU IMMUNIZE NO ADMIN: HCPCS | Performed by: INTERNAL MEDICINE

## 2023-03-15 PROCEDURE — 2580000003 HC RX 258: Performed by: INTERNAL MEDICINE

## 2023-03-15 PROCEDURE — 3017F COLORECTAL CA SCREEN DOC REV: CPT | Performed by: INTERNAL MEDICINE

## 2023-03-15 RX ORDER — HEPARIN SODIUM (PORCINE) LOCK FLUSH IV SOLN 100 UNIT/ML 100 UNIT/ML
500 SOLUTION INTRAVENOUS PRN
Status: DISCONTINUED | OUTPATIENT
Start: 2023-03-15 | End: 2023-03-16 | Stop reason: HOSPADM

## 2023-03-15 RX ORDER — SODIUM CHLORIDE 9 MG/ML
25 INJECTION, SOLUTION INTRAVENOUS PRN
OUTPATIENT
Start: 2023-03-15

## 2023-03-15 RX ORDER — SODIUM CHLORIDE 0.9 % (FLUSH) 0.9 %
5-40 SYRINGE (ML) INJECTION PRN
OUTPATIENT
Start: 2023-03-15

## 2023-03-15 RX ORDER — HEPARIN SODIUM (PORCINE) LOCK FLUSH IV SOLN 100 UNIT/ML 100 UNIT/ML
500 SOLUTION INTRAVENOUS PRN
OUTPATIENT
Start: 2023-03-15

## 2023-03-15 RX ORDER — SODIUM CHLORIDE 0.9 % (FLUSH) 0.9 %
5-40 SYRINGE (ML) INJECTION PRN
Status: DISCONTINUED | OUTPATIENT
Start: 2023-03-15 | End: 2023-03-16 | Stop reason: HOSPADM

## 2023-03-15 RX ADMIN — HEPARIN 500 UNITS: 100 SYRINGE at 14:30

## 2023-03-15 RX ADMIN — SODIUM CHLORIDE, PRESERVATIVE FREE 20 ML: 5 INJECTION INTRAVENOUS at 14:30

## 2023-03-16 ENCOUNTER — TELEPHONE (OUTPATIENT)
Dept: HEMATOLOGY | Age: 73
End: 2023-03-16

## 2023-03-16 NOTE — TELEPHONE ENCOUNTER
I have spoken to patient's wife regarding 's recommendation to start to take vitamin B12 1000mg by mouth daily. Patient will proceed to get supplement and has verbalized understanding recommendations.

## 2023-03-31 ENCOUNTER — OFFICE VISIT (OUTPATIENT)
Dept: PRIMARY CARE CLINIC | Age: 73
End: 2023-03-31
Payer: COMMERCIAL

## 2023-03-31 VITALS
BODY MASS INDEX: 21.93 KG/M2 | HEIGHT: 75 IN | WEIGHT: 176.38 LBS | OXYGEN SATURATION: 96 % | SYSTOLIC BLOOD PRESSURE: 124 MMHG | TEMPERATURE: 97.9 F | HEART RATE: 64 BPM | DIASTOLIC BLOOD PRESSURE: 78 MMHG

## 2023-03-31 DIAGNOSIS — Z00.00 MEDICARE ANNUAL WELLNESS VISIT, SUBSEQUENT: Primary | ICD-10-CM

## 2023-03-31 DIAGNOSIS — R20.2 PARESTHESIA OF BOTH FEET: ICD-10-CM

## 2023-03-31 DIAGNOSIS — E53.8 VITAMIN B12 DEFICIENCY: ICD-10-CM

## 2023-03-31 DIAGNOSIS — Z12.11 COLON CANCER SCREENING: ICD-10-CM

## 2023-03-31 DIAGNOSIS — Z13.220 SCREENING FOR HYPERLIPIDEMIA: ICD-10-CM

## 2023-03-31 PROCEDURE — 1123F ACP DISCUSS/DSCN MKR DOCD: CPT | Performed by: INTERNAL MEDICINE

## 2023-03-31 PROCEDURE — G8484 FLU IMMUNIZE NO ADMIN: HCPCS | Performed by: INTERNAL MEDICINE

## 2023-03-31 PROCEDURE — G0439 PPPS, SUBSEQ VISIT: HCPCS | Performed by: INTERNAL MEDICINE

## 2023-03-31 PROCEDURE — 3017F COLORECTAL CA SCREEN DOC REV: CPT | Performed by: INTERNAL MEDICINE

## 2023-03-31 SDOH — ECONOMIC STABILITY: FOOD INSECURITY: WITHIN THE PAST 12 MONTHS, THE FOOD YOU BOUGHT JUST DIDN'T LAST AND YOU DIDN'T HAVE MONEY TO GET MORE.: NEVER TRUE

## 2023-03-31 SDOH — ECONOMIC STABILITY: INCOME INSECURITY: HOW HARD IS IT FOR YOU TO PAY FOR THE VERY BASICS LIKE FOOD, HOUSING, MEDICAL CARE, AND HEATING?: NOT VERY HARD

## 2023-03-31 SDOH — ECONOMIC STABILITY: HOUSING INSECURITY
IN THE LAST 12 MONTHS, WAS THERE A TIME WHEN YOU DID NOT HAVE A STEADY PLACE TO SLEEP OR SLEPT IN A SHELTER (INCLUDING NOW)?: NO

## 2023-03-31 SDOH — ECONOMIC STABILITY: FOOD INSECURITY: WITHIN THE PAST 12 MONTHS, YOU WORRIED THAT YOUR FOOD WOULD RUN OUT BEFORE YOU GOT MONEY TO BUY MORE.: NEVER TRUE

## 2023-03-31 ASSESSMENT — PATIENT HEALTH QUESTIONNAIRE - PHQ9
SUM OF ALL RESPONSES TO PHQ9 QUESTIONS 1 & 2: 0
SUM OF ALL RESPONSES TO PHQ QUESTIONS 1-9: 0
2. FEELING DOWN, DEPRESSED OR HOPELESS: 0
1. LITTLE INTEREST OR PLEASURE IN DOING THINGS: 0
SUM OF ALL RESPONSES TO PHQ QUESTIONS 1-9: 0

## 2023-03-31 NOTE — PROGRESS NOTES
Medicare Annual Wellness Visit    [address] is here for Medicare AWV    Assessment & Plan   Medicare annual wellness visit, subsequent  -     Comprehensive Metabolic Panel; Future  Vitamin B12 deficiency  Comments:  Patient declines vitamin B12 injections to try for better control of symptoms. He will continue oral B12 replacement. Orders:  -     CBC with Auto Differential; Future  -     Vitamin B12; Future  Paresthesia of both feet  Screening for hyperlipidemia  -     Lipid Panel; Future  Colon cancer screening  Comments:  Requested patient schedule his surveillance colonoscopy that was due in Nov 2022 due to hx of tubular adenomatous polyps. Recommendations for Preventive Services Due: see orders and patient instructions/AVS.  Recommended screening schedule for the next 5-10 years is provided to the patient in written form: see Patient Instructions/AVS.     Return in about 1 year (around 3/31/2024) for medicare wellness. Subjective   The following acute and/or chronic problems were also addressed today:  Patient c/o numbness in his feet when he takes his shoes and socks off but it does not bother him. He also feels like he has a change in sensation in how his skin feels \"all over his body\". He finished up his chemotherapy in December 2022 and he did notice these issues with his 2nd dose of chemotherapy. He is being treated with oral B12 supplement by his Oncology for vitamin B12 deficiency and he is in remission currently for his bladder cancer. Patient's complete Health Risk Assessment and screening values have been reviewed and are found in Flowsheets. The following problems were reviewed today and where indicated follow up appointments were made and/or referrals ordered.     Positive Risk Factor Screenings with Interventions:                 Weight and Activity:  Physical Activity: Inactive    Days of Exercise per Week: 0 days    Minutes of Exercise per Session: 0 min     On average, how

## 2023-03-31 NOTE — PATIENT INSTRUCTIONS
more about \"A Healthy Heart: Care Instructions. \"  Current as of: September 7, 2022               Content Version: 13.6  © 2006-2023 Healthwise, [employer]. Care instructions adapted under license by [facility] ([facility]). If you have questions about a medical condition or this instruction, always ask your healthcare professional. [address] any warranty or liability for your use of this information. Personalized Preventive Plan for Ethan Ellis - 3/31/2023  Medicare offers a range of preventive health benefits. Some of the tests and screenings are paid in full while other may be subject to a deductible, co-insurance, and/or copay. Some of these benefits include a comprehensive review of your medical history including lifestyle, illnesses that may run in your family, and various assessments and screenings as appropriate. After reviewing your medical record and screening and assessments performed today your provider may have ordered immunizations, labs, imaging, and/or referrals for you. A list of these orders (if applicable) as well as your Preventive Care list are included within your After Visit Summary for your review. Other Preventive Recommendations:    A preventive eye exam performed by an eye specialist is recommended every 1-2 years to screen for glaucoma; cataracts, macular degeneration, and other eye disorders. A preventive dental visit is recommended every 6 months. Try to get at least 150 minutes of exercise per week or 10,000 steps per day on a pedometer . Order or download the FREE \"Exercise & Physical Activity: Your Everyday Guide\" from [employer]. Call 4-717.904.3791 or search [employer] online. You need 9576-2014 mg of calcium and 4228-7095 IU of vitamin D per day.  It is possible to meet your calcium requirement with diet alone, but a vitamin D supplement is usually necessary to meet this goal.  When exposed to

## 2023-04-30 PROBLEM — Z12.11 COLON CANCER SCREENING: Status: RESOLVED | Noted: 2023-03-31 | Resolved: 2023-04-30

## 2023-05-10 ENCOUNTER — HOSPITAL ENCOUNTER (OUTPATIENT)
Dept: INFUSION THERAPY | Age: 73
Discharge: HOME | End: 2023-05-10
Payer: COMMERCIAL

## 2023-05-10 DIAGNOSIS — Z45.2 ENCOUNTER FOR CARE RELATED TO PORT-A-CATH: ICD-10-CM

## 2023-05-10 DIAGNOSIS — Z95.828 PORT-A-CATH IN PLACE: Primary | ICD-10-CM

## 2023-05-10 PROCEDURE — 2580000003 HC RX 258: Performed by: INTERNAL MEDICINE

## 2023-05-10 PROCEDURE — 96523 IRRIG DRUG DELIVERY DEVICE: CPT

## 2023-05-10 PROCEDURE — 6360000002 HC RX W HCPCS: Performed by: INTERNAL MEDICINE

## 2023-05-10 RX ORDER — SODIUM CHLORIDE 0.9 % (FLUSH) 0.9 %
5-40 SYRINGE (ML) INJECTION PRN
Status: DISCONTINUED | OUTPATIENT
Start: 2023-05-10 | End: 2023-05-11 | Stop reason: HOSPADM

## 2023-05-10 RX ORDER — SODIUM CHLORIDE 0.9 % (FLUSH) 0.9 %
5-40 SYRINGE (ML) INJECTION PRN
OUTPATIENT
Start: 2023-05-10

## 2023-05-10 RX ORDER — SODIUM CHLORIDE 9 MG/ML
25 INJECTION, SOLUTION INTRAVENOUS PRN
OUTPATIENT
Start: 2023-05-10

## 2023-05-10 RX ORDER — HEPARIN SODIUM (PORCINE) LOCK FLUSH IV SOLN 100 UNIT/ML 100 UNIT/ML
500 SOLUTION INTRAVENOUS PRN
OUTPATIENT
Start: 2023-05-10

## 2023-05-10 RX ORDER — HEPARIN SODIUM (PORCINE) LOCK FLUSH IV SOLN 100 UNIT/ML 100 UNIT/ML
500 SOLUTION INTRAVENOUS PRN
Status: DISCONTINUED | OUTPATIENT
Start: 2023-05-10 | End: 2023-05-11 | Stop reason: HOSPADM

## 2023-05-10 RX ADMIN — SODIUM CHLORIDE, PRESERVATIVE FREE 10 ML: 5 INJECTION INTRAVENOUS at 08:30

## 2023-05-10 RX ADMIN — HEPARIN 500 UNITS: 100 SYRINGE at 08:30

## 2023-07-05 ENCOUNTER — HOSPITAL ENCOUNTER (OUTPATIENT)
Dept: INFUSION THERAPY | Age: 73
Discharge: HOME | End: 2023-07-05
Payer: MEDICARE

## 2023-07-05 DIAGNOSIS — Z95.828 PORT-A-CATH IN PLACE: Primary | ICD-10-CM

## 2023-07-05 DIAGNOSIS — Z45.2 ENCOUNTER FOR CARE RELATED TO PORT-A-CATH: ICD-10-CM

## 2023-07-05 PROCEDURE — 2580000003 HC RX 258: Performed by: INTERNAL MEDICINE

## 2023-07-05 PROCEDURE — 6360000002 HC RX W HCPCS: Performed by: INTERNAL MEDICINE

## 2023-07-05 PROCEDURE — 96523 IRRIG DRUG DELIVERY DEVICE: CPT

## 2023-07-05 RX ORDER — HEPARIN SODIUM 100 [USP'U]/ML
500 INJECTION, SOLUTION INTRAVENOUS PRN
OUTPATIENT
Start: 2023-07-05

## 2023-07-05 RX ORDER — SODIUM CHLORIDE 9 MG/ML
25 INJECTION, SOLUTION INTRAVENOUS PRN
OUTPATIENT
Start: 2023-07-05

## 2023-07-05 RX ORDER — HEPARIN SODIUM 100 [USP'U]/ML
500 INJECTION, SOLUTION INTRAVENOUS PRN
Status: DISCONTINUED | OUTPATIENT
Start: 2023-07-05 | End: 2023-07-06 | Stop reason: HOSPADM

## 2023-07-05 RX ORDER — SODIUM CHLORIDE 0.9 % (FLUSH) 0.9 %
5-40 SYRINGE (ML) INJECTION PRN
Status: DISCONTINUED | OUTPATIENT
Start: 2023-07-05 | End: 2023-07-06 | Stop reason: HOSPADM

## 2023-07-05 RX ORDER — SODIUM CHLORIDE 0.9 % (FLUSH) 0.9 %
5-40 SYRINGE (ML) INJECTION PRN
OUTPATIENT
Start: 2023-07-05

## 2023-07-05 RX ADMIN — SODIUM CHLORIDE, PRESERVATIVE FREE 10 ML: 5 INJECTION INTRAVENOUS at 08:34

## 2023-07-05 RX ADMIN — HEPARIN 500 UNITS: 100 SYRINGE at 08:34

## 2023-08-09 ENCOUNTER — HOSPITAL ENCOUNTER (OUTPATIENT)
Dept: INFUSION THERAPY | Age: 73
Discharge: HOME | End: 2023-08-09
Payer: MEDICARE

## 2023-08-09 ENCOUNTER — HOSPITAL ENCOUNTER (OUTPATIENT)
Dept: CT IMAGING | Age: 73
Discharge: HOME | End: 2023-08-09
Payer: MEDICARE

## 2023-08-09 DIAGNOSIS — Z08 ENCOUNTER FOR FOLLOW-UP SURVEILLANCE OF BLADDER CANCER: ICD-10-CM

## 2023-08-09 DIAGNOSIS — Z85.51 ENCOUNTER FOR FOLLOW-UP SURVEILLANCE OF BLADDER CANCER: ICD-10-CM

## 2023-08-09 DIAGNOSIS — Z95.828 PORT-A-CATH IN PLACE: Primary | ICD-10-CM

## 2023-08-09 DIAGNOSIS — Z45.2 ENCOUNTER FOR CARE RELATED TO PORT-A-CATH: ICD-10-CM

## 2023-08-09 DIAGNOSIS — C67.8 MALIGNANT NEOPLASM OF OVERLAPPING SITES OF BLADDER (HCC): ICD-10-CM

## 2023-08-09 PROCEDURE — 6360000004 HC RX CONTRAST MEDICATION: Performed by: INTERNAL MEDICINE

## 2023-08-09 PROCEDURE — 96523 IRRIG DRUG DELIVERY DEVICE: CPT

## 2023-08-09 PROCEDURE — 74178 CT ABD&PLV WO CNTR FLWD CNTR: CPT | Performed by: INTERNAL MEDICINE

## 2023-08-09 PROCEDURE — 6360000002 HC RX W HCPCS: Performed by: INTERNAL MEDICINE

## 2023-08-09 PROCEDURE — 71260 CT THORAX DX C+: CPT

## 2023-08-09 PROCEDURE — 2580000003 HC RX 258: Performed by: INTERNAL MEDICINE

## 2023-08-09 RX ORDER — SODIUM CHLORIDE 9 MG/ML
25 INJECTION, SOLUTION INTRAVENOUS PRN
OUTPATIENT
Start: 2023-08-09

## 2023-08-09 RX ORDER — SODIUM CHLORIDE 0.9 % (FLUSH) 0.9 %
5-40 SYRINGE (ML) INJECTION PRN
Status: DISCONTINUED | OUTPATIENT
Start: 2023-08-09 | End: 2023-08-10 | Stop reason: HOSPADM

## 2023-08-09 RX ORDER — HEPARIN 100 UNIT/ML
500 SYRINGE INTRAVENOUS PRN
Status: DISCONTINUED | OUTPATIENT
Start: 2023-08-09 | End: 2023-08-10 | Stop reason: HOSPADM

## 2023-08-09 RX ORDER — SODIUM CHLORIDE 9 MG/ML
25 INJECTION, SOLUTION INTRAVENOUS PRN
Status: DISCONTINUED | OUTPATIENT
Start: 2023-08-09 | End: 2023-08-10 | Stop reason: HOSPADM

## 2023-08-09 RX ORDER — HEPARIN 100 UNIT/ML
500 SYRINGE INTRAVENOUS PRN
OUTPATIENT
Start: 2023-08-09

## 2023-08-09 RX ORDER — SODIUM CHLORIDE 0.9 % (FLUSH) 0.9 %
5-40 SYRINGE (ML) INJECTION PRN
OUTPATIENT
Start: 2023-08-09

## 2023-08-09 RX ORDER — LIDOCAINE AND PRILOCAINE 25; 25 MG/G; MG/G
CREAM TOPICAL
Qty: 1 EACH | Refills: 2 | Status: SHIPPED | OUTPATIENT
Start: 2023-08-09

## 2023-08-09 RX ADMIN — IOPAMIDOL 75 ML: 755 INJECTION, SOLUTION INTRAVENOUS at 09:18

## 2023-08-09 RX ADMIN — SODIUM CHLORIDE, PRESERVATIVE FREE 10 ML: 5 INJECTION INTRAVENOUS at 08:59

## 2023-08-09 RX ADMIN — HEPARIN 500 UNITS: 100 SYRINGE at 08:59

## 2023-09-11 ENCOUNTER — TELEPHONE (OUTPATIENT)
Dept: HEMATOLOGY | Age: 73
End: 2023-09-11

## 2023-09-11 DIAGNOSIS — C67.8 MALIGNANT NEOPLASM OF OVERLAPPING SITES OF BLADDER (HCC): Primary | ICD-10-CM

## 2023-09-11 NOTE — PROGRESS NOTES
MEDICAL ONCOLOGY PROGRESS NOTE    Pt Name: Michael Henriquez  MRN: [id number]  YOB: 1950  Date of evaluation: 9/12/2023    HISTORY OF PRESENT ILLNESS:    Reason for MD visit-toxicity assessment/disease management. The patient is currently receiving neoadjuvant chemotherapy with dose dense MVAC for his diagnosis of muscle invasive bladder cancer. The patient presents today for surveillance. He denies any new complaints. He denies any constitutional symptoms. Denies weight loss. Diagnosis  Muscle invasive high grade urothelial carcinoma, bladder, June 2022  Stage II, [id number]->[id number]    Treatment Summary  6/29/22- TURBT by Dr Shoshana Padilla  8/31/22-10/13/2022-completion of 4 cycles neoadjuvant chemotherapy dose dense MVAC with G-CSF support  12/15/22 cystoprostatectomy with Dr. George Nelson at [facility] was first seen by me on 8/10/2022. He was referred by urology, [facility] for diagnosis of muscle invasive bladder cancer. Patient presented with complaints of hematuria in June 2022. Imaging studies showed sessile mass involving the right side of his bladder. No pelvic respiratory adenopathy. No evidence of distant metastasis. Cystoscopy was performed for transurethral resection of bladder tumor. Biopsy consistent with muscle invasive disease. Therefore, he was referred to me and also urology at [facility]. The patient denies any major comorbidities. He is quite functional.  Patient is a current smoker. 5/6/22 CT abd/pelvis: Sessile appearing mass in the base of the bladder on the right, measuring approximately 3.7 x 2.7 cm, this is compatible with bladder neoplasm until proven otherwise. Consider follow-up with direct visualization and biopsy. The mass partially covers the right ureteral orifice. There is no urinary tract obstruction and the kidneys and ureters appear unremarkable except for a 2 mm nonobstructing stone in the right mid kidney.

## 2023-09-11 NOTE — TELEPHONE ENCOUNTER
Called pt to remind of appt date and time. Pt voiced understanding.     Electronically signed by Moses Cabrera MA on 9/11/2023 at 3:15 PM

## 2023-09-12 ENCOUNTER — OFFICE VISIT (OUTPATIENT)
Dept: HEMATOLOGY | Age: 73
End: 2023-09-12
Payer: MEDICARE

## 2023-09-12 ENCOUNTER — HOSPITAL ENCOUNTER (OUTPATIENT)
Dept: INFUSION THERAPY | Age: 73
Discharge: HOME | End: 2023-09-12
Payer: MEDICARE

## 2023-09-12 VITALS
BODY MASS INDEX: 22.77 KG/M2 | DIASTOLIC BLOOD PRESSURE: 70 MMHG | OXYGEN SATURATION: 96 % | WEIGHT: 183.1 LBS | HEIGHT: 75 IN | TEMPERATURE: 98 F | SYSTOLIC BLOOD PRESSURE: 126 MMHG | HEART RATE: 62 BPM

## 2023-09-12 DIAGNOSIS — Z95.828 PORT-A-CATH IN PLACE: Primary | ICD-10-CM

## 2023-09-12 DIAGNOSIS — C67.8 MALIGNANT NEOPLASM OF OVERLAPPING SITES OF BLADDER (HCC): ICD-10-CM

## 2023-09-12 DIAGNOSIS — Z45.2 ENCOUNTER FOR CARE RELATED TO PORT-A-CATH: ICD-10-CM

## 2023-09-12 DIAGNOSIS — C67.9 MALIGNANT NEOPLASM OF URINARY BLADDER, UNSPECIFIED SITE (HCC): ICD-10-CM

## 2023-09-12 DIAGNOSIS — Z71.89 CARE PLAN DISCUSSED WITH PATIENT: Primary | ICD-10-CM

## 2023-09-12 LAB
BASOPHILS # BLD: 0.08 K/UL (ref 0.01–0.08)
BASOPHILS NFR BLD: 1.2 % (ref 0.1–1.2)
EOSINOPHIL # BLD: 0.26 K/UL (ref 0.04–0.54)
EOSINOPHIL NFR BLD: 4 % (ref 0.7–7)
ERYTHROCYTE [DISTWIDTH] IN BLOOD BY AUTOMATED COUNT: 13.6 % (ref 11.6–14.4)
HCT VFR BLD AUTO: 41.6 % (ref 40.1–51)
HGB BLD-MCNC: 14.5 G/DL (ref 13.7–17.5)
LYMPHOCYTES # BLD: 1.26 K/UL (ref 1.18–3.74)
LYMPHOCYTES NFR BLD: 19.5 % (ref 19.3–53.1)
MCH RBC QN AUTO: 31.5 PG (ref 25.7–32.2)
MCHC RBC AUTO-ENTMCNC: 34.9 G/DL (ref 32.3–36.5)
MCV RBC AUTO: 90.4 FL (ref 79–92.2)
MONOCYTES # BLD: 0.89 K/UL (ref 0.24–0.82)
MONOCYTES NFR BLD: 13.8 % (ref 4.7–12.5)
NEUTROPHILS # BLD: 3.91 K/UL (ref 1.56–6.13)
NEUTS SEG NFR BLD: 60.6 % (ref 34–71.1)
PLATELET # BLD AUTO: 149 K/UL (ref 163–337)
PMV BLD AUTO: 10.7 FL (ref 7.4–10.4)
RBC # BLD AUTO: 4.6 M/UL (ref 4.63–6.08)
WBC # BLD AUTO: 6.46 K/UL (ref 4.23–9.07)

## 2023-09-12 PROCEDURE — 36415 COLL VENOUS BLD VENIPUNCTURE: CPT

## 2023-09-12 PROCEDURE — G8427 DOCREV CUR MEDS BY ELIG CLIN: HCPCS | Performed by: INTERNAL MEDICINE

## 2023-09-12 PROCEDURE — 3017F COLORECTAL CA SCREEN DOC REV: CPT | Performed by: INTERNAL MEDICINE

## 2023-09-12 PROCEDURE — 99212 OFFICE O/P EST SF 10 MIN: CPT

## 2023-09-12 PROCEDURE — 96523 IRRIG DRUG DELIVERY DEVICE: CPT

## 2023-09-12 PROCEDURE — 1123F ACP DISCUSS/DSCN MKR DOCD: CPT | Performed by: INTERNAL MEDICINE

## 2023-09-12 PROCEDURE — 2580000003 HC RX 258: Performed by: INTERNAL MEDICINE

## 2023-09-12 PROCEDURE — 99213 OFFICE O/P EST LOW 20 MIN: CPT | Performed by: INTERNAL MEDICINE

## 2023-09-12 PROCEDURE — 85025 COMPLETE CBC W/AUTO DIFF WBC: CPT

## 2023-09-12 PROCEDURE — 6360000002 HC RX W HCPCS: Performed by: INTERNAL MEDICINE

## 2023-09-12 PROCEDURE — G8420 CALC BMI NORM PARAMETERS: HCPCS | Performed by: INTERNAL MEDICINE

## 2023-09-12 PROCEDURE — 4004F PT TOBACCO SCREEN RCVD TLK: CPT | Performed by: INTERNAL MEDICINE

## 2023-09-12 RX ORDER — SODIUM CHLORIDE 9 MG/ML
25 INJECTION, SOLUTION INTRAVENOUS PRN
OUTPATIENT
Start: 2023-09-12

## 2023-09-12 RX ORDER — HEPARIN 100 UNIT/ML
500 SYRINGE INTRAVENOUS PRN
OUTPATIENT
Start: 2023-09-12

## 2023-09-12 RX ORDER — SODIUM CHLORIDE 0.9 % (FLUSH) 0.9 %
5-40 SYRINGE (ML) INJECTION PRN
Status: DISCONTINUED | OUTPATIENT
Start: 2023-09-12 | End: 2023-09-13 | Stop reason: HOSPADM

## 2023-09-12 RX ORDER — SODIUM CHLORIDE 0.9 % (FLUSH) 0.9 %
5-40 SYRINGE (ML) INJECTION PRN
OUTPATIENT
Start: 2023-09-12

## 2023-09-12 RX ORDER — HEPARIN 100 UNIT/ML
500 SYRINGE INTRAVENOUS PRN
Status: DISCONTINUED | OUTPATIENT
Start: 2023-09-12 | End: 2023-09-13 | Stop reason: HOSPADM

## 2023-09-12 RX ADMIN — SODIUM CHLORIDE, PRESERVATIVE FREE 10 ML: 5 INJECTION INTRAVENOUS at 10:38

## 2023-09-12 RX ADMIN — HEPARIN 500 UNITS: 100 SYRINGE at 10:38

## 2023-12-04 ENCOUNTER — HOSPITAL ENCOUNTER (OUTPATIENT)
Dept: INFUSION THERAPY | Age: 73
Discharge: HOME | End: 2023-12-04
Payer: MEDICARE

## 2023-12-04 ENCOUNTER — HOSPITAL ENCOUNTER (OUTPATIENT)
Dept: CT IMAGING | Age: 73
Discharge: HOME | End: 2023-12-04
Payer: MEDICARE

## 2023-12-04 DIAGNOSIS — C67.9 MALIGNANT NEOPLASM OF URINARY BLADDER, UNSPECIFIED SITE (HCC): ICD-10-CM

## 2023-12-04 DIAGNOSIS — C67.8 MALIGNANT NEOPLASM OF OVERLAPPING SITES OF BLADDER (HCC): ICD-10-CM

## 2023-12-04 DIAGNOSIS — Z45.2 ENCOUNTER FOR CARE RELATED TO PORT-A-CATH: Primary | ICD-10-CM

## 2023-12-04 DIAGNOSIS — Z95.828 PORT-A-CATH IN PLACE: Primary | ICD-10-CM

## 2023-12-04 DIAGNOSIS — Z45.2 ENCOUNTER FOR CARE RELATED TO PORT-A-CATH: ICD-10-CM

## 2023-12-04 LAB
ALBUMIN SERPL-MCNC: 4.3 G/DL (ref 3.5–5.2)
ALP SERPL-CCNC: 92 U/L (ref 40–130)
ALT SERPL-CCNC: 22 U/L (ref 21–72)
ANION GAP SERPL CALCULATED.3IONS-SCNC: 10 MMOL/L (ref 7–19)
AST SERPL-CCNC: 23 U/L (ref 17–59)
BASOPHILS # BLD: 0.07 K/UL (ref 0.01–0.08)
BASOPHILS NFR BLD: 1.3 % (ref 0.1–1.2)
BILIRUB SERPL-MCNC: 1.4 MG/DL (ref 0.2–1.3)
BUN SERPL-MCNC: 21 MG/DL (ref 9–20)
CALCIUM SERPL-MCNC: 9.5 MG/DL (ref 8.4–10.2)
CHLORIDE SERPL-SCNC: 104 MMOL/L (ref 98–111)
CO2 SERPL-SCNC: 26 MMOL/L (ref 22–29)
CREAT SERPL-MCNC: 1 MG/DL (ref 0.6–1.2)
EOSINOPHIL # BLD: 0.15 K/UL (ref 0.04–0.54)
EOSINOPHIL NFR BLD: 2.7 % (ref 0.7–7)
ERYTHROCYTE [DISTWIDTH] IN BLOOD BY AUTOMATED COUNT: 12.6 % (ref 11.6–14.4)
GLOBULIN: 3.3 G/DL
GLUCOSE SERPL-MCNC: 99 MG/DL (ref 74–106)
HCT VFR BLD AUTO: 43.2 % (ref 40.1–51)
HGB BLD-MCNC: 14.5 G/DL (ref 13.7–17.5)
LYMPHOCYTES # BLD: 1.08 K/UL (ref 1.18–3.74)
LYMPHOCYTES NFR BLD: 19.5 % (ref 19.3–53.1)
MCH RBC QN AUTO: 31 PG (ref 25.7–32.2)
MCHC RBC AUTO-ENTMCNC: 33.6 G/DL (ref 32.3–36.5)
MCV RBC AUTO: 92.5 FL (ref 79–92.2)
MONOCYTES # BLD: 0.56 K/UL (ref 0.24–0.82)
MONOCYTES NFR BLD: 10.1 % (ref 4.7–12.5)
NEUTROPHILS # BLD: 3.65 K/UL (ref 1.56–6.13)
NEUTS SEG NFR BLD: 66 % (ref 34–71.1)
PLATELET # BLD AUTO: 154 K/UL (ref 163–337)
PMV BLD AUTO: 10.6 FL (ref 7.4–10.4)
POTASSIUM SERPL-SCNC: 4.8 MMOL/L (ref 3.5–5.1)
PROT SERPL-MCNC: 7.6 G/DL (ref 6.3–8.2)
RBC # BLD AUTO: 4.67 M/UL (ref 4.63–6.08)
SODIUM SERPL-SCNC: 140 MMOL/L (ref 137–145)
WBC # BLD AUTO: 5.53 K/UL (ref 4.23–9.07)

## 2023-12-04 PROCEDURE — 85025 COMPLETE CBC W/AUTO DIFF WBC: CPT

## 2023-12-04 PROCEDURE — 88112 CYTOPATH CELL ENHANCE TECH: CPT

## 2023-12-04 PROCEDURE — 74177 CT ABD & PELVIS W/CONTRAST: CPT

## 2023-12-04 PROCEDURE — 2580000003 HC RX 258: Performed by: INTERNAL MEDICINE

## 2023-12-04 PROCEDURE — 6360000004 HC RX CONTRAST MEDICATION: Performed by: INTERNAL MEDICINE

## 2023-12-04 PROCEDURE — 6360000002 HC RX W HCPCS: Performed by: INTERNAL MEDICINE

## 2023-12-04 PROCEDURE — 36591 DRAW BLOOD OFF VENOUS DEVICE: CPT

## 2023-12-04 PROCEDURE — 80053 COMPREHEN METABOLIC PANEL: CPT

## 2023-12-04 PROCEDURE — 71260 CT THORAX DX C+: CPT

## 2023-12-04 PROCEDURE — 36415 COLL VENOUS BLD VENIPUNCTURE: CPT

## 2023-12-04 RX ORDER — SODIUM CHLORIDE 0.9 % (FLUSH) 0.9 %
5-40 SYRINGE (ML) INJECTION PRN
Status: DISCONTINUED | OUTPATIENT
Start: 2023-12-04 | End: 2023-12-05 | Stop reason: HOSPADM

## 2023-12-04 RX ORDER — HEPARIN 100 UNIT/ML
500 SYRINGE INTRAVENOUS PRN
Status: DISCONTINUED | OUTPATIENT
Start: 2023-12-04 | End: 2023-12-05 | Stop reason: HOSPADM

## 2023-12-04 RX ADMIN — IOPAMIDOL 75 ML: 755 INJECTION, SOLUTION INTRAVENOUS at 09:56

## 2023-12-04 RX ADMIN — HEPARIN 500 UNITS: 100 SYRINGE at 09:09

## 2023-12-04 RX ADMIN — SODIUM CHLORIDE, PRESERVATIVE FREE 10 ML: 5 INJECTION INTRAVENOUS at 09:09

## 2023-12-08 ENCOUNTER — TELEPHONE (OUTPATIENT)
Dept: HEMATOLOGY | Age: 73
End: 2023-12-08

## 2023-12-08 NOTE — TELEPHONE ENCOUNTER
Called patient and reminded patient of their appointment on 12/11/2023 and patient confirmed they would be here.

## 2023-12-08 NOTE — PROGRESS NOTES
GLUCOSE 99 12/04/2023    CALCIUM 9.5 12/04/2023    PROT 7.6 12/04/2023    LABALBU 4.3 12/04/2023    BILITOT 1.4 (H) 12/04/2023    ALKPHOS 92 12/04/2023    AST 23 12/04/2023    ALT 22 12/04/2023    LABGLOM >60 12/04/2023    GFRAA >60 07/20/2022    GLOB 3.3 12/04/2023       RADIOLOGY STUDIES REVIEWED BY ME:  12/4/23 CT Chest W Contrast  Mild to moderate aortic atherosclerotic disease. Coronary artery calcifications are significant. No gross mediastinal or hilar lymphadenopathy. Lungs are clear with no suspicious opacities. 12/4/23 CT Abd/Pelvis W IV Contrast (Oral) No evidence of recurrence or metastatic disease in the abdomen or pelvis. ASSESSMENT:    Orders Placed This Encounter   Procedures    CT CHEST W CONTRAST     Standing Status:   Future     Standing Expiration Date:   6/11/2025     Scheduling Instructions:      Sched in 6 months     Order Specific Question:   STAT Creatinine as needed:     Answer:   No     Order Specific Question:   Reason for exam:     Answer:   COMPARE TO PREVIOUS SCANS FOR SURVEILLANCE    CT ABDOMEN PELVIS W IV CONTRAST Additional Contrast? Oral     Standing Status:   Future     Standing Expiration Date:   6/11/2025     Scheduling Instructions:      Sched in 6 months     Order Specific Question:   Additional Contrast?     Answer:   Oral     Order Specific Question:   STAT Creatinine as needed:     Answer:   No     Order Specific Question:   Reason for exam:     Answer:   COMPARE TO PREVIOUS SCANS FOR SURVEILLANCE    CBC with Auto Differential     Standing Status:   Future     Standing Expiration Date:   12/11/2024    Comprehensive Metabolic Panel     Standing Status:   Future     Standing Expiration Date:   12/11/2024    Vitamin B12     Standing Status:   Future     Standing Expiration Date:   12/11/2024        Miryam Turner was seen today for follow-up.     Diagnoses and all orders for this visit:    Care plan discussed with patient    Malignant neoplasm of overlapping sites of bladder

## 2023-12-11 ENCOUNTER — OFFICE VISIT (OUTPATIENT)
Dept: HEMATOLOGY | Age: 73
End: 2023-12-11
Payer: MEDICARE

## 2023-12-11 ENCOUNTER — HOSPITAL ENCOUNTER (OUTPATIENT)
Dept: INFUSION THERAPY | Age: 73
Discharge: HOME | End: 2023-12-11
Payer: MEDICARE

## 2023-12-11 VITALS
HEIGHT: 75 IN | SYSTOLIC BLOOD PRESSURE: 132 MMHG | TEMPERATURE: 97.3 F | WEIGHT: 187 LBS | DIASTOLIC BLOOD PRESSURE: 80 MMHG | BODY MASS INDEX: 23.25 KG/M2 | HEART RATE: 78 BPM | OXYGEN SATURATION: 98 %

## 2023-12-11 DIAGNOSIS — Z71.89 CARE PLAN DISCUSSED WITH PATIENT: Primary | ICD-10-CM

## 2023-12-11 DIAGNOSIS — C67.8 MALIGNANT NEOPLASM OF OVERLAPPING SITES OF BLADDER (HCC): ICD-10-CM

## 2023-12-11 DIAGNOSIS — E53.8 VITAMIN B 12 DEFICIENCY: ICD-10-CM

## 2023-12-11 PROCEDURE — 1036F TOBACCO NON-USER: CPT | Performed by: INTERNAL MEDICINE

## 2023-12-11 PROCEDURE — 1123F ACP DISCUSS/DSCN MKR DOCD: CPT | Performed by: INTERNAL MEDICINE

## 2023-12-11 PROCEDURE — 99213 OFFICE O/P EST LOW 20 MIN: CPT | Performed by: INTERNAL MEDICINE

## 2023-12-11 PROCEDURE — 99212 OFFICE O/P EST SF 10 MIN: CPT

## 2023-12-11 PROCEDURE — G8484 FLU IMMUNIZE NO ADMIN: HCPCS | Performed by: INTERNAL MEDICINE

## 2023-12-11 PROCEDURE — G8420 CALC BMI NORM PARAMETERS: HCPCS | Performed by: INTERNAL MEDICINE

## 2023-12-11 PROCEDURE — 3017F COLORECTAL CA SCREEN DOC REV: CPT | Performed by: INTERNAL MEDICINE

## 2023-12-11 PROCEDURE — G8427 DOCREV CUR MEDS BY ELIG CLIN: HCPCS | Performed by: INTERNAL MEDICINE

## 2024-04-08 ENCOUNTER — OFFICE VISIT (OUTPATIENT)
Dept: PRIMARY CARE CLINIC | Age: 74
End: 2024-04-08
Payer: MEDICARE

## 2024-04-08 VITALS
BODY MASS INDEX: 23.38 KG/M2 | HEIGHT: 75 IN | SYSTOLIC BLOOD PRESSURE: 122 MMHG | WEIGHT: 188 LBS | DIASTOLIC BLOOD PRESSURE: 68 MMHG | HEART RATE: 64 BPM | TEMPERATURE: 98.2 F | OXYGEN SATURATION: 96 %

## 2024-04-08 DIAGNOSIS — Z13.220 SCREENING FOR HYPERLIPIDEMIA: ICD-10-CM

## 2024-04-08 DIAGNOSIS — J44.9 CHRONIC OBSTRUCTIVE PULMONARY DISEASE, UNSPECIFIED COPD TYPE (HCC): ICD-10-CM

## 2024-04-08 DIAGNOSIS — Z00.00 MEDICARE ANNUAL WELLNESS VISIT, SUBSEQUENT: Primary | ICD-10-CM

## 2024-04-08 DIAGNOSIS — Z86.010 HISTORY OF COLON POLYPS: ICD-10-CM

## 2024-04-08 DIAGNOSIS — Z12.5 PROSTATE CANCER SCREENING: ICD-10-CM

## 2024-04-08 DIAGNOSIS — C67.8 MALIGNANT NEOPLASM OF OVERLAPPING SITES OF BLADDER (HCC): ICD-10-CM

## 2024-04-08 PROBLEM — S32.020D COMPRESSION FRACTURE OF L2 VERTEBRA WITH ROUTINE HEALING: Status: RESOLVED | Noted: 2022-04-26 | Resolved: 2024-04-08

## 2024-04-08 PROBLEM — F17.210 CIGARETTE NICOTINE DEPENDENCE WITHOUT COMPLICATION: Status: RESOLVED | Noted: 2019-06-27 | Resolved: 2024-04-08

## 2024-04-08 PROBLEM — S32.010D COMPRESSION FRACTURE OF L1 VERTEBRA WITH ROUTINE HEALING: Status: RESOLVED | Noted: 2022-04-26 | Resolved: 2024-04-08

## 2024-04-08 PROCEDURE — 3017F COLORECTAL CA SCREEN DOC REV: CPT | Performed by: INTERNAL MEDICINE

## 2024-04-08 PROCEDURE — 1123F ACP DISCUSS/DSCN MKR DOCD: CPT | Performed by: INTERNAL MEDICINE

## 2024-04-08 PROCEDURE — G0439 PPPS, SUBSEQ VISIT: HCPCS | Performed by: INTERNAL MEDICINE

## 2024-04-08 SDOH — ECONOMIC STABILITY: FOOD INSECURITY: WITHIN THE PAST 12 MONTHS, YOU WORRIED THAT YOUR FOOD WOULD RUN OUT BEFORE YOU GOT MONEY TO BUY MORE.: NEVER TRUE

## 2024-04-08 SDOH — ECONOMIC STABILITY: FOOD INSECURITY: WITHIN THE PAST 12 MONTHS, THE FOOD YOU BOUGHT JUST DIDN'T LAST AND YOU DIDN'T HAVE MONEY TO GET MORE.: NEVER TRUE

## 2024-04-08 SDOH — ECONOMIC STABILITY: INCOME INSECURITY: HOW HARD IS IT FOR YOU TO PAY FOR THE VERY BASICS LIKE FOOD, HOUSING, MEDICAL CARE, AND HEATING?: NOT VERY HARD

## 2024-04-08 ASSESSMENT — PATIENT HEALTH QUESTIONNAIRE - PHQ9
SUM OF ALL RESPONSES TO PHQ QUESTIONS 1-9: 0
1. LITTLE INTEREST OR PLEASURE IN DOING THINGS: NOT AT ALL
2. FEELING DOWN, DEPRESSED OR HOPELESS: NOT AT ALL
SUM OF ALL RESPONSES TO PHQ9 QUESTIONS 1 & 2: 0
SUM OF ALL RESPONSES TO PHQ QUESTIONS 1-9: 0

## 2024-04-08 NOTE — PROGRESS NOTES
Interventions:                 Dentist Screen:  Have you seen the dentist within the past year?: (!) No (no, past 25 years)    Intervention:  Patient has upper and lower dentures     Vision Screen:  Do you have difficulty driving, watching TV, or doing any of your daily activities because of your eyesight?: No  Have you had an eye exam within the past year?: (!) No  No results found.  -Patient says he will schedule an appointment with his optometrist if it is not already scheduled.    Interventions:   Patient encouraged to make appointment with their eye specialist  See AVS for additional education material    Safety:  Do you have non-slip mats or non-slip surfaces or shower bars or grab bars in your shower or bathtub?: (!) No  Interventions:  See AVS for additional education material     Advanced Directives:  Do you have a Living Will?: (!) No    Intervention:  has NO advanced directive - information provided       Lung Cancer Screening:  Patient has had CT Lung scans for cancer staging with Dr Garner/Oncology      CV Risk Counseling:  Patient was asked about his current diet and exercise habits, and personalized advice was provided regarding recommended lifestyle changes. Patient's individual cardiovascular disease risk factors, including advanced age (> 55 for men, > 65 for women), male gender, and smoking/tobacco exposure, were discussed, as well as the likely benefits of lifestyle changes. Based upon patient's motivation to change his behavior, the following plan was agreed upon to work toward lowering cardiovascular disease risk: low saturated fat, low cholesterol diet and increase physical activity, as tolerated.  Aspirin use for primary prevention of cardiovascular disease for men 45-79 and women 55-79: Not indicated. Educational materials for lifestyle changes were provided. Patient will follow-up in 1 year(s) with PCP. Provider spent 5-7 min minutes counseling patient.            Objective   Vitals:

## 2024-04-08 NOTE — PATIENT INSTRUCTIONS
the toothbrush. Their hands and fingers may be stiff, painful, or weak. If this is the case, you can:  Offer an electric toothbrush.  Enlarge the handle of a non-electric toothbrush by wrapping a sponge, an elastic bandage, or adhesive tape around it.  Push the toothbrush handle through a ball made of rubber or soft foam.  Make the handle longer and thicker by taping Popsicle sticks or tongue depressors to it.  You may also be able to buy special toothbrushes, toothpaste dispensers, and floss holders.  Your doctor may recommend a soft-bristle toothbrush if the person you care for bleeds easily. Bleeding can happen because of a health problem or from certain medicines.  A toothpaste for sensitive teeth may help if the person you care for has sensitive teeth.  How do you brush and floss someone's teeth?  If the person you are caring for has a hard time cleaning their teeth on their own, you may need to brush and floss their teeth for them. It may be easiest to have the person sit and face away from you, and to sit or stand behind them. That way you can steady their head against your arm as you reach around to floss and brush their teeth. Choose a place that has good lighting and is comfortable for both of you.  Before you begin, gather your supplies. You will need gloves, floss, a toothbrush, and a container to hold water if you are not near a sink. Wash and dry your hands well and put on gloves. Start by flossing:  Gently work a piece of floss between each of the teeth toward the gums. A plastic flossing tool may make this easier, and they are available at most [facility].  Curve the floss around each tooth into a U-shape and gently slide it under the gum line.  Move the floss firmly up and down several times to scrape off the plaque.  After you've finished flossing, throw away the used floss and begin brushing:  Wet the brush and apply toothpaste.  Place the brush at a 45-degree angle where the teeth meet the gums.

## 2024-06-12 ENCOUNTER — HOSPITAL ENCOUNTER (OUTPATIENT)
Dept: CT IMAGING | Age: 74
Discharge: HOME | End: 2024-06-12
Payer: MEDICARE

## 2024-06-12 ENCOUNTER — HOSPITAL ENCOUNTER (OUTPATIENT)
Dept: INFUSION THERAPY | Age: 74
Discharge: HOME | End: 2024-06-12
Payer: MEDICARE

## 2024-06-12 DIAGNOSIS — C67.8 MALIGNANT NEOPLASM OF OVERLAPPING SITES OF BLADDER (HCC): ICD-10-CM

## 2024-06-12 DIAGNOSIS — Z45.2 ENCOUNTER FOR CARE RELATED TO PORT-A-CATH: ICD-10-CM

## 2024-06-12 DIAGNOSIS — Z95.828 PORT-A-CATH IN PLACE: Primary | ICD-10-CM

## 2024-06-12 LAB
ALBUMIN SERPL-MCNC: 4.4 G/DL (ref 3.5–5.2)
ALP SERPL-CCNC: 103 U/L (ref 40–130)
ALT SERPL-CCNC: 9 U/L (ref 5–41)
ANION GAP SERPL CALCULATED.3IONS-SCNC: 18 MMOL/L (ref 7–19)
AST SERPL-CCNC: 15 U/L (ref 5–40)
BASOPHILS # BLD: 0.06 K/UL (ref 0.01–0.08)
BASOPHILS NFR BLD: 1.1 % (ref 0.1–1.2)
BILIRUB SERPL-MCNC: 1.3 MG/DL (ref 0.2–1.2)
BUN SERPL-MCNC: 15 MG/DL (ref 8–23)
CALCIUM SERPL-MCNC: 9.1 MG/DL (ref 8.8–10.2)
CHLORIDE SERPL-SCNC: 100 MMOL/L (ref 98–111)
CO2 SERPL-SCNC: 21 MMOL/L (ref 22–29)
CREAT SERPL-MCNC: 1 MG/DL (ref 0.5–1.2)
EOSINOPHIL # BLD: 0.15 K/UL (ref 0.04–0.54)
EOSINOPHIL NFR BLD: 2.8 % (ref 0.7–7)
ERYTHROCYTE [DISTWIDTH] IN BLOOD BY AUTOMATED COUNT: 12.9 % (ref 11.6–14.4)
GLUCOSE SERPL-MCNC: 77 MG/DL (ref 74–109)
HCT VFR BLD AUTO: 40 % (ref 40.1–51)
HGB BLD-MCNC: 13.9 G/DL (ref 13.7–17.5)
LYMPHOCYTES # BLD: 1.04 K/UL (ref 1.18–3.74)
LYMPHOCYTES NFR BLD: 19.3 % (ref 19.3–53.1)
MCH RBC QN AUTO: 31.9 PG (ref 25.7–32.2)
MCHC RBC AUTO-ENTMCNC: 34.8 G/DL (ref 32.3–36.5)
MCV RBC AUTO: 91.7 FL (ref 79–92.2)
MONOCYTES # BLD: 0.7 K/UL (ref 0.24–0.82)
MONOCYTES NFR BLD: 13 % (ref 4.7–12.5)
NEUTROPHILS # BLD: 3.4 K/UL (ref 1.56–6.13)
NEUTS SEG NFR BLD: 63.2 % (ref 34–71.1)
PLATELET # BLD AUTO: 142 K/UL (ref 163–337)
PMV BLD AUTO: 10.7 FL (ref 7.4–10.4)
POTASSIUM SERPL-SCNC: 4.5 MMOL/L (ref 3.5–5)
PROT SERPL-MCNC: 6.9 G/DL (ref 6.6–8.7)
RBC # BLD AUTO: 4.36 M/UL (ref 4.63–6.08)
SODIUM SERPL-SCNC: 139 MMOL/L (ref 136–145)
VIT B12 SERPL-MCNC: 304 PG/ML (ref 211–946)
WBC # BLD AUTO: 5.38 K/UL (ref 4.23–9.07)

## 2024-06-12 PROCEDURE — 74177 CT ABD & PELVIS W/CONTRAST: CPT

## 2024-06-12 PROCEDURE — 36415 COLL VENOUS BLD VENIPUNCTURE: CPT

## 2024-06-12 PROCEDURE — 6360000002 HC RX W HCPCS: Performed by: INTERNAL MEDICINE

## 2024-06-12 PROCEDURE — 96523 IRRIG DRUG DELIVERY DEVICE: CPT

## 2024-06-12 PROCEDURE — 2580000003 HC RX 258: Performed by: INTERNAL MEDICINE

## 2024-06-12 PROCEDURE — 85025 COMPLETE CBC W/AUTO DIFF WBC: CPT

## 2024-06-12 PROCEDURE — 71260 CT THORAX DX C+: CPT

## 2024-06-12 PROCEDURE — 6360000004 HC RX CONTRAST MEDICATION: Performed by: INTERNAL MEDICINE

## 2024-06-12 RX ORDER — SODIUM CHLORIDE 0.9 % (FLUSH) 0.9 %
5-40 SYRINGE (ML) INJECTION PRN
Status: DISCONTINUED | OUTPATIENT
Start: 2024-06-12 | End: 2024-06-13 | Stop reason: HOSPADM

## 2024-06-12 RX ORDER — HEPARIN 100 UNIT/ML
500 SYRINGE INTRAVENOUS PRN
Status: DISCONTINUED | OUTPATIENT
Start: 2024-06-12 | End: 2024-06-13 | Stop reason: HOSPADM

## 2024-06-12 RX ADMIN — HEPARIN 500 UNITS: 100 SYRINGE at 09:22

## 2024-06-12 RX ADMIN — SODIUM CHLORIDE, PRESERVATIVE FREE 10 ML: 5 INJECTION INTRAVENOUS at 09:22

## 2024-06-12 RX ADMIN — IOPAMIDOL 75 ML: 755 INJECTION, SOLUTION INTRAVENOUS at 09:17

## 2024-06-14 ENCOUNTER — TELEPHONE (OUTPATIENT)
Dept: HEMATOLOGY | Age: 74
End: 2024-06-14

## 2024-06-14 NOTE — TELEPHONE ENCOUNTER
Called Patient and reminded patient of their appointment on 06/19/2024 and patient confirmed they would be here

## 2024-06-18 NOTE — PROGRESS NOTES
MEDICAL ONCOLOGY PROGRESS NOTE    Pt Name: Edgar Day  MRN: [id number]  YOB: 1950  Date of evaluation: 6/19/2024    HISTORY OF PRESENT ILLNESS:    Reason for MD visit-surveillance follow-up visit  The patient is s/p neoadjuvant chemotherapy with dose dense MVAC for his diagnosis of muscle invasive bladder cancer.  He also underwent surgery, cystoprostatectomy by Dr. Starks.  Patient has no new complaints.  He is here for a surveillance follow-up visit.  The patient denies any new complaints since last visit.  Denies any abdominal pain.  Denies any respiratory symptoms.  Denies any bone pain.  He is asking for me to sign supplies for his urostomy.    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 Monie Starks at [facility]    Cancer History  Edgar Day was first seen by me on 8/10/2022.  He was referred by urology, [facility] for diagnosis of muscle invasive bladder cancer.  Patient presented with complaints of hematuria in June 2022.  Imaging studies showed sessile mass involving the right side of his bladder.  No pelvic respiratory adenopathy.  No evidence of distant metastasis.  Cystoscopy was performed for transurethral resection of bladder tumor.  Biopsy consistent with muscle invasive disease.  Therefore, he was referred to me and also urology at [place].  The patient denies any major comorbidities.  He is quite functional.  Patient is a current smoker.  5/6/22 CT abd/pelvis: Sessile appearing mass in the base of the bladder on the right, measuring approximately 3.7 x 2.7 cm, this is compatible with bladder neoplasm until proven otherwise. Consider follow-up with direct visualization and biopsy. The mass partially covers

## 2024-06-19 ENCOUNTER — OFFICE VISIT (OUTPATIENT)
Dept: HEMATOLOGY | Age: 74
End: 2024-06-19
Payer: MEDICARE

## 2024-06-19 ENCOUNTER — HOSPITAL ENCOUNTER (OUTPATIENT)
Dept: INFUSION THERAPY | Age: 74
Discharge: HOME | End: 2024-06-19
Payer: MEDICARE

## 2024-06-19 ENCOUNTER — TELEPHONE (OUTPATIENT)
Dept: GASTROENTEROLOGY | Age: 74
End: 2024-06-19

## 2024-06-19 VITALS
BODY MASS INDEX: 23.45 KG/M2 | WEIGHT: 188.6 LBS | SYSTOLIC BLOOD PRESSURE: 142 MMHG | TEMPERATURE: 98.1 F | OXYGEN SATURATION: 96 % | HEART RATE: 78 BPM | DIASTOLIC BLOOD PRESSURE: 82 MMHG | HEIGHT: 75 IN

## 2024-06-19 DIAGNOSIS — C67.8 MALIGNANT NEOPLASM OF OVERLAPPING SITES OF BLADDER (HCC): Primary | ICD-10-CM

## 2024-06-19 DIAGNOSIS — Z85.51 ENCOUNTER FOR FOLLOW-UP SURVEILLANCE OF BLADDER CANCER: ICD-10-CM

## 2024-06-19 DIAGNOSIS — C67.8 MALIGNANT NEOPLASM OF OVERLAPPING SITES OF BLADDER (HCC): ICD-10-CM

## 2024-06-19 DIAGNOSIS — G62.0 CHEMOTHERAPY-INDUCED NEUROPATHY (HCC): ICD-10-CM

## 2024-06-19 DIAGNOSIS — T45.1X5A CHEMOTHERAPY-INDUCED NEUROPATHY (HCC): ICD-10-CM

## 2024-06-19 DIAGNOSIS — Z08 ENCOUNTER FOR FOLLOW-UP SURVEILLANCE OF BLADDER CANCER: ICD-10-CM

## 2024-06-19 DIAGNOSIS — Z71.89 CARE PLAN DISCUSSED WITH PATIENT: ICD-10-CM

## 2024-06-19 PROCEDURE — 99213 OFFICE O/P EST LOW 20 MIN: CPT

## 2024-06-19 PROCEDURE — 3017F COLORECTAL CA SCREEN DOC REV: CPT | Performed by: INTERNAL MEDICINE

## 2024-06-19 PROCEDURE — G8427 DOCREV CUR MEDS BY ELIG CLIN: HCPCS | Performed by: INTERNAL MEDICINE

## 2024-06-19 PROCEDURE — 1036F TOBACCO NON-USER: CPT | Performed by: INTERNAL MEDICINE

## 2024-06-19 PROCEDURE — G2211 COMPLEX E/M VISIT ADD ON: HCPCS | Performed by: INTERNAL MEDICINE

## 2024-06-19 PROCEDURE — 1123F ACP DISCUSS/DSCN MKR DOCD: CPT | Performed by: INTERNAL MEDICINE

## 2024-06-19 PROCEDURE — G8420 CALC BMI NORM PARAMETERS: HCPCS | Performed by: INTERNAL MEDICINE

## 2024-06-19 PROCEDURE — 99213 OFFICE O/P EST LOW 20 MIN: CPT | Performed by: INTERNAL MEDICINE

## 2024-06-19 NOTE — TELEPHONE ENCOUNTER
Patient called and stated that he has a colon screening on 7/9/24 at 7:30a with [facility]. Edgar is needing to cancel and reschedule this colon procedure. Please contact patient to advise.     Thank you

## 2024-07-01 ENCOUNTER — OFFICE VISIT (OUTPATIENT)
Dept: PRIMARY CARE CLINIC | Age: 74
End: 2024-07-01
Payer: MEDICARE

## 2024-07-01 VITALS
WEIGHT: 189 LBS | OXYGEN SATURATION: 96 % | TEMPERATURE: 97.4 F | HEIGHT: 75 IN | DIASTOLIC BLOOD PRESSURE: 78 MMHG | BODY MASS INDEX: 23.5 KG/M2 | SYSTOLIC BLOOD PRESSURE: 128 MMHG | HEART RATE: 71 BPM

## 2024-07-01 DIAGNOSIS — C67.8 MALIGNANT NEOPLASM OF OVERLAPPING SITES OF BLADDER (HCC): ICD-10-CM

## 2024-07-01 DIAGNOSIS — Z93.6 PRESENCE OF UROSTOMY (HCC): Primary | ICD-10-CM

## 2024-07-01 PROCEDURE — G8420 CALC BMI NORM PARAMETERS: HCPCS | Performed by: INTERNAL MEDICINE

## 2024-07-01 PROCEDURE — 99213 OFFICE O/P EST LOW 20 MIN: CPT | Performed by: INTERNAL MEDICINE

## 2024-07-01 PROCEDURE — G8427 DOCREV CUR MEDS BY ELIG CLIN: HCPCS | Performed by: INTERNAL MEDICINE

## 2024-07-01 PROCEDURE — 1123F ACP DISCUSS/DSCN MKR DOCD: CPT | Performed by: INTERNAL MEDICINE

## 2024-07-01 PROCEDURE — 3017F COLORECTAL CA SCREEN DOC REV: CPT | Performed by: INTERNAL MEDICINE

## 2024-07-01 PROCEDURE — 1036F TOBACCO NON-USER: CPT | Performed by: INTERNAL MEDICINE

## 2024-07-01 ASSESSMENT — ENCOUNTER SYMPTOMS
SORE THROAT: 0
DIARRHEA: 0
EYE REDNESS: 0
VOICE CHANGE: 0
RHINORRHEA: 0
COLOR CHANGE: 0
SINUS PRESSURE: 0
CHEST TIGHTNESS: 0
VOMITING: 0
WHEEZING: 0
ABDOMINAL PAIN: 0
EYE DISCHARGE: 0
COUGH: 0
EYE PAIN: 0
BLOOD IN STOOL: 0
SHORTNESS OF BREATH: 0

## 2024-07-01 NOTE — PROGRESS NOTES
Edgar Day is a 74 y.o. male who presents today for   Chief Complaint   Patient presents with    Other     Need prescriptions. Order need to say ostomy       HPI  75 y/o WM with history of bladder cancer and urostomy here for face to face visit for renewal order for his urostomy supplies for the next year. His urologist is in [place], [place] but patient thought PCP could also help order these supplies. He needs orders for [employer] adapt ceraring 2\" barrier rings that has to be changed to be changed out every 4 days by patient, [employer] no sting skin prep wipes that he has to use every time he changes out his urostomy bag. Patient gets these supplies from [employer] and he is almost out of his supplies.     Review of Systems   Constitutional:  Negative for appetite change, chills, fatigue and fever.   HENT:  Negative for ear pain, rhinorrhea, sinus pressure, sore throat and voice change.    Eyes:  Negative for pain, discharge and redness.   Respiratory:  Negative for cough, chest tightness, shortness of breath and wheezing.    Cardiovascular:  Negative for chest pain and palpitations.   Gastrointestinal:  Negative for abdominal pain, blood in stool, diarrhea and vomiting.   Endocrine: Negative for cold intolerance, heat intolerance and polydipsia.   Genitourinary:  Negative for flank pain and hematuria.        See HPI   Musculoskeletal:  Negative for arthralgias, myalgias, neck pain and neck stiffness.   Skin:  Negative for color change and rash.   Neurological:  Negative for dizziness, tremors, syncope, speech difficulty, weakness, numbness and headaches.   Hematological:  Negative for adenopathy. Does not bruise/bleed easily.   Psychiatric/Behavioral:  Negative for confusion, dysphoric mood and sleep disturbance. The patient is not nervous/anxious.    All other systems reviewed and are negative.      Past Medical History:   Diagnosis Date    Bladder cancer (HCC) 06/2022    Cigarette

## 2024-12-06 ENCOUNTER — HOSPITAL ENCOUNTER (OUTPATIENT)
Dept: INFUSION THERAPY | Age: 74
Discharge: HOME | End: 2024-12-06
Payer: MEDICARE

## 2024-12-06 ENCOUNTER — HOSPITAL ENCOUNTER (OUTPATIENT)
Dept: CT IMAGING | Age: 74
Discharge: HOME | End: 2024-12-06
Attending: INTERNAL MEDICINE
Payer: MEDICARE

## 2024-12-06 DIAGNOSIS — C67.8 MALIGNANT NEOPLASM OF OVERLAPPING SITES OF BLADDER (HCC): ICD-10-CM

## 2024-12-06 DIAGNOSIS — Z95.828 PORT-A-CATH IN PLACE: Primary | ICD-10-CM

## 2024-12-06 DIAGNOSIS — Z45.2 ENCOUNTER FOR CARE RELATED TO PORT-A-CATH: ICD-10-CM

## 2024-12-06 LAB
CREAT SERPL-MCNC: 0.8 MG/DL (ref 0.3–1.3)
PERFORMED ON: NORMAL

## 2024-12-06 PROCEDURE — 6360000002 HC RX W HCPCS: Performed by: INTERNAL MEDICINE

## 2024-12-06 PROCEDURE — 96523 IRRIG DRUG DELIVERY DEVICE: CPT

## 2024-12-06 PROCEDURE — 82565 ASSAY OF CREATININE: CPT

## 2024-12-06 PROCEDURE — 74177 CT ABD & PELVIS W/CONTRAST: CPT

## 2024-12-06 PROCEDURE — 6360000004 HC RX CONTRAST MEDICATION: Performed by: INTERNAL MEDICINE

## 2024-12-06 PROCEDURE — 71260 CT THORAX DX C+: CPT

## 2024-12-06 PROCEDURE — 2580000003 HC RX 258: Performed by: INTERNAL MEDICINE

## 2024-12-06 RX ORDER — SODIUM CHLORIDE 0.9 % (FLUSH) 0.9 %
5-40 SYRINGE (ML) INJECTION PRN
Status: DISCONTINUED | OUTPATIENT
Start: 2024-12-06 | End: 2024-12-07 | Stop reason: HOSPADM

## 2024-12-06 RX ORDER — IOPAMIDOL 755 MG/ML
70 INJECTION, SOLUTION INTRAVASCULAR
Status: COMPLETED | OUTPATIENT
Start: 2024-12-06 | End: 2024-12-06

## 2024-12-06 RX ORDER — HEPARIN 100 UNIT/ML
500 SYRINGE INTRAVENOUS PRN
Status: DISCONTINUED | OUTPATIENT
Start: 2024-12-06 | End: 2024-12-07 | Stop reason: HOSPADM

## 2024-12-06 RX ADMIN — IOPAMIDOL 70 ML: 755 INJECTION, SOLUTION INTRAVENOUS at 09:40

## 2024-12-06 RX ADMIN — SODIUM CHLORIDE, PRESERVATIVE FREE 10 ML: 5 INJECTION INTRAVENOUS at 10:12

## 2024-12-06 RX ADMIN — HEPARIN 500 UNITS: 100 SYRINGE at 10:13

## 2024-12-17 ENCOUNTER — TELEPHONE (OUTPATIENT)
Dept: HEMATOLOGY | Age: 74
End: 2024-12-17

## 2024-12-17 NOTE — TELEPHONE ENCOUNTER

## 2024-12-18 DIAGNOSIS — Z85.51 ENCOUNTER FOR FOLLOW-UP SURVEILLANCE OF BLADDER CANCER: ICD-10-CM

## 2024-12-18 DIAGNOSIS — Z08 ENCOUNTER FOR FOLLOW-UP SURVEILLANCE OF BLADDER CANCER: ICD-10-CM

## 2024-12-18 DIAGNOSIS — E53.8 VITAMIN B 12 DEFICIENCY: ICD-10-CM

## 2024-12-18 DIAGNOSIS — C67.8 MALIGNANT NEOPLASM OF OVERLAPPING SITES OF BLADDER (HCC): Primary | ICD-10-CM

## 2024-12-18 NOTE — PROGRESS NOTES
MEDICAL ONCOLOGY PROGRESS NOTE                                                          Edgar Day   1950  12/19/2024     Chief Complaint   Patient presents with    Follow-up     Malignant neoplasm of overlapping sites of bladder        HISTORY OF PRESENT ILLNESS:    Reason for MD visit-surveillance follow-up visit  History of Present Illness  The patient is a very pleasant 74-year-old male who presents today for follow-up of bladder cancer. He was diagnosed with bladder cancer in 2022 and underwent neoadjuvant chemotherapy, dose-dense MVAC. He underwent a cystoprostatectomy at [facility] by Urology on 12/15/2022. He presents today for continued clinical and image surveillance of his cancer. He had CT scans of the chest, abdomen, and pelvis performed, and he is here to discuss the results and further recommendations.    He reports no new health issues. His urostomy bag remains clear, with no evidence of hematuria. He is not experiencing any abdominal discomfort, nausea, vomiting, or diarrhea. However, he has noted a weight loss, with his previous weight being 186 pounds and his current weight at 176 pounds. He attributes this weight loss to the physical demands of caring for his wife, who is currently ill.        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 Monie Starks at [facility]    Cancer History  Edgar Day was first seen by me on 8/10/2022.  He was referred by urology, [facility] for diagnosis of muscle invasive bladder cancer.  Patient presented with complaints of hematuria in June 2022.  Imaging studies showed sessile mass involving the right side of his bladder.  No pelvic respiratory adenopathy.  No evidence

## 2024-12-19 ENCOUNTER — HOSPITAL ENCOUNTER (OUTPATIENT)
Dept: INFUSION THERAPY | Age: 74
Discharge: HOME | End: 2024-12-19
Payer: MEDICARE

## 2024-12-19 ENCOUNTER — OFFICE VISIT (OUTPATIENT)
Dept: HEMATOLOGY | Age: 74
End: 2024-12-19
Payer: MEDICARE

## 2024-12-19 VITALS
TEMPERATURE: 98 F | OXYGEN SATURATION: 98 % | HEIGHT: 75 IN | DIASTOLIC BLOOD PRESSURE: 78 MMHG | BODY MASS INDEX: 21.95 KG/M2 | WEIGHT: 176.5 LBS | SYSTOLIC BLOOD PRESSURE: 136 MMHG | HEART RATE: 78 BPM

## 2024-12-19 DIAGNOSIS — E53.8 VITAMIN B 12 DEFICIENCY: ICD-10-CM

## 2024-12-19 DIAGNOSIS — Z45.2 ENCOUNTER FOR CARE RELATED TO PORT-A-CATH: ICD-10-CM

## 2024-12-19 DIAGNOSIS — Z95.828 PORT-A-CATH IN PLACE: Primary | ICD-10-CM

## 2024-12-19 DIAGNOSIS — C67.8 MALIGNANT NEOPLASM OF OVERLAPPING SITES OF BLADDER (HCC): ICD-10-CM

## 2024-12-19 DIAGNOSIS — Z85.51 ENCOUNTER FOR FOLLOW-UP SURVEILLANCE OF BLADDER CANCER: ICD-10-CM

## 2024-12-19 DIAGNOSIS — Z08 ENCOUNTER FOR FOLLOW-UP SURVEILLANCE OF BLADDER CANCER: ICD-10-CM

## 2024-12-19 LAB
ALBUMIN SERPL-MCNC: 4.4 G/DL (ref 3.5–5.2)
ALP SERPL-CCNC: 102 U/L (ref 40–129)
ALT SERPL-CCNC: 10 U/L (ref 5–41)
ANION GAP SERPL CALCULATED.3IONS-SCNC: 9 MMOL/L (ref 7–19)
AST SERPL-CCNC: 19 U/L (ref 5–40)
BASOPHILS # BLD: 0.06 K/UL (ref 0.01–0.08)
BASOPHILS NFR BLD: 1.1 % (ref 0.1–1.2)
BILIRUB SERPL-MCNC: 1.3 MG/DL (ref 0–1.2)
BUN SERPL-MCNC: 18 MG/DL (ref 8–23)
CALCIUM SERPL-MCNC: 9.3 MG/DL (ref 8.8–10.2)
CHLORIDE SERPL-SCNC: 104 MMOL/L (ref 98–107)
CO2 SERPL-SCNC: 26 MMOL/L (ref 22–29)
CREAT SERPL-MCNC: 0.9 MG/DL (ref 0.7–1.2)
EOSINOPHIL # BLD: 0.12 K/UL (ref 0.04–0.54)
EOSINOPHIL NFR BLD: 2.3 % (ref 0.7–7)
ERYTHROCYTE [DISTWIDTH] IN BLOOD BY AUTOMATED COUNT: 12.8 % (ref 11.6–14.4)
GLUCOSE SERPL-MCNC: 92 MG/DL (ref 70–99)
HCT VFR BLD AUTO: 42 % (ref 40.1–51)
HGB BLD-MCNC: 14.4 G/DL (ref 13.7–17.5)
LYMPHOCYTES # BLD: 0.9 K/UL (ref 1.18–3.74)
LYMPHOCYTES NFR BLD: 17.1 % (ref 19.3–53.1)
MCH RBC QN AUTO: 32 PG (ref 25.7–32.2)
MCHC RBC AUTO-ENTMCNC: 34.3 G/DL (ref 32.3–36.5)
MCV RBC AUTO: 93.3 FL (ref 79–92.2)
MONOCYTES # BLD: 0.61 K/UL (ref 0.24–0.82)
MONOCYTES NFR BLD: 11.6 % (ref 4.7–12.5)
NEUTROPHILS # BLD: 3.55 K/UL (ref 1.56–6.13)
NEUTS SEG NFR BLD: 67.7 % (ref 34–71.1)
PLATELET # BLD AUTO: 166 K/UL (ref 163–337)
PMV BLD AUTO: 11 FL (ref 7.4–10.4)
POTASSIUM SERPL-SCNC: 4.3 MMOL/L (ref 3.5–5.1)
PROT SERPL-MCNC: 7.7 G/DL (ref 6.4–8.3)
RBC # BLD AUTO: 4.5 M/UL (ref 4.63–6.08)
SODIUM SERPL-SCNC: 139 MMOL/L (ref 136–145)
VIT B12 SERPL-MCNC: 253 PG/ML (ref 232–1245)
WBC # BLD AUTO: 5.25 K/UL (ref 4.23–9.07)

## 2024-12-19 PROCEDURE — 85025 COMPLETE CBC W/AUTO DIFF WBC: CPT

## 2024-12-19 PROCEDURE — 6360000002 HC RX W HCPCS

## 2024-12-19 PROCEDURE — 36591 DRAW BLOOD OFF VENOUS DEVICE: CPT

## 2024-12-19 PROCEDURE — 96523 IRRIG DRUG DELIVERY DEVICE: CPT

## 2024-12-19 PROCEDURE — 2500000003 HC RX 250 WO HCPCS

## 2024-12-19 PROCEDURE — 80053 COMPREHEN METABOLIC PANEL: CPT

## 2024-12-19 PROCEDURE — 36415 COLL VENOUS BLD VENIPUNCTURE: CPT

## 2024-12-19 RX ORDER — SODIUM CHLORIDE 9 MG/ML
25 INJECTION, SOLUTION INTRAVENOUS PRN
OUTPATIENT
Start: 2024-12-19

## 2024-12-19 RX ORDER — HEPARIN 100 UNIT/ML
500 SYRINGE INTRAVENOUS PRN
Status: DISCONTINUED | OUTPATIENT
Start: 2024-12-19 | End: 2024-12-20 | Stop reason: HOSPADM

## 2024-12-19 RX ORDER — HEPARIN 100 UNIT/ML
500 SYRINGE INTRAVENOUS PRN
OUTPATIENT
Start: 2024-12-19

## 2024-12-19 RX ORDER — HEPARIN 100 UNIT/ML
500 SYRINGE INTRAVENOUS PRN
Status: CANCELLED | OUTPATIENT
Start: 2024-12-19

## 2024-12-19 RX ORDER — SODIUM CHLORIDE 0.9 % (FLUSH) 0.9 %
5-40 SYRINGE (ML) INJECTION PRN
OUTPATIENT
Start: 2024-12-19

## 2024-12-19 RX ORDER — SODIUM CHLORIDE 0.9 % (FLUSH) 0.9 %
5-40 SYRINGE (ML) INJECTION PRN
Status: DISCONTINUED | OUTPATIENT
Start: 2024-12-19 | End: 2024-12-20 | Stop reason: HOSPADM

## 2024-12-19 RX ORDER — SODIUM CHLORIDE 9 MG/ML
25 INJECTION, SOLUTION INTRAVENOUS PRN
Status: CANCELLED | OUTPATIENT
Start: 2024-12-19

## 2024-12-19 RX ORDER — SODIUM CHLORIDE 0.9 % (FLUSH) 0.9 %
5-40 SYRINGE (ML) INJECTION PRN
Status: CANCELLED | OUTPATIENT
Start: 2024-12-19

## 2024-12-19 RX ADMIN — SODIUM CHLORIDE, PRESERVATIVE FREE 10 ML: 5 INJECTION INTRAVENOUS at 08:51

## 2024-12-19 RX ADMIN — HEPARIN 500 UNITS: 100 SYRINGE at 08:52

## 2025-04-16 SDOH — ECONOMIC STABILITY: FOOD INSECURITY: WITHIN THE PAST 12 MONTHS, YOU WORRIED THAT YOUR FOOD WOULD RUN OUT BEFORE YOU GOT MONEY TO BUY MORE.: NEVER TRUE

## 2025-04-16 SDOH — ECONOMIC STABILITY: FOOD INSECURITY: WITHIN THE PAST 12 MONTHS, THE FOOD YOU BOUGHT JUST DIDN'T LAST AND YOU DIDN'T HAVE MONEY TO GET MORE.: NEVER TRUE

## 2025-04-16 SDOH — ECONOMIC STABILITY: INCOME INSECURITY: IN THE LAST 12 MONTHS, WAS THERE A TIME WHEN YOU WERE NOT ABLE TO PAY THE MORTGAGE OR RENT ON TIME?: NO

## 2025-04-16 SDOH — HEALTH STABILITY: PHYSICAL HEALTH: ON AVERAGE, HOW MANY MINUTES DO YOU ENGAGE IN EXERCISE AT THIS LEVEL?: 60 MIN

## 2025-04-16 SDOH — HEALTH STABILITY: PHYSICAL HEALTH: ON AVERAGE, HOW MANY DAYS PER WEEK DO YOU ENGAGE IN MODERATE TO STRENUOUS EXERCISE (LIKE A BRISK WALK)?: 5 DAYS

## 2025-04-16 SDOH — ECONOMIC STABILITY: TRANSPORTATION INSECURITY
IN THE PAST 12 MONTHS, HAS LACK OF TRANSPORTATION KEPT YOU FROM MEETINGS, WORK, OR FROM GETTING THINGS NEEDED FOR DAILY LIVING?: NO

## 2025-04-16 SDOH — ECONOMIC STABILITY: TRANSPORTATION INSECURITY
IN THE PAST 12 MONTHS, HAS THE LACK OF TRANSPORTATION KEPT YOU FROM MEDICAL APPOINTMENTS OR FROM GETTING MEDICATIONS?: NO

## 2025-04-16 ASSESSMENT — PATIENT HEALTH QUESTIONNAIRE - PHQ9
SUM OF ALL RESPONSES TO PHQ QUESTIONS 1-9: 0
2. FEELING DOWN, DEPRESSED OR HOPELESS: NOT AT ALL
SUM OF ALL RESPONSES TO PHQ QUESTIONS 1-9: 0
1. LITTLE INTEREST OR PLEASURE IN DOING THINGS: NOT AT ALL

## 2025-04-16 ASSESSMENT — LIFESTYLE VARIABLES
HOW MANY STANDARD DRINKS CONTAINING ALCOHOL DO YOU HAVE ON A TYPICAL DAY: PATIENT DOES NOT DRINK
HOW OFTEN DO YOU HAVE SIX OR MORE DRINKS ON ONE OCCASION: 1
HOW OFTEN DO YOU HAVE A DRINK CONTAINING ALCOHOL: NEVER
HOW MANY STANDARD DRINKS CONTAINING ALCOHOL DO YOU HAVE ON A TYPICAL DAY: 0
HOW OFTEN DO YOU HAVE A DRINK CONTAINING ALCOHOL: 1

## 2025-04-17 ENCOUNTER — OFFICE VISIT (OUTPATIENT)
Dept: PRIMARY CARE CLINIC | Age: 75
End: 2025-04-17
Payer: MEDICARE

## 2025-04-17 VITALS
TEMPERATURE: 97.2 F | SYSTOLIC BLOOD PRESSURE: 132 MMHG | WEIGHT: 183 LBS | DIASTOLIC BLOOD PRESSURE: 76 MMHG | HEIGHT: 75 IN | HEART RATE: 75 BPM | OXYGEN SATURATION: 98 % | BODY MASS INDEX: 22.75 KG/M2

## 2025-04-17 DIAGNOSIS — Z13.220 SCREENING FOR HYPERLIPIDEMIA: ICD-10-CM

## 2025-04-17 DIAGNOSIS — J44.9 CHRONIC OBSTRUCTIVE PULMONARY DISEASE, UNSPECIFIED COPD TYPE (HCC): ICD-10-CM

## 2025-04-17 DIAGNOSIS — Z11.59 ENCOUNTER FOR HCV SCREENING TEST FOR LOW RISK PATIENT: ICD-10-CM

## 2025-04-17 DIAGNOSIS — Z93.6 PRESENCE OF UROSTOMY (HCC): ICD-10-CM

## 2025-04-17 DIAGNOSIS — Z00.00 MEDICARE ANNUAL WELLNESS VISIT, SUBSEQUENT: Primary | ICD-10-CM

## 2025-04-17 DIAGNOSIS — E53.8 VITAMIN B12 DEFICIENCY: ICD-10-CM

## 2025-04-17 DIAGNOSIS — Z12.5 SCREENING FOR PROSTATE CANCER: ICD-10-CM

## 2025-04-17 DIAGNOSIS — Z86.0101 PERSONAL HISTORY OF ADENOMATOUS AND SERRATED COLON POLYPS: ICD-10-CM

## 2025-04-17 DIAGNOSIS — C67.8 MALIGNANT NEOPLASM OF OVERLAPPING SITES OF BLADDER (HCC): ICD-10-CM

## 2025-04-17 PROCEDURE — G0439 PPPS, SUBSEQ VISIT: HCPCS | Performed by: INTERNAL MEDICINE

## 2025-04-17 PROCEDURE — 1123F ACP DISCUSS/DSCN MKR DOCD: CPT | Performed by: INTERNAL MEDICINE

## 2025-04-17 PROCEDURE — 1159F MED LIST DOCD IN RCRD: CPT | Performed by: INTERNAL MEDICINE

## 2025-04-17 NOTE — PROGRESS NOTES
Intelligence will be utilized during this visit to record and process the conversation to generate a clinical note. The patient (or guardian, if applicable) and other individuals in attendance at the appointment consented to the use of AI, including the recording.

## 2025-06-16 ENCOUNTER — TELEPHONE (OUTPATIENT)
Dept: HEMATOLOGY | Age: 75
End: 2025-06-16

## 2025-06-16 NOTE — TELEPHONE ENCOUNTER
I called patient and left detailed voicemail about their appointment on 06/19/2025. I made patient aware not to arrive any earlier than the appointment time and to come at the time of the follow up not the time of the lab appointment if it is different than the follow up appt time. I also made patient aware to eat and drink plenty of water to hydrate properly before coming to these appointments because this will make their lab draw much easier. Made patient aware that we are now located at the [facility] at [address] [place] [address]. Located between [facility] and the [facility].

## 2025-06-18 DIAGNOSIS — E53.8 VITAMIN B 12 DEFICIENCY: ICD-10-CM

## 2025-06-18 DIAGNOSIS — Z85.51 ENCOUNTER FOR FOLLOW-UP SURVEILLANCE OF BLADDER CANCER: ICD-10-CM

## 2025-06-18 DIAGNOSIS — Z08 ENCOUNTER FOR FOLLOW-UP SURVEILLANCE OF BLADDER CANCER: ICD-10-CM

## 2025-06-18 DIAGNOSIS — C67.8 MALIGNANT NEOPLASM OF OVERLAPPING SITES OF BLADDER (HCC): Primary | ICD-10-CM

## 2025-06-18 NOTE — PROGRESS NOTES
MEDICAL ONCOLOGY PROGRESS NOTE                                                          Edgar Day   1950  6/19/2025     Chief Complaint   Patient presents with    malignant neoplasm of overlapping sites of bladder        HISTORY OF PRESENT ILLNESS:    Reason for MD visit-surveillance follow-up visit  History of Present Illness  The patient is a very pleasant 75-year-old male who presents today for follow-up of bladder cancer. He was diagnosed with bladder cancer in 2022 and underwent neoadjuvant chemotherapy, dose-dense MVAC. He underwent a cystoprostatectomy at [facility] by Urology on 12/15/2022. He presents today for continued clinical and image surveillance of his cancer. He had CT scans of the chest, abdomen, and pelvis performed, and he is here to discuss the results and further recommendations.    The patient is a 75-year-old male with a diagnosis of muscle-invasive high-grade urothelial carcinoma of the bladder, clinical stage II, status post cystectomy. He is currently under clinical and radiologic surveillance. He is accompanied by his daughter.    He reports a general sense of well-being and has been engaging in physical exercise at the gym. His last scan was conducted in 01/2025. He is no longer under the care of Dr. Jama. He underwent surgery on 12/22/2024 and had scans on 12/24/2024.     His wife passed away in 03/2025, and he has been feeling slightly depressed since then. He occasionally experiences itching around the urostomy site, which he manages by rubbing the area. He is becoming more proficient in managing the urostomy bag independently. He has a port flush scheduled for today and wishes to retain the port.    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

## 2025-06-19 ENCOUNTER — OFFICE VISIT (OUTPATIENT)
Dept: HEMATOLOGY | Age: 75
End: 2025-06-19
Payer: MEDICARE

## 2025-06-19 ENCOUNTER — HOSPITAL ENCOUNTER (OUTPATIENT)
Dept: INFUSION THERAPY | Age: 75
Discharge: HOME | End: 2025-06-19
Payer: MEDICARE

## 2025-06-19 VITALS
WEIGHT: 182.4 LBS | HEART RATE: 57 BPM | SYSTOLIC BLOOD PRESSURE: 131 MMHG | BODY MASS INDEX: 22.8 KG/M2 | TEMPERATURE: 98.1 F | RESPIRATION RATE: 16 BRPM | OXYGEN SATURATION: 98 % | DIASTOLIC BLOOD PRESSURE: 61 MMHG

## 2025-06-19 DIAGNOSIS — Z08 ENCOUNTER FOR FOLLOW-UP SURVEILLANCE OF BLADDER CANCER: ICD-10-CM

## 2025-06-19 DIAGNOSIS — C67.8 MALIGNANT NEOPLASM OF OVERLAPPING SITES OF BLADDER (HCC): ICD-10-CM

## 2025-06-19 DIAGNOSIS — Z85.51 ENCOUNTER FOR FOLLOW-UP SURVEILLANCE OF BLADDER CANCER: ICD-10-CM

## 2025-06-19 DIAGNOSIS — Z95.828 PORT-A-CATH IN PLACE: Primary | ICD-10-CM

## 2025-06-19 DIAGNOSIS — Z45.2 ENCOUNTER FOR CARE RELATED TO PORT-A-CATH: ICD-10-CM

## 2025-06-19 DIAGNOSIS — E53.8 B12 DEFICIENCY: ICD-10-CM

## 2025-06-19 DIAGNOSIS — C67.8 MALIGNANT NEOPLASM OF OVERLAPPING SITES OF BLADDER (HCC): Primary | ICD-10-CM

## 2025-06-19 DIAGNOSIS — Z71.89 CARE PLAN DISCUSSED WITH PATIENT: ICD-10-CM

## 2025-06-19 DIAGNOSIS — E53.8 VITAMIN B 12 DEFICIENCY: ICD-10-CM

## 2025-06-19 LAB
ALBUMIN SERPL-MCNC: 4.2 G/DL (ref 3.5–5.2)
ALP SERPL-CCNC: 88 U/L (ref 40–129)
ALT SERPL-CCNC: 10 U/L (ref 5–41)
ANION GAP SERPL CALCULATED.3IONS-SCNC: 10 MMOL/L (ref 7–19)
AST SERPL-CCNC: 21 U/L (ref 5–40)
BASOPHILS # BLD: 0.07 K/UL (ref 0–0.2)
BASOPHILS NFR BLD: 1.4 % (ref 0–1)
BILIRUB SERPL-MCNC: 1.3 MG/DL (ref 0–1.2)
BUN SERPL-MCNC: 19 MG/DL (ref 8–23)
CALCIUM SERPL-MCNC: 8.6 MG/DL (ref 8.8–10.2)
CHLORIDE SERPL-SCNC: 107 MMOL/L (ref 98–107)
CO2 SERPL-SCNC: 23 MMOL/L (ref 22–29)
CREAT SERPL-MCNC: 1 MG/DL (ref 0.7–1.2)
EOSINOPHIL # BLD: 0.14 K/UL (ref 0–0.6)
EOSINOPHIL NFR BLD: 2.9 % (ref 0–5)
ERYTHROCYTE [DISTWIDTH] IN BLOOD BY AUTOMATED COUNT: 13.2 % (ref 11.5–14.5)
GLUCOSE SERPL-MCNC: 93 MG/DL (ref 70–99)
HCT VFR BLD AUTO: 39.9 % (ref 42–52)
HGB BLD-MCNC: 13.7 G/DL (ref 14–18)
LYMPHOCYTES # BLD: 0.88 K/UL (ref 1.1–4.5)
LYMPHOCYTES NFR BLD: 18 % (ref 20–40)
MCH RBC QN AUTO: 32.5 PG (ref 27–31)
MCHC RBC AUTO-ENTMCNC: 34.3 G/DL (ref 33–37)
MCV RBC AUTO: 94.5 FL (ref 80–94)
MONOCYTES # BLD: 0.59 K/UL (ref 0–0.9)
MONOCYTES NFR BLD: 12.1 % (ref 1–10)
NEUTROPHILS # BLD: 3.18 K/UL (ref 1.5–7.5)
NEUTS SEG NFR BLD: 65.2 % (ref 50–65)
PLATELET # BLD AUTO: 159 K/UL (ref 130–400)
PMV BLD AUTO: 10.5 FL (ref 9.4–12.4)
POTASSIUM SERPL-SCNC: 4.1 MMOL/L (ref 3.5–5.1)
PROT SERPL-MCNC: 7 G/DL (ref 6.4–8.3)
RBC # BLD AUTO: 4.22 M/UL (ref 4.7–6.1)
SODIUM SERPL-SCNC: 140 MMOL/L (ref 136–145)
VIT B12 SERPL-MCNC: 191 PG/ML (ref 232–1245)
WBC # BLD AUTO: 4.88 K/UL (ref 4.8–10.8)

## 2025-06-19 PROCEDURE — 96523 IRRIG DRUG DELIVERY DEVICE: CPT

## 2025-06-19 PROCEDURE — 80053 COMPREHEN METABOLIC PANEL: CPT

## 2025-06-19 PROCEDURE — 1126F AMNT PAIN NOTED NONE PRSNT: CPT | Performed by: INTERNAL MEDICINE

## 2025-06-19 PROCEDURE — G2211 COMPLEX E/M VISIT ADD ON: HCPCS | Performed by: INTERNAL MEDICINE

## 2025-06-19 PROCEDURE — 1159F MED LIST DOCD IN RCRD: CPT | Performed by: INTERNAL MEDICINE

## 2025-06-19 PROCEDURE — 82607 VITAMIN B-12: CPT

## 2025-06-19 PROCEDURE — 2500000003 HC RX 250 WO HCPCS

## 2025-06-19 PROCEDURE — 85025 COMPLETE CBC W/AUTO DIFF WBC: CPT

## 2025-06-19 PROCEDURE — 36415 COLL VENOUS BLD VENIPUNCTURE: CPT

## 2025-06-19 PROCEDURE — 6360000002 HC RX W HCPCS

## 2025-06-19 PROCEDURE — 1123F ACP DISCUSS/DSCN MKR DOCD: CPT | Performed by: INTERNAL MEDICINE

## 2025-06-19 PROCEDURE — 99213 OFFICE O/P EST LOW 20 MIN: CPT

## 2025-06-19 RX ORDER — SODIUM CHLORIDE 0.9 % (FLUSH) 0.9 %
5-40 SYRINGE (ML) INJECTION PRN
Status: DISCONTINUED | OUTPATIENT
Start: 2025-06-19 | End: 2025-06-20 | Stop reason: HOSPADM

## 2025-06-19 RX ORDER — HEPARIN 100 UNIT/ML
500 SYRINGE INTRAVENOUS PRN
OUTPATIENT
Start: 2025-06-19

## 2025-06-19 RX ORDER — SODIUM CHLORIDE 9 MG/ML
25 INJECTION, SOLUTION INTRAVENOUS PRN
OUTPATIENT
Start: 2025-06-19

## 2025-06-19 RX ORDER — SODIUM CHLORIDE 0.9 % (FLUSH) 0.9 %
5-40 SYRINGE (ML) INJECTION PRN
OUTPATIENT
Start: 2025-06-19

## 2025-06-19 RX ORDER — HEPARIN 100 UNIT/ML
500 SYRINGE INTRAVENOUS PRN
Status: DISCONTINUED | OUTPATIENT
Start: 2025-06-19 | End: 2025-06-20 | Stop reason: HOSPADM

## 2025-06-19 RX ADMIN — SODIUM CHLORIDE, PRESERVATIVE FREE 10 ML: 5 INJECTION INTRAVENOUS at 08:19

## 2025-06-19 RX ADMIN — HEPARIN 500 UNITS: 100 SYRINGE at 08:19

## 2025-06-23 DIAGNOSIS — E53.8 B12 DEFICIENCY: ICD-10-CM

## 2025-06-23 DIAGNOSIS — C67.8 MALIGNANT NEOPLASM OF OVERLAPPING SITES OF BLADDER (HCC): Primary | ICD-10-CM

## 2025-06-23 RX ORDER — LANOLIN ALCOHOL/MO/W.PET/CERES
1000 CREAM (GRAM) TOPICAL DAILY
Qty: 30 TABLET | Refills: 3 | Status: SHIPPED | OUTPATIENT
Start: 2025-06-23

## 2025-06-23 NOTE — TELEPHONE ENCOUNTER
I have reached out to patient and his significant other regarding 's recommendation to initiate Vitamin B12 1000mcg daily. I have verified pharmacy with patient's significant other.

## (undated) DEVICE — ELECTRODE LOOP 24FR WIRE DIA0.35MM YEL CUT ANG 1 STEM W/

## (undated) DEVICE — SOLUTION IRRIG 3000ML STRL H2O USP UROMATIC PLAS CONT

## (undated) DEVICE — NEEDLE HYPO 22GA L1.5IN BLK POLYPR HUB S STL REG BVL STR

## (undated) DEVICE — EVACUATOR URO BLDR W/ ADPT UROVAC

## (undated) DEVICE — TUBE ET 7.5MM NSL ORAL BASIC CUF INTMED MURPHY EYE RADPQ

## (undated) DEVICE — BAG DRNGE COMB PK

## (undated) DEVICE — CHLORAPREP 26ML ORANGE

## (undated) DEVICE — SUTURE PROL SZ 0 L30IN NONABSORBABLE BLU L26MM CT-2 1/2 CIR 8412H

## (undated) DEVICE — CURAVIEW LED LARYNSCP BLDE

## (undated) DEVICE — SUTURE VCRL SZ 3-0 L27IN ABSRB UD L26MM SH 1/2 CIR J416H

## (undated) DEVICE — ENDO KIT,LOURDES HOSPITAL: Brand: MEDLINE INDUSTRIES, INC.

## (undated) DEVICE — PROVE COVER: Brand: UNBRANDED

## (undated) DEVICE — CATHETER URET 5FR L70CM OPN END SGL LUMN INJ HUB FLEXIMA

## (undated) DEVICE — CANNULA NSL AD L7FT DIV O2 CO2 W/ M LUERLOCK TRMPT CONN

## (undated) DEVICE — INTENDED FOR TISSUE SEPARATION, AND OTHER PROCEDURES THAT REQUIRE A SHARP SURGICAL BLADE TO PUNCTURE OR CUT.: Brand: BARD-PARKER ® CARBON RIB-BACK BLADES

## (undated) DEVICE — SEAL ENDO INSTR SELF SEAL UROLOGY

## (undated) DEVICE — BAG BND 20X15IN CLR POLY RUBBERBAND NONSTERILE CFI664NS

## (undated) DEVICE — GLOVE SURG SZ 7 CRM LTX FREE POLYISOPRENE POLYMER BEAD ANTI

## (undated) DEVICE — GLOVE SURG SZ 75 CRM LTX FREE POLYISOPRENE POLYMER BEAD ANTI

## (undated) DEVICE — VAGINAL PREP TRAY: Brand: MEDLINE INDUSTRIES, INC.

## (undated) DEVICE — CONMED GOLDLINE ELECTROSURGICAL HANDPIECE, HAND CONTROLLED WITH BLADE ELECTRODE, BUTTON SWITCH, SAFETY HOLSTER AND 10 FT (3 M) CABLE: Brand: CONMED GOLDLINE

## (undated) DEVICE — DOVER HYDROGEL COATED LATEX FOLEY CATHETER, 5 ML, 3-WAY 22 FR/CH (7.3 MM): Brand: DOVER

## (undated) DEVICE — COVER US PRB W15XL120CM W/ GEL RUBBERBAND TAPE STRP FLD GEN

## (undated) DEVICE — SUTURE MCRYL SZ 4-0 L18IN ABSRB UD L19MM PS-2 3/8 CIR PRIM Y496G

## (undated) DEVICE — DRAINBAG,ANTI-REFLUX TOWER,L/F,2000ML,LL: Brand: MEDLINE

## (undated) DEVICE — GUIDEWIRE ENDOSCP L150CM DIA0.035IN TIP 3CM PTFE NIT

## (undated) DEVICE — C-ARM: Brand: UNBRANDED

## (undated) DEVICE — SOLUTION IRRIG 3000ML 0.9% SOD CHL USP UROMATIC PLAS CONT

## (undated) DEVICE — MAJOR BSIN SETUP PK

## (undated) DEVICE — SKIN MARKER,REGULAR TIP WITH RULER: Brand: DEVON

## (undated) DEVICE — 9165 UNIVERSAL PATIENT PLATE: Brand: 3M™

## (undated) DEVICE — AIRLIFE™ ADULT OXYGEN MASK VINYL, UNDER-THE-CHIN STYLE, HIGH CONCENTRATION NONREBREATHER MASK WITH SAFETY VENT AND 7 FEET (2.1 M) SUPPLY TUBING: Brand: AIRLIFE™

## (undated) DEVICE — PAD,EYE,1-5/8X2 5/8,STERILE,LF,1/PK: Brand: MEDLINE

## (undated) DEVICE — TRAY,IRRIGATION,PISTON SYRINGE,60ML: Brand: MEDLINE

## (undated) DEVICE — NEEDLE ANGIO 18GAX2.75IN PERC NO BASE

## (undated) DEVICE — SURGICAL PROCEDURE PACK CYTOSCOPY

## (undated) DEVICE — PACK,UNIVERSAL,NO GOWNS: Brand: MEDLINE

## (undated) DEVICE — ADHESIVE SKIN CLSR 0.7ML TOP DERMBND ADV

## (undated) DEVICE — STERILE LATEX POWDER FREE SURGICAL GLOVES WITH HYDROGEL COATING: Brand: PROTEXIS